# Patient Record
Sex: MALE | Race: BLACK OR AFRICAN AMERICAN | NOT HISPANIC OR LATINO | Employment: PART TIME | URBAN - METROPOLITAN AREA
[De-identification: names, ages, dates, MRNs, and addresses within clinical notes are randomized per-mention and may not be internally consistent; named-entity substitution may affect disease eponyms.]

---

## 2021-03-22 ENCOUNTER — HOSPITAL ENCOUNTER (OUTPATIENT)
Dept: BEHAVIORAL HEALTH | Facility: CLINIC | Age: 56
Discharge: HOME | End: 2021-03-22
Attending: FAMILY MEDICINE | Admitting: FAMILY MEDICINE
Payer: COMMERCIAL

## 2021-03-22 VITALS — HEIGHT: 72 IN | BODY MASS INDEX: 23.7 KG/M2 | WEIGHT: 175 LBS

## 2021-03-22 PROCEDURE — H0001 ALCOHOL AND/OR DRUG ASSESS: HCPCS | Mod: GT

## 2021-03-22 RX ORDER — FUROSEMIDE 80 MG
TABLET ORAL
COMMUNITY
Start: 2021-03-01 | End: 2023-03-16

## 2021-03-22 RX ORDER — ALLOPURINOL 100 MG/1
50 TABLET ORAL
COMMUNITY
Start: 2021-01-26 | End: 2023-03-16

## 2021-03-22 RX ORDER — CHOLECALCIFEROL (VITAMIN D3) 125 MCG
3000 CAPSULE ORAL
COMMUNITY
Start: 2021-01-26 | End: 2023-03-16

## 2021-03-22 RX ORDER — LANCETS
EACH MISCELLANEOUS
COMMUNITY
Start: 2021-01-26

## 2021-03-22 RX ORDER — BLOOD SUGAR DIAGNOSTIC
STRIP MISCELLANEOUS
COMMUNITY
Start: 2021-01-26

## 2021-03-22 RX ORDER — BUPROPION HYDROCHLORIDE 150 MG/1
TABLET ORAL
COMMUNITY
Start: 2020-05-02 | End: 2023-03-16

## 2021-03-22 RX ORDER — FUROSEMIDE 80 MG
160 TABLET ORAL
COMMUNITY
Start: 2021-03-01 | End: 2023-03-16

## 2021-03-22 RX ORDER — BLOOD-GLUCOSE METER
EACH MISCELLANEOUS
COMMUNITY
Start: 2021-01-26

## 2021-03-22 RX ORDER — ERGOCALCIFEROL 1.25 MG/1
50000 CAPSULE ORAL
COMMUNITY
Start: 2021-01-26 | End: 2023-03-16

## 2021-03-22 RX ORDER — BLOOD SUGAR DIAGNOSTIC
STRIP MISCELLANEOUS
COMMUNITY
Start: 2021-01-26 | End: 2023-08-08

## 2021-03-22 RX ORDER — LANCETS
EACH MISCELLANEOUS
COMMUNITY
Start: 2021-01-26 | End: 2023-08-08

## 2021-03-22 RX ORDER — GABAPENTIN 100 MG/1
100 CAPSULE ORAL
COMMUNITY
Start: 2021-01-25 | End: 2021-04-01

## 2021-03-22 RX ORDER — ACETAMINOPHEN 325 MG/1
650 TABLET ORAL
COMMUNITY
Start: 2021-01-14 | End: 2023-03-16

## 2021-03-22 RX ORDER — BLOOD-GLUCOSE METER
EACH MISCELLANEOUS
COMMUNITY
Start: 2021-01-26 | End: 2023-08-08

## 2021-03-22 RX ORDER — ALLOPURINOL 100 MG/1
TABLET ORAL
COMMUNITY
Start: 2021-01-25 | End: 2023-03-16

## 2021-03-22 RX ORDER — IBUPROFEN 200 MG
500 CAPSULE ORAL
COMMUNITY
Start: 2021-01-26 | End: 2023-03-16

## 2021-03-22 RX ORDER — GABAPENTIN 100 MG/1
100 CAPSULE ORAL 3 TIMES DAILY
Status: ON HOLD | COMMUNITY
Start: 2021-02-23 | End: 2023-03-18

## 2021-03-22 ASSESSMENT — ANXIETY QUESTIONNAIRES
GAD7 TOTAL SCORE: 10
3. WORRYING TOO MUCH ABOUT DIFFERENT THINGS: MORE THAN HALF THE DAYS
4. TROUBLE RELAXING: MORE THAN HALF THE DAYS
5. BEING SO RESTLESS THAT IT IS HARD TO SIT STILL: SEVERAL DAYS
IF YOU CHECKED OFF ANY PROBLEMS ON THIS QUESTIONNAIRE, HOW DIFFICULT HAVE THESE PROBLEMS MADE IT FOR YOU TO DO YOUR WORK, TAKE CARE OF THINGS AT HOME, OR GET ALONG WITH OTHER PEOPLE: SOMEWHAT DIFFICULT
6. BECOMING EASILY ANNOYED OR IRRITABLE: SEVERAL DAYS
1. FEELING NERVOUS, ANXIOUS, OR ON EDGE: MORE THAN HALF THE DAYS
7. FEELING AFRAID AS IF SOMETHING AWFUL MIGHT HAPPEN: SEVERAL DAYS
2. NOT BEING ABLE TO STOP OR CONTROL WORRYING: SEVERAL DAYS

## 2021-03-22 ASSESSMENT — COLUMBIA-SUICIDE SEVERITY RATING SCALE - C-SSRS
2. HAVE YOU ACTUALLY HAD ANY THOUGHTS OF KILLING YOURSELF?: NO
TOTAL  NUMBER OF ABORTED OR SELF INTERRUPTED ATTEMPTS PAST 3 MONTHS: NO
5. HAVE YOU STARTED TO WORK OUT OR WORKED OUT THE DETAILS OF HOW TO KILL YOURSELF? DO YOU INTEND TO CARRY OUT THIS PLAN?: NO
ATTEMPT LIFETIME: NO
1. IN THE PAST MONTH, HAVE YOU WISHED YOU WERE DEAD OR WISHED YOU COULD GO TO SLEEP AND NOT WAKE UP?: NO
ATTEMPT PAST THREE MONTHS: NO
TOTAL  NUMBER OF ABORTED OR SELF INTERRUPTED ATTEMPTS PAST LIFETIME: NO
1. IN THE PAST MONTH, HAVE YOU WISHED YOU WERE DEAD OR WISHED YOU COULD GO TO SLEEP AND NOT WAKE UP?: NO
4. HAVE YOU HAD THESE THOUGHTS AND HAD SOME INTENTION OF ACTING ON THEM?: NO
5. HAVE YOU STARTED TO WORK OUT OR WORKED OUT THE DETAILS OF HOW TO KILL YOURSELF? DO YOU INTEND TO CARRY OUT THIS PLAN?: NO
6. HAVE YOU EVER DONE ANYTHING, STARTED TO DO ANYTHING, OR PREPARED TO DO ANYTHING TO END YOUR LIFE?: NO
6. HAVE YOU EVER DONE ANYTHING, STARTED TO DO ANYTHING, OR PREPARED TO DO ANYTHING TO END YOUR LIFE?: NO
TOTAL  NUMBER OF INTERRUPTED ATTEMPTS LIFETIME: NO
3. HAVE YOU BEEN THINKING ABOUT HOW YOU MIGHT KILL YOURSELF?: NO
TOTAL  NUMBER OF INTERRUPTED ATTEMPTS PAST 3 MONTHS: NO
2. HAVE YOU ACTUALLY HAD ANY THOUGHTS OF KILLING YOURSELF LIFETIME?: NO
4. HAVE YOU HAD THESE THOUGHTS AND HAD SOME INTENTION OF ACTING ON THEM?: NO

## 2021-03-22 ASSESSMENT — PATIENT HEALTH QUESTIONNAIRE - PHQ9: SUM OF ALL RESPONSES TO PHQ QUESTIONS 1-9: 9

## 2021-03-22 ASSESSMENT — PAIN SCALES - GENERAL: PAINLEVEL: NO PAIN (0)

## 2021-03-22 ASSESSMENT — MIFFLIN-ST. JEOR: SCORE: 1671.79

## 2021-03-22 NOTE — PROGRESS NOTES
"[facility]  Provider Name:  Rashel Tamayo     Credentials:  [facility]    PATIENT'S NAME: Monty Cox  PREFERRED NAME: \"Monty\"  PRONOUNS:  He/Him    MRN: [id number]  : 1965  ADDRESS: [address] [address]   ACCT. NUMBER:  [id number]  DATE OF SERVICE: 3/22/21  START TIME: 1:00 PM  END TIME: 2:30 PM  PREFERRED PHONE: [phone]  May we leave a program related message: Yes  SERVICE MODALITY:  Video Visit:      Provider verified identity through the following two step process.  Patient provided:  Patient  and Patient's last 4 digits of SSN    Telemedicine Visit: The patient's condition can be safely assessed and treated via synchronous audio and visual telemedicine encounter.      Reason for Telemedicine Visit: Patient has requested telehealth visit    Originating Site (Patient Location): Patient's home    Distant Site (Provider Location): Provider Remote Setting    Consent:  The patient/guardian has verbally consented to: the potential risks and benefits of telemedicine (video visit) versus in person care; bill my insurance or make self-payment for services provided; and responsibility for payment of non-covered services.     Patient would like the video invitation sent by:  Send to e-mail at: [email or web address]    Mode of Communication:  Video Conference via well    As the provider I attest to compliance with applicable laws and regulations related to telemedicine.    UNIVERSAL ADULT Substance Use Disorder DIAGNOSTIC ASSESSMENT      Identifying Information:  Patient is a 55 year old, .  The pronoun use throughout this assessment reflects the patient's chosen pronoun.  Patient was referred for an assessment by self.  Patient attended the session alone.     Chief Complaint:   The reason for seeking services at this time is: \"I have been sober for a while and had relapsed and needed professional help to maintain " "sobriety\"   The problem(s) began about 15-20 years. Patient has attempted to resolve these concerns in the past through 1 IP and 1 OP ([facility] for IP and [facility] for OP).  Patient does not appear to be in severe withdrawal, an imminent safety risk to self or others, or requiring immediate medical attention and may proceed with the assessment interview.    Social/Family History:  Patient reported they grew up in [place].  They were raised by my mother primarily and father co-parenting.  Patient reported that their childhood was good.  Patient describes current relationships with family of origin as \"very good\".      The patient describes their cultural background as -Black.  Cultural influences and impact on patient's life structure, values, norms, and healthcare: none identified.  Contextual influences on patient's health include: Family Factors oriented person.  Patient identified their preferred language to be English. Patient reported they does not need the assistance of an  or other support involved in therapy.  Patient reports they are involved in community of jg activities.  They reports spirituality impacts recovery in the following ways: \"medium to high impact in a positive way\".     Patient reported had no significant delays in developmental tasks.   Patient's highest education level was some college. Patient identified the following learning problems: none reported.  Patient reports they are  able to understand written materials.    Patient reported the following relationship history \"Yes, we were together for 8 years before I  my wife\".  Patient's current relationship status is  for 10 years.   Patient identified their sexual orientation as heterosexual.  Patient reported having zero child(isidro).     Patient's current living/housing situation involves staying in own home/apartment.  They live with my wife and two firends and they report that housing is stable. " "Patient identified mother, father, friends and spouse as part of their support system.  Patient identified the quality of these relationships as good.      Patient reports engaging in the following recreational/leisure activities: \"painting arts; gardening\".  Patient is currently unemployed.  Patient reports their income is obtained through spouse and unemployment.  Patient does not identify finances as a current stressor.      Patient denies substance related arrests or legal issues.  Patient denies being on probation / parole / under the jurisdiction of the court.      Patient's Strengths and Limitations:  Patient identified the following strengths or resources that will help them succeed in treatment: Judaism / Sabianist, commitment to health and well being, jg / spirituality, friends / good social support, family support, motivation, sober support group / recovery support  and sponsor. Things that may interfere with the patient's success in treatment include: few friends and lack of social support.     Personal and Family Medical History:  Patient did not report a family history of mental health concerns.  Patient reports family history is not on file..      Patient reported the following previous mental health diagnoses: \"depression and anxiety\".  Patient reports their primary mental health symptoms include:\"tired, being reclusive\" and these do not impact his ability to function.   Patient has received mental health services in the past: \"Not very long about 3 months ago\".  Psychiatric Hospitalizations: None.  Patient denies a history of civil commitment.  Current mental health services/providers include:  None reported.    GAIN-SS Tool:    When was the last time that you had significant problems...  a. with feeling very trapped, lonely, sad, blue, depressed or hopeless about the future? Past Month  b. with sleep trouble, such as bad dreams, sleeping restlessly, or falling asleep during the day? Past " Month  c. with feeling very anxious, nervous, tense, scared, panicked or like something bad was going to happen?  Past Month  d. with becoming very distressed and upset when something reminded you of the past?  2 - 12 months ago  e. with thinking about ending your life or committing suicide?  Never  When was the last time that you did the following things two or more times?  a. Lied or conned to get things you wanted or to avoid having to do something?   Past Month  b. Had a hard time paying attention at school, work or home? Past Month  c. Had a hard time listening to instructions at school, work or home?  Never  d. Were a bully or threatened other people?  Never  e. Started physical fights with other people?  Never    Patient has had a physical exam to rule out medical causes for current symptoms.  Date of last physical exam was within the past year. Client was encouraged to follow up with PCP if symptoms were to develop. The patient has a non-[place] Primary Care Provider. Their PCP is Dr. Gustavo Chong at [facility]..  Patient reports the following current medical concerns: kidneys failure-is on dialysis and no current dental concerns.  Patient denies any issues with pain.. Patient denies pregnancy..  There are not significant appetite / nutritional concerns / weight changes.  Patient does not report a history of an eating disorder.  Patient does not report a history of head injury / trauma / cognitive impairment.  NA    Patient reports current meds as:   Outpatient Medications Marked as Taking for the 3/22/21 encounter (Hospital Encounter) with Rashel James LADC   Medication Sig     ACCU-CHEK GUIDE test strip      acetaminophen (TYLENOL) 325 MG tablet Take 650 mg by mouth     allopurinol (ZYLOPRIM) 100 MG tablet Take 50 mg by mouth     allopurinol (ZYLOPRIM) 100 MG tablet      blood glucose (ACCU-CHEK GUIDE) test strip Test 2 times per day. Pharmacy allowed to substitute per patient's insurance.     blood  glucose monitoring (ACCU-CHEK ERIKA PLUS) meter device kit Use as directed     blood glucose monitoring (SOFTCLIX) lancets Test 2 times per day. Call your doctor if blood glucose is > 300.     blood glucose monitoring (SOFTCLIX) lancets      Blood Glucose Monitoring Suppl (ACCU-CHEK GUIDE) w/Device KIT      buPROPion (WELLBUTRIN XL) 150 MG 24 hr tablet      calcium carbonate 500 mg, elemental, (OSCAL 500) 1250 (500 Ca) MG TABS tablet Take 500 mg by mouth     darbepoetin veronika (ARANESP) 40 MCG/ML injection Inject 40 mcg Subcutaneous     ergocalciferol (ERGOCALCIFEROL) 1.25 MG (68216 UT) capsule Take 50,000 Units by mouth     furosemide (LASIX) 80 MG tablet Take 160 mg by mouth     furosemide (LASIX) 80 MG tablet      gabapentin (NEURONTIN) 100 MG capsule Take 100 mg by mouth     gabapentin (NEURONTIN) 100 MG capsule      lactase (LACTAID) 3000 UNIT tablet Take 3,000 Units by mouth     multivitamin RENAL (NEPHROCAPS/TRIPHROCAPS) 1 MG capsule Take 1 mg by mouth       Medication Adherence:  Patient reports taking prescribed medications as prescribed.    Patient Allergies:  Not on File    Medical History:  No past medical history on file.    Rating Scales:    PHQ9:  No flowsheet data found.;      GAD7:  No flowsheet data found.    Substance Use:  Patient reported the following biological family members or relatives with chemical health issues: Father reportedly used alcohol , Nephew reportedly used alcohol .  Patient has received substance use disorder and/or gambling treatment in the past.  Patient reports the following dates and locations of treatment services:  1 IP and 1 OP ([facility] for IP and [facility] for OP).  Patient has ever been to detox 3 times.  Patient is not currently receiving any chemical dependency treatment. Patient reports they currently attend the following support groups: AA every Tuesday.             X = Primary Drug Used   Age of First Use Most Recent Pattern of Use and Duration   Need enough  "information to show pattern (both frequency and amounts) and to show tolerance for each chemical that has a diagnosis   Date of last use and time, if needed   Withdrawal Potential? Requiring special care Method of use  (oral, smoked, snort, IV, etc)      Alcohol     20 Drinking 4-5 drinks with friends possibly on weekends and maybe once or twice during the week.  I had 6-7 years of sobriety.  My drinking has gone out of hand for the last 15-20 years( drinking 4-8 drinks about 4 nights a week.  Past 12 months: Prior to couple months ago. It was 1-2 pints daily, and then reduced to few drinks for the past couple weeks.   A week and half no oral      Marijuana/  Hashish   No use          Cocaine/Crack     No use          Meth/  Amphetamines   No use          Heroin     No use          Other Opiates/  Synthetics   No use          Inhalants     No use          Benzodiazepines     No use          Hallucinogens     No use          Barbiturates/  Sedatives/  Hypnotics No use          Over-the-Counter Drugs   No use          Other     No use          Nicotine     No use           Patient reported the following problems as a result of their substance use: relationship problems.  Patient is concerned about substance use. Patient reports alcohol is concerned about their substance use.  Patient reports their recovery goals are \"stopping drinking altogether and not relying on it'.     Patient reports experiencing the following withdrawal symptoms within the past 12 months: sweating, shaky/jittery/tremors, unable to sleep, agitation, headache, fatigue, sad/depressed feeling, muscle aches, vivid/unpleasant dreams, irritability, nausea/vomiting, dizziness, seizures, diarrhea, diminished appetite, hallucinations, unable to eat, fever and anxiety/worry and the following within the past 30 days: none.   Patients reports urges to use Alcohol.  Patient reports he has used more Alcohol than intended and over a longer period of time than " "intended. Patient reports he has had unsuccessful attempts to cut down or control use of Alcohol.  Patient reports longest period of abstinence was 6-7 years and return to use was due to \"stress and anxiety; family health related issues\". Patient reports he has needed to use more Alcohol to achieve the same effect.  Patient does  report diminished effect with use of same amount of Alcohol.     Patient does  report a great deal of time is spent in activities necessary to obtain, use, or recover from Alcohol effects.  Patient does not report important social, occupational, or recreational activities are given up or reduced because of Alcohol use.  Alcohol use is continued despite knowledge of having a persistent or recurrent physical or psychological problem that is likely to have caused or exacerbated by use.  Patient reports the following problem behaviors while under the influence of substances \"more isolated, talk more than usual\".     Patient reports substance use has not ever impacted their ability to function in a school setting. Patient reports substance use has not ever impacted their ability to function in a work setting.  Patients demographics and history impact their recovery in the following ways:  None reported.  Patient reports engaging in the following recreation/leisure activities while using:  None at this time.  Patient reports the following people are supportive of recovery: Family and friends.    Patient does not have a history of gambling concerns and/or treatment.  Patient does not have other addictive behaviors he is concerned about.        Dimension Scale Ratings:    Dimension 1 -  Acute Intoxication/Withdrawal: 1 - Minor Problem  Dimension 2 - Biomedical: 2 - Moderate Problem  Dimension 3 - Emotional/Behavioral/Cognitive Conditions: 2 - Moderate Problem  Dimension 4 - Readiness to Change:  2 - Moderate Problem  Dimension 5 - Relapse/Continued Use/ Continued Problem Potential: 3 - Severe " Problem  Dimension 6 - Recovery Environment:  3 - Severe Problem      Significant Losses / Trauma / Abuse / Neglect Issues:   Patient did not serve in the .  There are indications or report of significant loss, trauma, abuse or neglect issues related to: are no indications and client denies any losses, trauma, abuse, or neglect concerns.  Concerns for possible neglect are not present. NA    Safety Assessment:   Current Safety Concerns:  [place] Suicide Severity Rating Scale (Lifetime/Recent)  [place] Suicide Severity Rating (Lifetime/Recent) 3/22/2021   1. Wish to be Dead (Lifetime) No   1. Wish to be Dead (Recent) No   2. Non-Specific Active Suicidal Thoughts (Lifetime) No   2. Non-Specific Active Suicidal Thoughts (Recent) No   3. Active Suicidal Ideation with any Methods (Not Plan) Without Intent to Act (Lifetime) No   3. Active Suicidal Ideation with any Methods (Not Plan) Without Intent to Act (Recent) No   4. Active Suicidal Ideation with Some Intent to Act, Without Specific Plan (Lifetime) No   4. Active Suicidal Ideation with Some Intent to Act, Without Specific Plan (Recent) No   5. Active Suicidal Ideation with Specific Plan and Intent (Lifetime) No   5. Active Suicidal Ideation with Specific Plan and Intent (Recent) No   Most Severe Ideation Rating (Lifetime) NA   Most Severe Ideation Description (Lifetime) NA   Frequency (Lifetime) NA   Duration (Lifetime) NA   Controllability (Lifetime) NA   Protective Factors  (Lifetime) NA   Reasons for Ideation (Lifetime) NA   Most Severe Ideation Rating (Past Month) NA   Most Severe Ideation Description (Past Month) NA   Frequency (Past Month) NA   Duration (Past Month) NA   Controllability (Past Month) NA   Protective Factors (Past Month) NA   Reasons for Ideation (Past Month) NA   Actual Attempt (Lifetime) No   Actual Attempt (Past 3 Months) No   Has subject engaged in non-suicidal self-injurious behavior? (Lifetime) No   Has subject engaged in  non-suicidal self-injurious behavior? (Past 3 Months) No   Interrupted Attempts (Lifetime) No   Interrupted Attempts (Past 3 Months) No   Aborted or Self-Interrupted Attempt (Lifetime) No   Aborted or Self-Interrupted Attempt (Past 3 Months) No   Preparatory Acts or Behavior (Lifetime) No   Preparatory Acts or Behavior (Past 3 Months) No   Most Recent Attempt Actual Lethality Code NA   Most Lethal Attempt Actual Lethality Code NA   Initial/First Attempt Actual Lethality Code NA     Patient denies current homicidal ideation and behaviors.  Patient denies current self-injurious ideation and behaviors.    Patient denied risk behaviors associated with substance use.  Patient denies any high risk behaviors associated with mental health symptoms.  Patient reports the following current concerns for their personal safety: None.  Patient reports there are not  firearms in the house. NA.     History of Safety Concerns:  Patient denied a history of homicidal ideation.     Patient denied a history of personal safety concerns.    Patient denied a history of assaultive behaviors.    Patient denied a history of sexual assault behaviors.     Patient denied a history of risk behaviors associated with substance use.  Patient denies any history of high risk behaviors associated with mental health symptoms.  Patient reports the following protective factors: spirituality, restricted access to lethal means weapon, safe and stable environment, help seeking behaviors when distressed for his alcohol use problem, adherence with prescribed medication, agreement to use safety plan, living with other people, uses community crisis resources, healthy fear of risky behaviors or pain and access to a variety of clinical interventions    Risk Plan:  See Recommendations for Safety and Risk Management Plan    Review of Symptoms per patient report:  Substance Use:  vomiting, hangovers, daily use, family relationship problems due to substance use and  cravings/urges to use     Diagnostic Criteria:  OP BEH ALEXA CRITERIA: Substance is often taken in larger amounts or over a longer period than was intended.  Met for:  Alcohol, There is persistent desire or unsuccessful efforts to cut down or control use of the substance.  Met for:  Alcohol,  A great deal of time is spent in activities necessary to obtain the substance, use the substance, or recover from its effects.  Met for:  Alcohol, Craving, or a strong desire or urge to use the substance.  Met for:  Alcohol, Continued use of the substance despite having persistent or recurrent social or interpersonal problems caused or exacerbated by the effects of its use.  Met for:  Alcohol, Important social, occupational, or recreational activities are given up or reduced because of the substance.  Met for:  Alcohol, Use of the substance is continued despite knowledge of having a persistent or recurrent physical or psychological problem that is likely to have been cause or exacerbated by the substance.  Met for:  Alcohol, Tolerance:  either a need for markedly increased amounts of the substance to achieve the desired effect or a markedly diminished effect with continued use of the dame amount of the substance.  Met for:  Alcohol, Withdrawal:  either patient endorses characteristic withdrawal syndrome for the substance or the substance (or closely related substance) is taken to relieve or avoid withdrawal symptoms.  Met for:  Alcohol          Collateral Contact Summary:   Collateral contacts contributing to this assessment:  NA    If court related records were reviewed, summarize here: NA    Information from collateral contacts supported/largely agreed with information from the client and associated risk ratings.    Information in this assessment was obtained from the medical record and provided by patient who is a fair historian.    Patient will have open access to their mental health medical record.    Diagnostic Criteria:  1.) Alcohol/drug is often taken in larger amounts or over a longer period than was intended.  Met for Alcohol.  2.) There is a persistent desire or unsuccessful efforts to cut down or control alcohol/drug use.  Met for Alcohol.  3.) A great deal of time is spent in activities necessary to obtain alcohol, use alcohol, or recover from its effects.  Met for Alcohol.  4.) Craving, or a strong desire or urge to use alcohol/drug.  Met for Alcohol.  6.) Continued alcohol use despite having persistent or recurrent social or interpersonal problems caused or exacerbated by the effects of alcohol/drug.  Met for Alcohol.  7.) Important social, occupational, or recreational activities are given up or reduced because of alcohol/drug use.  Met for Alcohol.  9.) Alcohol/drug use is continued despite knowledge of having a persistent or recurrent physical or psychological problem that is likely to have been caused or exacerbated by alcohol.  Met for Alcohol.  10.) Tolerance, as defined by either of the following: A need for markedly increased amounts of alcohol/drug to achieve intoxication or desired effect..  Met for Alcohol.  11.) Withdrawal, as manifested by either of the following: The characteristic withdrawal syndrome for alcohol/drug (refer to Criteria A and B of the criteria set for alcohol/drug withdrawal).. Met for Alcohol.       As evidenced by self report and criteria, client meets the following DSM5 Diagnoses:   (Sustained by DSM5 Criteria Listed Above)  Alcohol Use Disorder   303.90 (F10.20) Severe Sustained.    Recommendations:     1. Plan for Safety and Risk Management:  Recommended that patient call 911 or go to the local ED should there be a change in any of these risk factors..      Report to child / adult protection services was NA.     2. ALEXA Referrals: Recommendations:  Complete an Int[facility]  .  Patient reports they are willing to follow these  recommendations.  Patient would like the following family or other support people involved in their treatment:  None at this time. Patient does not have a history of opiate use.    3. [facility]: Record has been saved! Assessment ID: [id number]     4. Recommendations for treatment focus:   Alcohol / Substance Use - Alcohol.                      Provider Name/ Credentials:  Rashel James MA, [facility]  Substance Use Assessment  Phone: [phone]  Fax: [phone]    March 22, 2021

## 2021-03-23 ASSESSMENT — ANXIETY QUESTIONNAIRES: GAD7 TOTAL SCORE: 10

## 2021-03-31 ENCOUNTER — HOSPITAL ENCOUNTER (OUTPATIENT)
Dept: BEHAVIORAL HEALTH | Facility: CLINIC | Age: 56
End: 2021-03-31
Attending: FAMILY MEDICINE
Payer: COMMERCIAL

## 2021-03-31 PROCEDURE — H2035 A/D TX PROGRAM, PER HOUR: HCPCS | Mod: GT

## 2021-03-31 NOTE — PROGRESS NOTES
Client:  Monty Cox  MRN: [id number]      Comprehensive Assessment UPDATE/IS/RASHAAD/Jennifer Re-Assess   Comprehensive Assessment Update: 3/31/2021    Comprehensive assessment dated 3/22/21 was reviewed and updates are as follows: None  Reason for admission today:  [facility]    Dates of last use and substance(s) used:   Patient does not have a history of opiate use.    Safety concerns:         Other:  None    Health Screening:  Given patient's past history, a medication, and physical condition, is there a fall risk?  No  Does the patient have any pain? No  Is the patient on a special diet? If yes, please explain: yes dialysis  Does the patient have any concerns regarding your nutritional status? If yes, please explain: no  Has the patient had any appetite changes in the last 3 months?  Yes, more/greator  Has the patient had any weight loss or weight gain in the last 3 months? Yes, how much? Loss 15 lbs  Has the patient have a history of an eating disorder or been over-eating, avoiding meals, or inducing vomiting?  No  Does the patient have any dental concerns? (Problems with teeth, pain, cavities, braces)?  NO  Are immunizations up to date?  Yes  Any recent exposure to TB, Hepatitis, Measles, or Strep?  No  Client's BMI is 23.9  Client informed of BMI?  yes  Normal, No Intervention    Dimension Scale Ratings:    Dimension 1: 1 Client can tolerate and cope with withdrawal discomfort. The client displays mild to moderate intoxication or signs and symptoms interfering with daily functioning but does not immediately endanger self or others. Client poses minimal risk of severe withdrawal.    Dimension 2: 2 Client has difficulty tolerating and coping with physical problems or has other biomedical problems that interfere with recovery and treatment. Client neglects or does not seek care for serious biomedical problems.    Dimension 3: 2 Client has difficulty with impulse control and lacks coping skills. Client has  thoughts of suicide or harm to others without means; however, the thoughts may interfere with participation in some treatment activities. Client has difficulty functioning in significant life areas. Client has moderate symptoms of emotional, behavioral, or cognitive problems. Client is able to participate in most treatment activities.    Dimension 4: 2 Client displays verbal compliance, but lacks consistent behaviors; has low motivation for change; and is passively involved in treatment.    Dimension 5: 3 Client has poor recognition and understanding of relapse and recidivism issues and displays moderately high vulnerability for further substance use or mental health problems. Client has few coping skills and rarely applies coping skills.    Dimension 6: 3 Client is not engaged in structured, meaningful activity and the client's peers, family, significant other, and living environment are unsupportive, or there is significant criminal justice system involvement.    Initial Service Plan (ISP)    Immediate health, safety, and preliminary service needs identified and plan includes the following based on available information from clients, referral sources, and collateral information.    Safety (SI, SIB, suicide attempts, aggressive behaviors):  No  Recommended that patient call 911 or go to the local ED should there be a change in any of these risk factors.     Health:  Client does NOT have health issues that would impede participation in treatment    Transportation: Client will be transported to treatment by self/video  .     Other:  NA    Patient does not have any identified barriers to participating in referred services.    Vulnerable Adult Assessment    Does the patient possess a physical or mental infirmity or other physical, mental, or emotional dysfunction?  No. Patient is not a vulnerable adult.    This patient is not a functional Vulnerable Adult according to [place] Statute 626.5572 subdivision 21.       Treatment suggestions for client for the time period until the    initial treatment planning session:  Participate and acclimate to group and peers.    [place] Re-Assessment:     Have you ever wished you were dead or that you could go to sleep and not wake up? Lifetime?  No   Past Month?  No     Have you actually had any thoughts of killing yourself?  Lifetime?  No   Past Month?  No     Have you been thinking about how you might do this? Lifetime?  N/A   Past Month?  N/A     Have you had these thoughts and had some intention of acting on them?  Lifetime?  N/A   Past Month?  N/A     Have you started to work out the details of how to kill yourself?  Lifetime?  N/A   Past Month?  N/A     Do you intend to carry out this plan?   N/A     When you have the thoughts how long do they last?   N/A    Are there things - anyone or anything (ie Family, Confucianist, pain of death) that stopped you from wanting to die or acting on thoughts of suicide?   Does not apply        2008  [employer]  Used with permission by Anay Ng, PhD.      Hermelindo Mann [facility]       3/31/2021     12:00 PM

## 2021-04-01 ENCOUNTER — HOSPITAL ENCOUNTER (OUTPATIENT)
Dept: BEHAVIORAL HEALTH | Facility: CLINIC | Age: 56
End: 2021-04-01
Attending: FAMILY MEDICINE
Payer: COMMERCIAL

## 2021-04-01 PROCEDURE — H2035 A/D TX PROGRAM, PER HOUR: HCPCS | Mod: HQ,95

## 2021-04-01 NOTE — GROUP NOTE
Group Therapy Documentation    PATIENT'S NAME: Monty Cox  MRN:   [id number]  :   1965  ACCT. NUMBER: [id number]  DATE OF SERVICE: 21  START TIME:  5:30 PM  END TIME:  7:30 PM  FACILITATOR(S): Hermelindo Mann LADC  TOPIC: BEH Group Therapy  Number of patients attending the group:  10  Group Length:  2 Hours    Group Therapy Type: Addiction    Summary of Group / Topics Discussed:    Of Addiction [place] Neurobiology     Telemedicine Visit: The patient's condition can be safely assessed and treated via synchronous audio and visual telemedicine encounter.      Reason for Telemedicine Visit: Services only offered telehealth    Originating Site (Patient Location): Patient's home    Distant Site (Provider Location): Provider Remote Setting    Consent:  The patient/guardian has verbally consented to: the potential risks and benefits of telemedicine (video visit) versus in person care; bill my insurance or make self-payment for services provided; and responsibility for payment of non-covered services.     Mode of Communication:  Video Conference via [employer]    As the provider I attest to compliance with applicable laws and regulations related to telemedicine.       Group Attendance:  Attended group session    Patient's response to the group topic/interactions:  cooperative with task    Patient appeared to be Engaged.        Client specific details:  Clt said his self talk was keeping him busy at home this week. he shared his patience and tolerance this week was at 9.5 /10. He has plans to exercise, work on his diet, practice meditation and mindfulness skills this weekend.Client finished Pleasure Un[employer] video and Identified with addiction being a disease of  the ability to choose .Client identified the roles of  2 neurotransmitters Glutamate and Dopamine in addiction. Client finished group with a discussion of Forgiveness and Grief and the roles they can play in addiction. Client identified as  recovery actions they will take over the weekend to support their recovery.

## 2021-04-01 NOTE — PROGRESS NOTES
Service Initiation Session  Service Initiation Individua session.  Video visit     Duration 60 minutes     Data: Client and I discussed SI and Orientation materials.  Client also expressed that he has been trying to control his drinking without success and recently had an extended hospital stay for detox and related health problems to his drinking..     Intervention: motivational interviewing, person centered approach      Assessment: Client seems to feel hopeful for recovery      Plan: Client will do tx planning session week of 4/5     Client will start group today.  He is going to write down 3 goals he has for self growth and learning about addiction.     Telemedicine Visit: The patient's condition can be safely assessed and treated via synchronous audio and visual telemedicine encounter.       Reason for Telemedicine Visit: Services only offered telehealth     Originating Site (Patient Location): Patient's home     Distant Site (Provider Location): Provider Remote Setting     Consent:  The patient/guardian has verbally consented to: the potential risks and benefits of telemedicine (video visit) versus in person care; bill my insurance or make self-payment for services provided; and responsibility for payment of non-covered services.      Mode of Communication:  Video Conference via [employer]     As the provider I attest to compliance with applicable laws and regulations related to telemedicine.

## 2021-04-01 NOTE — ADDENDUM NOTE
Encounter addended by: Hermelindo Mann LADC on: 4/1/2021 1:07 PM   Actions taken: Clinical Note Signed

## 2021-04-04 NOTE — ADDENDUM NOTE
Encounter addended by: Kayce Dahl [facility] on: 4/4/2021 2:09 AM   Actions taken: Episode edited

## 2021-04-05 ENCOUNTER — HOSPITAL ENCOUNTER (OUTPATIENT)
Dept: BEHAVIORAL HEALTH | Facility: CLINIC | Age: 56
End: 2021-04-05
Attending: FAMILY MEDICINE
Payer: COMMERCIAL

## 2021-04-05 PROCEDURE — H2035 A/D TX PROGRAM, PER HOUR: HCPCS | Mod: HQ,GT

## 2021-04-05 NOTE — GROUP NOTE
Group Therapy Documentation    PATIENT'S NAME: Monty Cox  MRN:   [id number]  :   1965  ACCT. NUMBER: [id number]  DATE OF SERVICE: 21  START TIME:  5:30 PM  END TIME:  7:30 PM  FACILITATOR(S): Hermelindo Mann LADC  TOPIC: BEH Group Therapy  Number of patients attending the group:  10  Group Length:  2 Hours    Group Therapy Type: Addiction    Summary of Group / Topics Discussed:    Core Values Exploration    Telemedicine Visit: The patient's condition can be safely assessed and treated via synchronous audio and visual telemedicine encounter.      Reason for Telemedicine Visit: Services only offered telehealth    Originating Site (Patient Location): Patient's home    Distant Site (Provider Location): Provider Remote Setting    Consent:  The patient/guardian has verbally consented to: the potential risks and benefits of telemedicine (video visit) versus in person care; bill my insurance or make self-payment for services provided; and responsibility for payment of non-covered services.     Mode of Communication:  Video Conference via [employer]    As the provider I attest to compliance with applicable laws and regulations related to telemedicine.        Group Attendance:  Attended group session    Patient's response to the group topic/interactions:  cooperative with task    Patient appeared to be Attentive.        Client specific details: Clt reported anxiety levels of 8/10 and depresion ov 5/10. He shared he organized a b-day party for his wife and shared that he asked a friend for help and they talked about how hard it can be to ask for help. Client participated in group discussion and exploration of personal values and how this relates to developing a knowledge of their individual strengths. Client engaged in group discussions of Strength exploration and identification. Client used knowledge of core values to assist them in a group discussion regarding establishing individual healthy  boundaries..   10

## 2021-04-06 ENCOUNTER — HOSPITAL ENCOUNTER (OUTPATIENT)
Dept: BEHAVIORAL HEALTH | Facility: CLINIC | Age: 56
End: 2021-04-06
Attending: FAMILY MEDICINE
Payer: COMMERCIAL

## 2021-04-06 PROCEDURE — H2035 A/D TX PROGRAM, PER HOUR: HCPCS | Mod: HQ,95 | Performed by: SOCIAL WORKER

## 2021-04-07 ENCOUNTER — HOSPITAL ENCOUNTER (OUTPATIENT)
Dept: BEHAVIORAL HEALTH | Facility: CLINIC | Age: 56
End: 2021-04-07
Attending: FAMILY MEDICINE
Payer: COMMERCIAL

## 2021-04-07 ENCOUNTER — BEH TREATMENT PLAN (OUTPATIENT)
Dept: BEHAVIORAL HEALTH | Facility: CLINIC | Age: 56
End: 2021-04-07

## 2021-04-07 PROCEDURE — H2035 A/D TX PROGRAM, PER HOUR: HCPCS | Mod: HQ,GT

## 2021-04-07 NOTE — TREATMENT PLAN
[facility]  Adult Chemical Dependency Program  Treatment Plan Requirements    These services are provided by the facility for each patient/client according to the individual's treatment plan:    Individual and group counseling    Education    Transition services    Services to address any co-occurring mental illness    Service coordination    Initial Treatment Plan Goals:  1. Complete all the requirements of Program Orientation.  2. Maintain medication compliance throughout the program.  3. Complete requirements for workshop/skills groups based on identified issues on your problem list.  4. Complete the support group attendance feedback sheet weekly.  5. Gain family involvement in treatment process to address family issues from the problem list.  6. Attend and participate in all required groups per individual treatment plan.  7. Focus attention to individualized issues from the treatment plan.  8. Complete all requirements for UA's, alcohol screening tests and other testing.  9. Schedule a physical examination if recommended.    In addition to the above, complete all individual goals as specifically outlines on your treatment plan.    Criteria for discharge:  Patients/clients are discharged from the program following completion of the entire program including Phase I and II or acceptance of other post-treatment referrals such as [facility], or aftercare at other facilities.  Patients/clients may also be discharged for inappropriate behavior or chemical use.      Favorable Discharge - Patients/clients have completed agreed upon treatment goals, understand their diagnosis and appear motivated about the follow-up care.    Guarded Discharge - Patients/clients have demonstrated some understanding of their diagnosis and recovery process, and have completed some of their treatment goals.  This prognosis also includes patients/clients who have completed some treatment goals but have not made  commitment to community support or follow through with referrals.    Unfavorable Discharge - Patients/clients have not completed agreed upon treatment goals due to their own choice, have limited understanding of their diagnosis, and have shown minimal or inconsistent behavior conducive to recovery.  Those patients/clients discharged due to behavioral problems will also be unfavorable discharges.                                  Adult CD Treatment Plan     Monty Cox [id number]   4/7/2021 55 year old male        ------------------------------------------------------------------------------------------------------------------  Acute Intoxication/Withdrawal Potential     DIMENSION 1  RISK FACTOR: 0      SUBSTANCE USE DISORDERS:  Alcohol Use Disorder Severe (F10.20)             Assignment Date Source Prob/Goal/  Intervention Target  Date Initials Outcome Completion  Date   4/7/2021 Self -  Current History -  Current Assessment -  Current  Area of Treatment Focus: Substance use, cravings and urges.  Last use date was reported as 8/8/21.       Goal:   Develop effective strategies to maintain sobriety.      Treatment Strategies:  (Note: Must indicate the amount and frequency for each strategy)    Report to counselor and group any alcohol or drug use.                                             Ongoing through 8/19/21                                     JK                                       Effective                                     10/5/21         Biomedical Conditions and Complaints       DIMENSION 2  RISK FACTOR: 1               Assignment Date Source Prob/Goal/  Intervention Target  Date Initials Outcome Completion  Date   4/7/2021 Self -  Current History -  Current Assessment -  Current  Area of Treatment Focus: Client/Patient has a medical diagnosis of Kidney Disease.    Goal:     Disclose CD status to medical providers and follow up with medical interventions while in IOP.   Maintain good physical  health.    Treatment Strategies:  (Note: Must indicate the amount and frequency for each strategy)    Report change in severity of symptoms to staff.     Continue to take prescribed medications and follow-up with medical interventions while in program.    Exercise at least 30 minutes/3 times per week.                                               Ongoing through 8/19/21    Ongoing through 8/19/21          Ongoing through 8/19/21                                               NIMISHA BANSAL                                               Effective        Effective              Effective                                               10/5/21        10/5/21              10/5/21       -----------------------------------------------------------------------------------------------------------------    Emotional/Behavioral/Cognitive Conditions and Complications       DIMENSION 3  RISK FACTOR: 1               Assignment Date Source Prob/Goal/  Intervention Target  Date Initials Outcome Completion  Date   4/7/2021 Self -  Current History -  Current Assessment -  Current   Area of Treatment Focus:  Client lacks understanding of addiction as a disease and how this disorder affects other aspects of mental and physical health.     Goal:   Learn how to apply self-care and psychotherapy to build a repertoire of daily habits that counteract PAWS, fatigue, depression and anxiety leading to increased resiliency and well-being.      Treatment Strategies:  Attend each of the following groups one time a week and complete all 6 by due date, unless excused by primary counselor.  All group work to be completed each session to receive an effective outcome.      > Neurobiology of addiction: Informs client of brain changes and reduces feelings of shame, instructs on steps to help heal     > Learn and use Mindfulness to facilitate effective action in problem solving and promote health and well-being      > Mental Health Part 1: Learn  3 core processes of Acceptance,   Cognitive De-fusion, and Being Present     > Mental Health Part 2: Learn 3 core processes of Self-as-Context, Values, and Committed Action      > Learn and apply Self-Care in a variety of areas to improve mental and physical health, reduce PAWS, and increase feelings of self-empowerment, resiliency and well-being     > Learn Stress Management techniques to reduce PAWS and stress-related symptoms, increase resiliency, and learn healthy routines to replace dysfunctional habits       5/31/21 SK and NIMISHA MARVIN                    SK                                                                                             Effective              Effective              Effective              Effective               Effective                    Effective                                                                        05/11/21 04/06/21 04/13/21 04/20/21 04/27/21 05/04/21     -------------------------------------------------------------------------------------------------------------------     Readiness to Change       DIMENSION 4  RISK FACTOR: 1               Assignment Date Source Prob/Goal/  Intervention Target  Date Initials Outcome Completion  Date   4/7/2021 Self -  Current History -  Current Assessment -  Current  Area of Treatment Focus: {Client/Patient has consequences to self and others due to.   and Client/Patient lacks internal motivation to stop using substances.      Goal:      Understand the impact your substance use has had on you, your family, and significant relationships.    Increase internal motivation to change.    Treatment Strategies:  (Note: Must indicate the amount and frequency for each strategy)     Present 10 [place] assignment.  Once.    Each week write  down 3 reasons you want to remain sober.     Complete Symptoms assignment. Once.                                                       4/22/21        Ongoingthrough 8/19/21 5/7/21                                                           NIMISHA BANSAL                                                       Effective        Effective        Effective                                                       10/5/21        10/5/21        10/5/21     -------------------------------------------------------------------------------------------------------------------     Relapse/Continues Use/Continues Problem Potential       DIMENSION 5  RISK FACTOR: 1                   Assignment Date Source Prob/Goal/  Intervention Target  Date Initials Outcome Completion  Date   4/7/2021 Self -  Current History -  Current Assessment -  Current  Area of Treatment Focus: Lacks a daily routine that includes healthy and sober living skills.       Goal:     Identify personal triggers and relapse warning signs.      Develop sober coping and living skills in order to address the development of a strategy for long term recovery.    Treatment Strategies:  (Note: Must indicate the amount and frequency for each strategy)     Develop a relapse prevention plan including lifestyle changes, recovery activities, daily structure, self-care, support systems, spirituality, work, finances, recreation and crisis management.  and Complete the triggers and cravings assignment and include alternative behaviors.Once.    Complete the relapse autopsy assignment assignment and include coping skills.Once.                                                     Ongoing through 8/19/21 5/21/21 8/18/21                                                     NIMISHA BANSAL                                                       Effective                      Effective            Effective      "                                                    10/5/21                      5/21/21            8/17/21         Recovery Environment       DIMENSION 6  RISK FACTOR: 1                   AssignmentDate Source Prob/Goal/  Intervention Target  Date Initials Outcome Completion  Date   4/7/2021 Self -  Current History -  Current Assessment -  Current  Area of Treatment Focus: Lacks sober support network.       Goal:   Increase sober support network. Identify and attend sober support group meetings.  Treatment Strategies:  (Note: Must indicate the amount and frequency for each strategy)     Complete the personal \"Recovery Care Assignment\"Once.                                   8/12/21                                   JK                                   Effective                                   10/5/21       Individual abuse prevention plan (required for lodging plus) : specific actions, referral:   No additional protection measures required other than the Program Abuse Prevention Plan -The program's individual abuse prevention plan (IAPP) is sufficient for this client.      All interventions that are designated as current will need to be completed in order to transition out of treatment with a favorable prognosis. The treatment plan is a flexible document and a work in progress. Interventions and goals may be added at any time to customize this plan to each individual's needs. Client may work with clinician to change interventions as long as they pertain to the goals stipulated in the plan and/or are clinically driven.    Hermelindo Mann, [facility]    Acknowledgement of Current Treatment Plan - Initial Treatment Plan     INITIAL TREATMENT PLAN:     1. I have participated in creating my treatment plan with my therapist / counselor on 4/7/2021     I agree with the plan as it is written in the electronic health record.    Name Signature/Date   Monty Cox    Name of Therapist / Counselor Signature/Date   Hermelindo Mann, " ELIGIO      2. I have completed and reviewed my Safety Plan with my counselor and signed this on 4/7/21. I have been given the email copy of this plan.    Patient signature/date:      ___________________________________________________________________4/7/2021    3. Last Use Date: Client reported: 8/8/21    Patient signature/date:     ____________________________________________________________________4/7/2021

## 2021-04-07 NOTE — PROGRESS NOTES
[facility] Weekly Treatment Plan Review      ATTENDANCE for the following date span:  Monday, 21 thru , 21.    Day     Group Therapy 2.0 hours 3 hours 2.0 hours 2.0 hours   Individual Therapy       Family Therapy       Other (Specify)           Patient did not have any absences during this time period (list absence dates and reason for absence).        Weekly Treatment Plan Review     Treatment Plan initiated on: 21.    Dimension1: Acute Intoxication/Withdrawal Potential -   Previous Dimension Ratin  Current Dimension Ratin  Date of Last Use 21  Any reports of withdrawal symptoms - No        Dimension 2: Biomedical Conditions & Complications -   Previous Dimension Ratin  Current Dimension Ratin  Medical Concerns:    Current Medications & Medication Changes:  Current Outpatient Medications   Medication     ACCU-CHEK GUIDE test strip     acetaminophen (TYLENOL) 325 MG tablet     allopurinol (ZYLOPRIM) 100 MG tablet     allopurinol (ZYLOPRIM) 100 MG tablet     blood glucose (ACCU-CHEK GUIDE) test strip     blood glucose monitoring (ACCU-CHEK ERIKA PLUS) meter device kit     blood glucose monitoring (SOFTCLIX) lancets     blood glucose monitoring (SOFTCLIX) lancets     Blood Glucose Monitoring Suppl (ACCU-CHEK GUIDE) w/Device KIT     buPROPion (WELLBUTRIN XL) 150 MG 24 hr tablet     calcium carbonate 500 mg, elemental, (OSCAL 500) 1250 (500 Ca) MG TABS tablet     darbepoetin veronika (ARANESP) 40 MCG/ML injection     ergocalciferol (ERGOCALCIFEROL) 1.25 MG (30350 UT) capsule     furosemide (LASIX) 80 MG tablet     furosemide (LASIX) 80 MG tablet     gabapentin (NEURONTIN) 100 MG capsule     gabapentin (NEURONTIN) 100 MG capsule     lactase (LACTAID) 3000 UNIT tablet     multivitamin RENAL (NEPHROCAPS/TRIPHROCAPS) 1 MG capsule     No current facility-administered medications for this encounter.      Medication Prescriber:  [facility] [place]  Taking meds  "as prescribed? Yes  Medication side effects or concerns:  NA  Outside medical appointments this week (list provider and reason for visit):  None        Dimension 3: Emotional/Behavioral Conditions & Complications -   Previous Dimension Ratin  Current Dimension Ratin  PHQ9     PHQ-9 SCORE 3/22/2021   PHQ-9 Total Score 9     GAD7     PILI-7 SCORE 3/22/2021   Total Score 10     Mental health diagnosis Patient reported the following previous mental health diagnoses: \"depression and anxiety\".    Date of last SIB:  NA  Date of  last SI:  NA  Date of last HI: NA  Behavioral Targets:  increase impulse control and lacks coping skills.   Current MH Assignments:  Meditation and Journaling    Narrative:     Difficulty with impulse control and lacks coping skills. Thoughts of suicide or harm to others without plan or means; however, the thoughts may interfere with participation in some Tx activities. Difficulty functioning in significant life areas. Moderate symptoms of emotional, behavioral, or cognitive problems. Able to participate in most Tx activities.           Dimension 4: Treatment Acceptance / Resistance -   Previous Dimension Ratin  Current Dimension Ratin  ALEXA Diagnosis:  Alcohol Use Disorder   303.90 (F10.20) Severe   Stage - 2  Commitment to tx process/Stage of change- Prep  ALEXA assignments - 10 Worst Consequences    Narrative -    Displays verbal compliance, but lacks consistent behaviors; has low motivation for change; is passively involved in Tx.       Dimension 5: Relapse / Continued Problem Potential -   Previous Dimension Rating: 3  Current Dimension Rating:  3  Relapses this week - None  Urges to use - None  UA results - None taken this week  Narrative- Poor recognition and understanding of relapse and recidivism issues and displays moderately high vulnerability for further substance use or mental health problems. Has few coping skills, rarely applied.      Dmension 6: Recovery Environment " -   Previous Dimension Rating:  3  Current Dimension Ratin  Family Involvement -   Summarize attendance at family groups and family sessions - None  Family supportive of treatment?  Client reports family is supportive    Community support group attendance - None  Recreational activities - Gardening    Narrative -  Engaged in structured, meaningful activity     Justification for Continued Treatment at this Level of Care:  Poor recognition and understanding of relapse and recidivism issues and displays moderately high vulnerability for further substance use or mental health problems. Has few coping skills, rarely applied.  Discharge Planning:  Target Discharge Date/Timeframe:  21   Med Mgmt Provider/Appt:     Ind therapy Provider/Appt:     Family therapy Provider/Appt:          Has vulnerable adult status change? No    Service Coordination:  NA    Supervision:  NA    Are Treatment Plan goals/objectives effective? Yes  *If no, list changes to treatment plan:    Are the current goals meeting client's needs? Yes  *If no, list the changes to treatment plan.    Client Input / Response: NA      *Client agrees with any changes to the treatment plan: N/A  *Client received copy of changes: Yes  *Client is aware of right to access a treatment plan review: Yes

## 2021-04-07 NOTE — GROUP NOTE
Group Therapy Documentation    PATIENT'S NAME: Monty Cox  MRN:   [id number]  :   1965  ACCT. NUMBER: [id number]  DATE OF SERVICE: 21  START TIME:  5:30 PM  END TIME:  7:30 PM  FACILITATOR(S): Hermelindo Mann LADC  TOPIC: BEH Group Therapy  Number of patients attending the group:  9  Group Length:  2 Hours    Group Therapy Type: Psychoeducation    Summary of Group / Topics Discussed:    Personal Strengths Exploration    Telemedicine Visit: The patient's condition can be safely assessed and treated via synchronous audio and visual telemedicine encounter.      Reason for Telemedicine Visit: Services only offered telehealth    Originating Site (Patient Location): Patient's home    Distant Site (Provider Location): Provider Remote Setting    Consent:  The patient/guardian has verbally consented to: the potential risks and benefits of telemedicine (video visit) versus in person care; bill my insurance or make self-payment for services provided; and responsibility for payment of non-covered services.     Mode of Communication:  Video Conference via [employer]    As the provider I attest to compliance with applicable laws and regulations related to telemedicine.       Group Attendance:  Attended group session    Patient's response to the group topic/interactions:  cooperative with task    Patient appeared to be Attentive.        Client specific details:  Clt checked in sharing he has a job interview tomorrow for a job he really wants. Peers supported him and offered encouragement. Clt reported he has a sponsor and is re-connecting with sober friends in his AA meeting. Clt used knowledge of core values and strengths to identify goals that are congruent with those values and build self esteem.  This related to Client Dim 3 goal: Learn how to apply self-care and psychotherapy to build a repertoire of daily habits that counteract PAWS, fatigue, depression and anxiety leading to increased resiliency and  well-being..

## 2021-04-07 NOTE — GROUP NOTE
Group Therapy Documentation    PATIENT'S NAME: Monty Cox  MRN:   [id number]  :   1965  ACCT. NUMBER: [id number]  DATE OF SERVICE: 21  START TIME:  5:30 PM  END TIME:  8:30 PM  FACILITATOR(S): Kayce Dahl LICSW  TOPIC: BEH Group Therapy  Number of patients attending the group:  10  Group Length:  3 Hours    Group Therapy Type: Addiction    Summary of Group / Topics Discussed:    Psychoeducation/Skills Mindfulness in Action (IOP)  Learn what mindfulness is and how to practice it. Learn how to increase awareness of the present moment, thereby reducing ruminating thoughts and learn how to embrace negative emotions instead of suppressing or avoiding them. Consistent practice has been shown to decrease reactivity and help facilitate effective action to make positive changes in behavior.     Objective(s):    Identify 2 skills to increase awareness of the present moment to reduce negative impact of ruminating thoughts    Describe how to embrace negative emotions instead of suppressing or avoiding them.    Give 1 example of how mindfulness works in the brain to calm the Survival part of the Midbrain (which is overactive in addiction)    Give1 example of how mindfulness practice may decrease reactivity and facilitate effective action    Structure (modalities, homework, worksheets, etc.):    Complete  before and after  worksheet for mindfulness practice ([place] Exercise)    Participate in Awareness of Sounds meditation    Participate in Mindful Eating meditation    Complete Mindful Listening exercise with other group member(s) (Dyads or Triads)    Participate in Sitting Meditation     Expected therapeutic outcome(s):     Increased ability to remain in present moment    Increased ability to tolerate negative emotions without returning to addiction    Therapeutic outcome(s) measured by:   Teachback and group review and evaluation form.  Telemedicine Visit: The patient's condition can be safely assessed  "and treated via synchronous audio and visual telemedicine encounter.      Reason for Telemedicine Visit: Services only offered telehealth     Originating Site (Patient Location): Patient's home     Distant Site (Provider Location): Provider Remote Setting     Consent:  The patient/guardian has verbally consented to: the potential risks and benefits of telemedicine (video visit) versus in person care; bill my insurance or make self-payment for services provided; and responsibility for payment of non-covered services.      Mode of Communication:  Video Conference via [employer] group     As the provider I attest to compliance with applicable laws and regulations related to telemedicine.        Group Attendance:  Attended group session    Patient's response to the group topic/interactions:  discussed personal experience with topic, expressed readiness to alter behaviors and expressed understanding of topic    Patient appeared to be Attentive and Engaged.        Client specific details:  This was Monty's first session with this therapist and he shared what brought him into treatment and that his DOC is alcohol and his last use date is 2/01/21. He has had 6-7 years of sobriety and his relapse was severe enough to put him in the ICU followed by a hospital stay. He has a lot of remorse from this and he is stressed by the fact that both his parents have a form of dementia. He rated his stress at 7 with 10 highest. Monty felt the mindful listening exercise in dyads (breakout) rooms was beneficial and added, \"I liked it, it was nice, I thought it would be hard to respond but I was able to open up.\" He said they found they had things in common. The [place] exercise reflected negative perspective on page 1 and shifted \"to asking for help, communicating and using the community\" on page 2. This topic completes one of the six MH Skills Groups required under D3 of his Treatment Plan.      "

## 2021-04-12 ENCOUNTER — HOSPITAL ENCOUNTER (OUTPATIENT)
Dept: BEHAVIORAL HEALTH | Facility: CLINIC | Age: 56
End: 2021-04-12
Attending: FAMILY MEDICINE
Payer: COMMERCIAL

## 2021-04-12 PROCEDURE — H2035 A/D TX PROGRAM, PER HOUR: HCPCS | Mod: HQ,GT
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
## 2021-04-13 ENCOUNTER — HOSPITAL ENCOUNTER (OUTPATIENT)
Dept: BEHAVIORAL HEALTH | Facility: CLINIC | Age: 56
End: 2021-04-13
Attending: FAMILY MEDICINE
Payer: COMMERCIAL

## 2021-04-13 PROCEDURE — H2035 A/D TX PROGRAM, PER HOUR: HCPCS | Mod: HQ,GT | Performed by: SOCIAL WORKER

## 2021-04-14 ENCOUNTER — HOSPITAL ENCOUNTER (OUTPATIENT)
Dept: BEHAVIORAL HEALTH | Facility: CLINIC | Age: 56
End: 2021-04-14
Attending: FAMILY MEDICINE
Payer: COMMERCIAL

## 2021-04-14 PROCEDURE — H2035 A/D TX PROGRAM, PER HOUR: HCPCS | Mod: HQ,GT
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
## 2021-04-15 ENCOUNTER — HOSPITAL ENCOUNTER (OUTPATIENT)
Dept: BEHAVIORAL HEALTH | Facility: CLINIC | Age: 56
End: 2021-04-15
Attending: FAMILY MEDICINE
Payer: COMMERCIAL

## 2021-04-15 PROCEDURE — H2035 A/D TX PROGRAM, PER HOUR: HCPCS | Mod: HQ,95
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
## 2021-04-19 ENCOUNTER — HOSPITAL ENCOUNTER (OUTPATIENT)
Dept: BEHAVIORAL HEALTH | Facility: CLINIC | Age: 56
End: 2021-04-19
Attending: FAMILY MEDICINE
Payer: COMMERCIAL

## 2021-04-19 PROCEDURE — H2035 A/D TX PROGRAM, PER HOUR: HCPCS | Mod: HQ,GT

## 2021-04-19 NOTE — GROUP NOTE
Group Therapy Documentation    PATIENT'S NAME: Monty Cox  MRN:   [id number]  :   1965  ACCT. NUMBER: [id number]  DATE OF SERVICE: 21  START TIME:  5:30 PM  END TIME:  7:30 PM  FACILITATOR(S): Hermelindo Mann LADC  TOPIC: BEH Group Therapy  Number of patients attending the group:  7  Group Length:  2 Hours    Group Therapy Type: Life skill(s)    Summary of Group / Topics Discussed:    Coping Skills/Lifestyle Managemet and Self-Care Activities    Telemedicine Visit: The patient's condition can be safely assessed and treated via synchronous audio and visual telemedicine encounter.      Reason for Telemedicine Visit: Services only offered telehealth    Originating Site (Patient Location): Patient's home    Distant Site (Provider Location): Provider Remote Setting    Consent:  The patient/guardian has verbally consented to: the potential risks and benefits of telemedicine (video visit) versus in person care; bill my insurance or make self-payment for services provided; and responsibility for payment of non-covered services.     Mode of Communication:  Video Conference via food.de    As the provider I attest to compliance with applicable laws and regulations related to telemedicine.        Group Attendance:  Attended group session    Patient's response to the group topic/interactions:  cooperative with task    Patient appeared to be Engaged.        Client specific details:  Client processed in group tonight how his self talk has been kind of sad and that his anxiety is spiking. He shared that it really uncomfortable to go to the grocery store and have police and  personnel roaming the parking lot looking at license plates.He shared whats helping is talking to people like his sponsor. He shared that he started a new job and was able to help on a project that cut the time line in half and that his boss was really impressed.

## 2021-04-20 ENCOUNTER — HOSPITAL ENCOUNTER (OUTPATIENT)
Dept: BEHAVIORAL HEALTH | Facility: CLINIC | Age: 56
End: 2021-04-20
Attending: FAMILY MEDICINE
Payer: COMMERCIAL

## 2021-04-20 PROCEDURE — H2035 A/D TX PROGRAM, PER HOUR: HCPCS | Mod: HQ,95 | Performed by: SOCIAL WORKER

## 2021-04-21 ENCOUNTER — HOSPITAL ENCOUNTER (OUTPATIENT)
Dept: BEHAVIORAL HEALTH | Facility: CLINIC | Age: 56
End: 2021-04-21
Attending: FAMILY MEDICINE
Payer: COMMERCIAL

## 2021-04-21 PROCEDURE — H2035 A/D TX PROGRAM, PER HOUR: HCPCS | Mod: HQ,95
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
## 2021-04-21 NOTE — GROUP NOTE
Group Therapy Documentation    PATIENT'S NAME: Monty Cox  MRN:   [id number]  :   1965  ACCT. NUMBER: [id number]  DATE OF SERVICE: 21  START TIME:  5:30 PM  END TIME:  7:30 PM  FACILITATOR(S): Hermelindo Mann LADC  TOPIC: BEH Group Therapy  Number of patients attending the group:    Group Length:  2 Hours    Group Therapy Type: Addiction    Summary of Group / Topics Discussed:    Coping Skills/Lifestyle Managemet    Telemedicine Visit: The patient's condition can be safely assessed and treated via synchronous audio and visual telemedicine encounter.      Reason for Telemedicine Visit: Services only offered telehealth    Originating Site (Patient Location): Patient's home    Distant Site (Provider Location): Provider Remote Setting    Consent:  The patient/guardian has verbally consented to: the potential risks and benefits of telemedicine (video visit) versus in person care; bill my insurance or make self-payment for services provided; and responsibility for payment of non-covered services.     Mode of Communication:  Video Conference via [employer]    As the provider I attest to compliance with applicable laws and regulations related to telemedicine.        Group Attendance:  Attended group session    Patient's response to the group topic/interactions:  cooperative with task    Patient appeared to be Engaged.        Client specific details:  Client continued processing in group various coping skill and which ones they find most efficacious. Clients were introduced to Mindfulness practices and processed the potential benefits of practicing. Clt shared that he is enjoying mindfulness meditation and liked hearing about the research that supports how 1 week can boost resilience to negative emotions.  .
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
## 2021-04-22 ENCOUNTER — HOSPITAL ENCOUNTER (OUTPATIENT)
Dept: BEHAVIORAL HEALTH | Facility: CLINIC | Age: 56
End: 2021-04-22
Attending: FAMILY MEDICINE
Payer: COMMERCIAL

## 2021-04-22 PROCEDURE — H2035 A/D TX PROGRAM, PER HOUR: HCPCS | Mod: HQ,95

## 2021-04-22 NOTE — GROUP NOTE
Group Therapy Documentation    PATIENT'S NAME: Monty Cox  MRN:   [id number]  :   1965  ACCT. NUMBER: [id number]  DATE OF SERVICE: 21  START TIME:  5:30 PM  END TIME:  7:30 PM  FACILITATOR(S): Hermelindo Mann LADC  TOPIC: BEH Group Therapy  Number of patients attending the group:  6  Group Length:  2 Hours    Group Therapy Type: Life skill(s)    Summary of Group / Topics Discussed:    Meditation/Breathing Exercises, Mindfulness, and Trust       Telemedicine Visit: The patient's condition can be safely assessed and treated via synchronous audio and visual telemedicine encounter.      Reason for Telemedicine Visit: Services only offered telehealth    Originating Site (Patient Location): Patient's home    Distant Site (Provider Location): Provider Remote Setting    Consent:  The patient/guardian has verbally consented to: the potential risks and benefits of telemedicine (video visit) versus in person care; bill my insurance or make self-payment for services provided; and responsibility for payment of non-covered services.     Mode of Communication:  Video Conference via PetBox    As the provider I attest to compliance with applicable laws and regulations related to telemedicine.        Group Attendance:  Attended group session    Patient's response to the group topic/interactions:  cooperative with task    Patient appeared to be Attentive.        Client specific details:  Client specific details:  Client participated in the check-in process and reported no change in sobriety date. Client participated in group introductions followed by a brief guided meditation for mindfulness skill development and a group reading from a daily inspirational book. Group topic for discussion was Mindfulness and to pay attention to the present moment, without judgement. Reviewed the components of mindfulness being Awareness and Acceptance. Clt shared that he's working on acceptance and id seems to help him stay in  the present more as he processes things that are happening during hte day. He feels its giving improved emotional responsivity vs reactivity.Group reviewed an article by Elias Freire that discussed the benefits of mindfulness practices.  Client indicated he would try practice increasing awareness to these situations using grounding or mindfulness techniques.Client said he is going to commit to practice grounding techniques and will report back to group. He aslo will be speaking with his sponsor as action to support recovery.

## 2021-04-26 ENCOUNTER — HOSPITAL ENCOUNTER (OUTPATIENT)
Dept: BEHAVIORAL HEALTH | Facility: CLINIC | Age: 56
End: 2021-04-26
Attending: FAMILY MEDICINE
Payer: COMMERCIAL

## 2021-04-26 PROCEDURE — H2035 A/D TX PROGRAM, PER HOUR: HCPCS | Mod: HQ,95

## 2021-04-26 NOTE — GROUP NOTE
Group Therapy Documentation    PATIENT'S NAME: Monty Cox  MRN:   [id number]  :   1965  [id number]  DATE OF SERVICE: 21  START TIME:  5:30 PM  END TIME:  7:30 PM  FACILITATOR(S): Hermelidno Mann LADC  TOPIC: BEH Group Therapy  Number of patients attending the group:  7  Group Length:  2 Hours    Group Therapy Type: Addiction    Summary of Group / Topics Discussed:    Psychoeducation/Skills Relapse Prevention (ALEXA)  This topic will give a general overview of basic relapse prevention, including definitions of warning signs, triggers and cravings and the importance of addressing healthy coping skills for ongoing relapse prevention. Client explored the topic of Relapse prevention by learning how predictable and identifiable warning signs that accumulate and snowball and can play a role in triggering the addictive thinking that precedes a return to use episode.   This relates to client Dim 5 treatment plan goal.    Objective(s):    Patients will identify 4 key warning signs and triggers    Patients will identify 4 healthy coping mechanisms         Structure (modalities, homework, worksheets, etc.):    Provide psychoeducation on relapse prevention and healthy coping methods.    Facilitate group discussion around each patient s current awareness of warning signs,  triggers and cravings.     Expected therapeutic outcome(s):    Patient will:    Be able to manage triggers and cravings without returning to substance use.      Therapeutic outcomes measured by:    Patients will name 4 of their warning signs and triggers    Patients will name 4 healthy coping mechanisms for dealing with their warning signs and triggers      Telemedicine Visit: The patient's condition can be safely assessed and treated via synchronous audio and visual telemedicine encounter.      Reason for Telemedicine Visit: Services only offered telehealth    Originating Site (Patient Location): Patient's home    Distant Site  "(Provider Location): Provider Remote Setting    Consent:  The patient/guardian has verbally consented to: the potential risks and benefits of telemedicine (video visit) versus in person care; bill my insurance or make self-payment for services provided; and responsibility for payment of non-covered services.     Mode of Communication:  Video Conference via [employer]    As the provider I attest to compliance with applicable laws and regulations related to telemedicine.        Group Attendance:  Attended group session    Patient's response to the group topic/interactions:  cooperative with task    Patient appeared to be Engaged.        Client specific details:  Client participated in weekly check-in process with group and identified 3 reasons to stay sober. Clt shared he has been practicing meditation and mindfulness. Clt  Shared his self talk has been \"tired\". He thinks he is actively experiencing anhedonia and that it was good to learn about and know its temporary.. Client explored the topic of Relapse prevention by learning how predictable and identifiable warning signs that accumulate and snowball and can play a role in triggering the addictive thinking that precedes a return to use episode. Clt shared that his high risk situations have been when he lost his job and stress became a major factor. He shared he is effectively dealing with stres by practicing mindfulness.  "

## 2021-04-27 ENCOUNTER — HOSPITAL ENCOUNTER (OUTPATIENT)
Dept: BEHAVIORAL HEALTH | Facility: CLINIC | Age: 56
End: 2021-04-27
Attending: FAMILY MEDICINE
Payer: COMMERCIAL

## 2021-04-27 PROCEDURE — H2035 A/D TX PROGRAM, PER HOUR: HCPCS | Mod: HQ,GT | Performed by: SOCIAL WORKER

## 2021-04-28 ENCOUNTER — HOSPITAL ENCOUNTER (OUTPATIENT)
Dept: BEHAVIORAL HEALTH | Facility: CLINIC | Age: 56
End: 2021-04-28
Attending: FAMILY MEDICINE
Payer: COMMERCIAL

## 2021-04-28 PROCEDURE — H2035 A/D TX PROGRAM, PER HOUR: HCPCS | Mod: HQ,GT

## 2021-04-28 NOTE — PROGRESS NOTES
[facility] Weekly Treatment Plan Review      ATTENDANCE for the following date span:  Monday, Monday, 21 thru , 21.    Day     Group Therapy 2.0 hours 3 hours 2.0 hours 2.0 hours   Individual Therapy       Family Therapy       Other (Specify)           Patient did not have any absences during this time period (list absence dates and reason for absence).        Weekly Treatment Plan Review     Treatment Plan initiated on: 21.    Dimension1: Acute Intoxication/Withdrawal Potential -   Previous Dimension Ratin  Current Dimension Ratin  Date of Last Use 21  Any reports of withdrawal symptoms - No        Dimension 2: Biomedical Conditions & Complications -   Previous Dimension Ratin  Current Dimension Ratin  Medical Concerns:    Current Medications & Medication Changes:  Current Outpatient Medications   Medication     ACCU-CHEK GUIDE test strip     acetaminophen (TYLENOL) 325 MG tablet     allopurinol (ZYLOPRIM) 100 MG tablet     allopurinol (ZYLOPRIM) 100 MG tablet     blood glucose (ACCU-CHEK GUIDE) test strip     blood glucose monitoring (ACCU-CHEK ERIKA PLUS) meter device kit     blood glucose monitoring (SOFTCLIX) lancets     blood glucose monitoring (SOFTCLIX) lancets     Blood Glucose Monitoring Suppl (ACCU-CHEK GUIDE) w/Device KIT     buPROPion (WELLBUTRIN XL) 150 MG 24 hr tablet     calcium carbonate 500 mg, elemental, (OSCAL 500) 1250 (500 Ca) MG TABS tablet     darbepoetin veronika (ARANESP) 40 MCG/ML injection     ergocalciferol (ERGOCALCIFEROL) 1.25 MG (66579 UT) capsule     furosemide (LASIX) 80 MG tablet     furosemide (LASIX) 80 MG tablet     gabapentin (NEURONTIN) 100 MG capsule     gabapentin (NEURONTIN) 100 MG capsule     lactase (LACTAID) 3000 UNIT tablet     multivitamin RENAL (NEPHROCAPS/TRIPHROCAPS) 1 MG capsule     No current facility-administered medications for this encounter.      Medication [place]   [place] [place]s as prescribed? Yes  Medication side effects or concerns:  NA  Outside medical appointments this week (list provider and reason for visit):  Dialysis        Dimension 3: Emotional/Behavioral Conditions & Complications -   Previous Dimension Ratin  Current Dimension Ratin  PHQ9     PHQ-9 SCORE 3/22/2021   PHQ-9 Total Score 9     GAD7     PILI-7 SCORE 3/22/2021   Total Score 10     Mental health diagnosis Patient reported the following previous mental health diagnoses: \"depression and anxiety\".    Date of last SIB:  NA  Date of  last SI:  NA  Date of last HI: NA  Behavioral Targets:  increase impulse control and  coping skills.   Current MH Assignments:  Mindfulness and 478 Breathing    Narrative:     Difficulty with impulse control and lacks coping skills. Thoughts of suicide or harm to others without plan or means; however, the thoughts may interfere with participation in some Tx activities. Difficulty functioning in significant life areas. Moderate symptoms of emotional, behavioral, or cognitive problems. Able to participate in most Tx activities. Clt reports growing fear/sadness of civil unrest and potential for violence in his neighborhood.         Dimension 4: Treatment Acceptance / Resistance -   Previous Dimension Ratin  Current Dimension Ratin  ALEXA Diagnosis:  Alcohol Use Disorder   303.90 (F10.20) Severe   Stage - 2  Commitment to tx process/Stage of change- Prep  ALEXA assignments - 10 Worst Consequences    Narrative -    Displays verbal compliance, but lacks consistent behaviors; has low motivation for change; is passively involved in Tx. Is reconnecting with sponsor and sober support.       Dimension 5: Relapse / Continued Problem Potential -   Previous Dimension Rating: 3  Current Dimension Ratin  Relapses this week - None  Urges to use - None  UA results - None taken this week  Narrative- Poor recognition and understanding of relapse and recidivism issues and " displays moderately high vulnerability for further substance use or mental health problems. Has few coping skills, rarely applied.Client reports reconnecting with sober support and sponsor.Clt reports desire to exercise for self care.      Dmension 6: Recovery Environment -   Previous Dimension Rating:  3  Current Dimension Ratin  Family Involvement -   Summarize attendance at family groups and family sessions - None  Family supportive of treatment?  Client reports family is supportive    Community support group attendance - None  Recreational activities - Gardening    Narrative -  Engaged in structured, meaningful activity. Client reports reconnecting with sober support and sponsor. Client reports starting full time employment at job he is excited about.    Justification for Continued Treatment at this Level of Care:  Poor recognition and understanding of relapse and recidivism issues and displays moderately high vulnerability for further substance use or mental health problems. Has few coping skills, rarely applied.  Discharge Planning:  Target Discharge Date/Timeframe:  21   Med Mgmt Provider/Appt:     Ind therapy Provider/Appt:     Family therapy Provider/Appt:          Has vulnerable adult status change? No    Service Coordination:  NA    Supervision:  NA    Are Treatment Plan goals/objectives effective? Yes  *If no, list changes to treatment plan:    Are the current goals meeting client's needs? Yes  *If no, list the changes to treatment plan.    Client Input / Response: NA      *Client agrees with any changes to the treatment plan: N/A  *Client received copy of changes: Yes  *Client is aware of right to access a treatment plan review: Yes

## 2021-04-28 NOTE — GROUP NOTE
Group Therapy Documentation    PATIENT'S NAME: Monty Cox  MRN:   [id number]  :   1965  ACCT. NUMBER: [id number]  DATE OF SERVICE: 21  START TIME:  5:30 PM  END TIME:  8:30 PM  FACILITATOR(S): Kayce Dahl LICSW  TOPIC: BEH Group Therapy  Number of patients attending the group:  10  Group Length:  3 Hours    Group Therapy Type: Addiction    Summary of Group / Topics Discussed:    Psychoeducation/Skills Self-Care and Humor in Recovery (IOP)  Learn how powerful self-care is in healing from addiction and building resiliency in mental and physical health.  Learn many aspects of self-care linked to elevating moods, increasing energy reserve and staminal, thinking clearer, focusing better on tasks and improving organizational skills.     Objective(s):    Name 3 reasons self-care is crucial for optimal health and recovery from illness.      Identify 2 mental and physical illnesses directly linked to poor self-care.    Identify 3 ways social connections build resilience and may strengthen our immune systems.    Create a personal plan of action to add to daily schedule of structure and routine.     Structure (modalities, homework, worksheets, etc.):    Self-Care quiz (worksheet)    Education on Self-Care with emphasis on Recovery (PowerPoint)    Why Self-care is a necessity, not a luxury (discussion/white board)    Worksheet to explore and prioritize needs    Action plan to start this week    Expected therapeutic outcome(s):     Be more proactive in their mental and physical health     Start their personal self-care plan this week and note results in journal    Take an inventory of choices and behaviors that hinder their health and have a negative effect on the quality of their lives.      Work to remove the obstacles that keep them from these aspects of self-care.     Therapeutic outcome(s) measured by:   Teachback, creation of personal action plan, and group review and evaluation  Telemedicine  "Visit: The patient's condition can be safely assessed and treated via synchronous audio and visual telemedicine encounter.      Reason for Telemedicine Visit: Services only offered telehealth     Originating Site (Patient Location): Patient's home     Distant Site (Provider Location): Provider Remote Setting     Consent:  The patient/guardian has verbally consented to: the potential risks and benefits of telemedicine (video visit) versus in person care; bill my insurance or make self-payment for services provided; and responsibility for payment of non-covered services.      Mode of Communication:  Video Conference via [employer]     As the provider I attest to compliance with applicable laws and regulations related to telemedicine.        Group Attendance:  Attended group session    Patient's response to the group topic/interactions:  discussed personal experience with topic, expressed readiness to alter behaviors and expressed understanding of topic    Patient appeared to be Actively participating.        Client specific details:  Monty shared that he had a busy week which he enjoyed in spite of the cold, rainy weather. He is using mindfulness and bringing himself into the moment more often. His stress is a5 6 due to feeling overwhelmed and \"I had to reign myself in\" as he volunteers for things that he doesn't have the time for. On the worksheet he chose the category of Exercise and this week will take the action step of \"walking at least 3 times this week for 20 minutes each.\" This topic completes one of the six MH Skills Groups required under D3 of his Treatment Plan.       "

## 2021-04-28 NOTE — GROUP NOTE
Group Therapy Documentation    PATIENT'S NAME: Monty Cox  MRN:   [id number]  :   1965  [id number]  DATE OF SERVICE: 21  START TIME:  5:30 PM  END TIME:  7:30 PM  FACILITATOR(S): Hermelindo Mann LADC  TOPIC: BEH Group Therapy  Number of patients attending the group:  7  Group Length:  2 Hours    Group Therapy Type: Addiction    Summary of Group / Topics Discussed:    Complete Relapse Prevention Topic with group to process Denial and Resistance/Barrriers to Treatment.    Telemedicine Visit: The patient's condition can be safely assessed and treated via synchronous audio and visual telemedicine encounter.      Reason for Telemedicine Visit: Services only offered telehealth    Originating Site (Patient Location): Patient's home    Distant Site (Provider Location): Provider Remote Setting    Consent:  The patient/guardian has verbally consented to: the potential risks and benefits of telemedicine (video visit) versus in person care; bill my insurance or make self-payment for services provided; and responsibility for payment of non-covered services.     Mode of Communication:  Video Conference via [employer]    As the provider I attest to compliance with applicable laws and regulations related to telemedicine.        Group Attendance:  Attended group session    Patient's response to the group topic/interactions:  cooperative with task    Patient appeared to be Attentive.        Client specific details:  Client participated in group introductions followed by a brief guided meditation for mindfulness skill development and a group reading from a daily inspirational book. Client explored the topic of Relapse prevention by learning how predictable and identifiable warning signs that accumulate and snowball and can play a role in triggering the addictive thinking that precedes a return to use episode.  Client spent group time exploring and identifying their triggers people, places and things)  and Broderick edmondson Warning Signs and Phases of a relapse. 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

## 2021-04-29 ENCOUNTER — HOSPITAL ENCOUNTER (OUTPATIENT)
Dept: BEHAVIORAL HEALTH | Facility: CLINIC | Age: 56
End: 2021-04-29
Attending: FAMILY MEDICINE
Payer: COMMERCIAL

## 2021-04-29 PROCEDURE — H2035 A/D TX PROGRAM, PER HOUR: HCPCS | Mod: HQ,GT

## 2021-04-29 NOTE — GROUP NOTE
Group Therapy Documentation    PATIENT'S NAME: Monty Cox  MRN:   [id number]  :   1965  [id number]  DATE OF SERVICE: 21  START TIME:  5:30 PM  END TIME:  7:30 PM  FACILITATOR(S): Hermelindo Mann LADC  TOPIC: BEH Group Therapy  Number of patients attending the group:  8  Group Length:  2 Hours    Group Therapy Type: Addiction and Psychoeducation     Summary of Group / Topics Discussed:    Psychoeducation/Skills Relapse Prevention (ALEXA)  This topic will give a general overview of basic relapse prevention, including definitions of warning signs, triggers and cravings and the importance of addressing healthy coping skills for ongoing relapse prevention. Client explored the topic of Relapse prevention by learning how predictable and identifiable warning signs that accumulate and snowball and can play a role in triggering the addictive thinking that precedes a return to use episode.      Objective(s):  Patients will identify 4 key warning signs and triggers  Patients will identify 4 healthy coping mechanisms         Structure (modalities, homework, worksheets, etc.):  Provide psychoeducation on relapse prevention and healthy coping methods.  Facilitate group discussion around each patient s current awareness of warning signs,  triggers and cravings.     Expected therapeutic outcome(s):    Patient will:  Be able to manage triggers and cravings without returning to substance use.      Therapeutic outcomes measured by:  Patients will name 4 of their warning signs and triggers  Patients will name 4 healthy coping mechanisms for dealing with their warning signs and triggers    Telemedicine Visit: The patient's condition can be safely assessed and treated via synchronous audio and visual telemedicine encounter.      Reason for Telemedicine Visit: Services only offered telehealth    Originating Site (Patient Location): Patient's home    Distant Site (Provider Location): Provider [facility]  Setting    Consent:  The patient/guardian has verbally consented to: the potential risks and benefits of telemedicine (video visit) versus in person care; bill my insurance or make self-payment for services provided; and responsibility for payment of non-covered services.     Mode of Communication:  Video Conference via [employer]    As the provider I attest to compliance with applicable laws and regulations related to telemedicine.        Group Attendance:  {Group Attendance:855594}    Patient's response to the group topic/interactions:  {OPBEHCLIENTRESPONSE:011580}    Patient appeared to be {Engagement:840137}.        Client specific details:  ***.

## 2021-04-29 NOTE — GROUP NOTE
"Group Therapy Documentation    PATIENT'S NAME: Monty Cox  MRN:   [id number]  :   1965  ACCT. NUMBER: [id number]  DATE OF SERVICE: 21  START TIME:  5:30 PM  END TIME:  7:30 PM  FACILITATOR(S): Hermelindo Mann LADC  TOPIC: BEH Group Therapy  Number of patients attending the group:  7  Group Length:  2 Hours    Group Therapy Type: Addiction    Summary of Group / Topics Discussed:    Barriers to Treatment and Denial    Telemedicine Visit: The patient's condition can be safely assessed and treated via synchronous audio and visual telemedicine encounter.      Reason for Telemedicine Visit: Services only offered telehealth    Originating Site (Patient Location): Patient's home    Distant Site (Provider Location): Provider Remote Setting    Consent:  The patient/guardian has verbally consented to: the potential risks and benefits of telemedicine (video visit) versus in person care; bill my insurance or make self-payment for services provided; and responsibility for payment of non-covered services.     Mode of Communication:  Video Conference via [employer]    As the provider I attest to compliance with applicable laws and regulations related to telemedicine.       Group Attendance:  Attended group session    Patient's response to the group topic/interactions:  cooperative with task    Patient appeared to be Engaged.        Client specific details:  Client participated in weekly check-in process with group and identified 3 reasons to stay sober. Group participated in brief mediation followed by reading and discussion from Daily Reflections. Client participated in group discussion about the topics \"Barriers to Recovery and Denial., Excuses to Drink and  HOW \". Client participated in discussion about coping skills and the need to have sober support and its potential to impact recovery in the future. Clt shared that an ongoing \"excuse to Drink\" is the continued deterioration of his mothers dementia. He " shared that the feelings are an intense combination of grief and sadness that have historically triggered use. He is going to reconnect with a support group and try prayer. Clt said he is going to really try and get exercise this weekend.

## 2021-05-03 ENCOUNTER — HOSPITAL ENCOUNTER (OUTPATIENT)
Dept: BEHAVIORAL HEALTH | Facility: CLINIC | Age: 56
End: 2021-05-03
Attending: FAMILY MEDICINE
Payer: COMMERCIAL

## 2021-05-03 PROCEDURE — H2035 A/D TX PROGRAM, PER HOUR: HCPCS | Mod: HQ,GT

## 2021-05-03 NOTE — GROUP NOTE
Group Therapy Documentation    PATIENT'S NAME: Monty Cox  MRN:   [id number]  :   1965  ACCT. NUMBER: [id number]  DATE OF SERVICE: 21  START TIME:  5:30 PM  END TIME:  7:30 PM  FACILITATOR(S): Hermelindo Mann LADC  TOPIC: BEH Group Therapy  Number of patients attending the group:  7  Group Length:  2 Hours    Group Therapy Type: Addiction    Summary of Group / Topics Discussed:    Denial, Acceptance and Keys to Happiness/Well being      Telemedicine Visit: The patient's condition can be safely assessed and treated via synchronous audio and visual telemedicine encounter.      Reason for Telemedicine Visit: Services only offered telehealth    Originating Site (Patient Location): Patient's home    Distant Site (Provider Location): Provider Remote Setting    Consent:  The patient/guardian has verbally consented to: the potential risks and benefits of telemedicine (video visit) versus in person care; bill my insurance or make self-payment for services provided; and responsibility for payment of non-covered services.     Mode of Communication:  Video Conference via [employer]    As the provider I attest to compliance with applicable laws and regulations related to telemedicine.        Group Attendance:  Attended group session    Patient's response to the group topic/interactions:  cooperative with task    Patient appeared to be Attentive.        Client specific details:  Client participated in the check-in process and reported no change in sobriety date. Client identified acceptance as something he she will try in an effort to reduce anxiety and become more happy and healthier.  Client stated that  acceptance to me means being hopeful. He descibed a time when he was resigned to being an alcoholic as what he was and then he experienced a sliver of hope for a better future which begins with a better today than yesterday.  This relates to client Dim 4. Clt watched a portion of the video Elias to Self and  discussed why structure is necessary to support recovery.

## 2021-05-04 ENCOUNTER — HOSPITAL ENCOUNTER (OUTPATIENT)
Dept: BEHAVIORAL HEALTH | Facility: CLINIC | Age: 56
End: 2021-05-04
Attending: FAMILY MEDICINE
Payer: COMMERCIAL

## 2021-05-04 PROCEDURE — H2035 A/D TX PROGRAM, PER HOUR: HCPCS | Mod: HQ,GT | Performed by: SOCIAL WORKER

## 2021-05-05 ENCOUNTER — HOSPITAL ENCOUNTER (OUTPATIENT)
Dept: BEHAVIORAL HEALTH | Facility: CLINIC | Age: 56
End: 2021-05-05
Attending: FAMILY MEDICINE
Payer: COMMERCIAL

## 2021-05-05 PROCEDURE — H2035 A/D TX PROGRAM, PER HOUR: HCPCS | Mod: HQ,95

## 2021-05-05 NOTE — GROUP NOTE
Group Therapy Documentation    PATIENT'S NAME: Monty Cox  MRN:   [id number]  [facility]   1965  [id number]  DATE OF SERVICE: 21  START TIME:  5:30 PM  END TIME:  7:30 PM  FACILITATOR(S): Hermelindo Mann LADC  TOPIC: BEH Group Therapy  Number of patients attending the group:  8  Group Length:  2 Hours    Group Therapy Type: Life skill(s)    Summary of Group / Topics Discussed:    Psychoeducation/Skills Recovery Skills (ALEXA)  This topic will give a general overview of the importance of Recovery Skills and specifics with regard to applying them on a regular basis in early sobriety.    Objective(s):    Client will identify at least 4 Recovery Program Skills to be put in practice during [facility] treatment program.    Client will identify what the difference is between Recovery Program Skills and Sober Activities.    Client will explain the importance of practicing Recovery Program Skills and building Recovery Resilience as a result.    Structure:    Provide psychoeducation on the benefits of practicing Recovery Program Skills on a regular basis to build Recovery Resilience.     Provide psychoeducation on how Recovery Skills can be used to minimize effects of Post Acute Withdrawal Symptoms (PAWS).     Provide psychoeducation on how Recovery Skills can be used to increase internal motivation for sobriety and recovery.     Facilitate group discussion around each patient s choices of recovery skills to be put in practice and how they will self-evaluate outcomes.    Facilitate group discussion on concept of  Recovery Resilience  and how that is beneficial in avoiding relapse in early recovery.    Provide patients with handouts to enhance learning.    Expected therapeutic outcomes:     Minimize the severity and occurrence of PAWS symptoms such as sleep disruption, anhedonia, anxiety, mood swings, low energy, etc.    Build recovery resilience against cravings, leading to resilience against relapse in early  sobriety.    Growth in patient  sober network  via practicing skills designed to build healthy social connections in recovery.    Therapeutic outcome(s) measured by:    Patient s ability to explain impact of implementing (regularly putting into practice) Recovery Skills and severity and occurrence of PAWS symptoms each week.    Patient s demonstration of learning and commitment via weekly check in, specifying skills practiced during previous week, increase in motivation for sobriety and recovery using Likert (or similar) scale.    Time spent practicing (multiple) Recovery Skills in a given week as measured by recovery diary or similar tracking tool.    Telemedicine Visit: The patient's condition can be safely assessed and treated via synchronous audio and visual telemedicine encounter.      Reason for Telemedicine Visit: Services only offered telehealth    Originating Site (Patient Location): Patient's home    Distant Site (Provider Location): Provider Remote Setting    Consent:  The patient/guardian has verbally consented to: the potential risks and benefits of telemedicine (video visit) versus in person care; bill my insurance or make self-payment for services provided; and responsibility for payment of non-covered services.     Mode of Communication:  Video Conference via [employer]    As the provider I attest to compliance with applicable laws and regulations related to telemedicine.        Group Attendance:  Attended group session    Patient's response to the group topic/interactions:  cooperative with task    Patient appeared to be Engaged.        Client specific details:  Client participated in weekly check-in process with group and reported no change in sobriety date. Clt shared feeling anxious tonight because his lawn needs to be mowed and because hed had dialysis today. He shared his Dr was not at the clinic today and he had hoped to get the OK to stop dialysis.. Client reported getting exercise of  "walking today.. Clt identified 3 reasons to stay sober, participated in brief guided meditation  to help clients settle into a  present  state for group. Group read a brief  reading from [employer]DIS[employer] and processed how it resonated. Client watched a portion of video \"Elias to Self  and processed the challenges that can be anticipated in early recovery and the recommended habits/tools that can aid in the recovery process. .  "

## 2021-05-05 NOTE — GROUP NOTE
Group Therapy Documentation    PATIENT'S NAME: Monty Cox  MRN:   [id number]  :   1965  ACCT. NUMBER: [id number]  DATE OF SERVICE: 21  START TIME:  5:30 PM  END TIME:  8:30 PM  FACILITATOR(S): Kayce Dahl LICSW  TOPIC: BEH Group Therapy  Number of patients attending the group:  8  Group Length:  3 Hours    Group Therapy Type: Addiction    Summary of Group / Topics Discussed:    Psychoeducation/Skills Stress Management and Addiction    Objective(s):    Identify 2 ways stress is connected to relapse in addiction    Identify 3 ways stress affects physical and mental health    Identify 3 ways to manage stress    Identify 1 healthy daily routine to commit to starting this week.     Structure (modalities, homework, worksheets, etc.):    Stress Management Handout   o Definition of Stress  o Importance of self-awareness to warning signs of stress  o Stress reduction in recovery  o Skills used to reduce stress and counteract its impact    Demonstration and practice of deep breathing exercise     Demonstration and practice of Mindfulness     Demonstration and practice of Progressive Relaxation exercise     Demonstration of Aromatherapy inhalers    Expected therapeutic outcome(s):     Decreased frequency and intensity of PAWS    Increased stamina and resiliency    Improved ability to function at optimal level     Therapeutic outcome(s) measured by:   Teachback and group review and evaluation    Telemedicine Visit: The patient's condition can be safely assessed and treated via synchronous audio and visual telemedicine encounter.      Reason for Telemedicine Visit: Services only offered telehealth     Originating Site (Patient Location): Patient's home     Distant Site (Provider Location): Provider Remote Setting     Consent:  The patient/guardian has verbally consented to: the potential risks and benefits of telemedicine (video visit) versus in person care; bill my insurance or make self-payment for  "services provided; and responsibility for payment of non-covered services.      Mode of Communication:  Video Conference via [employer]     As the provider I attest to compliance with applicable laws and regulations related to telemedicine.       Group Attendance:  Attended group session    Patient's response to the group topic/interactions:  discussed personal experience with topic, expressed readiness to alter behaviors and expressed understanding of topic    Patient appeared to be Actively participating.        Client specific details:  Monty shared that his week has been \"OK\" and that he is getting used to everything at work and things are going back to \"normal.\" His stress is 2-3 and he his cravings have been mild and infrequent. Monty completed the worksheet and he chose to start doing more exercising and mindfulness throughout the day for stress management. The  \"committed action\" step chosen for this week is go for a walk in the woods 2 times this week and journal more. Client was able to name benefits of deep breathing that included \"calming and centering.\" This topic completes one of the six MH Skills Groups required under D3 of his Treatment Plan.        "

## 2021-05-06 ENCOUNTER — HOSPITAL ENCOUNTER (OUTPATIENT)
Dept: BEHAVIORAL HEALTH | Facility: CLINIC | Age: 56
End: 2021-05-06
Attending: FAMILY MEDICINE
Payer: COMMERCIAL

## 2021-05-06 PROCEDURE — H2035 A/D TX PROGRAM, PER HOUR: HCPCS | Mod: HQ,95

## 2021-05-06 NOTE — GROUP NOTE
Group Therapy Documentation    PATIENT'S NAME: Monty Cox  MRN:   [id number]  :   1965  [id number]  DATE OF SERVICE: 21  START TIME:  5:30 PM  END TIME:  7:30 PM  FACILITATOR(S): Hermelindo Mann LADC  TOPIC: BEH Group Therapy  Number of patients attending the group:  7  Group Length:  2 Hours    Group Therapy Type: Life skill(s)    Summary of Group / Topics Discussed:    Coping Skills/Lifestyle Managemet. Clients reviewed elements of recovery structure required to support long term recovery.    Telemedicine Visit: The patient's condition can be safely assessed and treated via synchronous audio and visual telemedicine encounter.      Reason for Telemedicine Visit: Services only offered telehealth    Originating Site (Patient Location): Patient's home    Distant Site (Provider Location): Provider Remote Setting    Consent:  The patient/guardian has verbally consented to: the potential risks and benefits of telemedicine (video visit) versus in person care; bill my insurance or make self-payment for services provided; and responsibility for payment of non-covered services.     Mode of Communication:  Video Conference via [employer]    As the provider I attest to compliance with applicable laws and regulations related to telemedicine.       Group Attendance:  Attended group session    Patient's response to the group topic/interactions:  cooperative with task    Patient appeared to be Attentive.        Client specific details:  Client participated in the check-in process and reported no change in sobriety date.  Clt response to reading was to be aware to and not let the pace of our day become exhausting. Try and take brief mindfulness breaks.A portion of the video  Elias to Self:  Protecting Sobriety with the Science of Safety  was presented.  This session relates to client s Dimension 5 goal to develop sober coping and living skills in order to address the development of a strategy for  long term recovery.  Client expressed that the idea of using multiple levels of relapse prevention techniques was helpful.    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

## 2021-05-06 NOTE — PROGRESS NOTES
[facility] Weekly Treatment Plan Review      ATTENDANCE for the following date span:   through 21    Day     Group Therapy 2.0 hours 3 hours 2.0 hours 2.0 hours   Individual Therapy       Family Therapy       Other (Specify)           Patient did not have any absences during this time period (list absence dates and reason for absence).        Weekly Treatment Plan Review     Treatment Plan initiated on: 21.    Dimension1: Acute Intoxication/Withdrawal Potential -   Previous Dimension Ratin  Current Dimension Ratin  Date of Last Use 21  Any reports of withdrawal symptoms - No        Dimension 2: Biomedical Conditions & Complications -   Previous Dimension Ratin  Current Dimension Ratin  Medical Concerns:    Current Medications & Medication Changes:  Current Outpatient Medications   Medication     ACCU-CHEK GUIDE test strip     acetaminophen (TYLENOL) 325 MG tablet     allopurinol (ZYLOPRIM) 100 MG tablet     allopurinol (ZYLOPRIM) 100 MG tablet     blood glucose (ACCU-CHEK GUIDE) test strip     blood glucose monitoring (ACCU-CHEK ERIKA PLUS) meter device kit     blood glucose monitoring (SOFTCLIX) lancets     blood glucose monitoring (SOFTCLIX) lancets     Blood Glucose Monitoring Suppl (ACCU-CHEK GUIDE) w/Device KIT     buPROPion (WELLBUTRIN XL) 150 MG 24 hr tablet     calcium carbonate 500 mg, elemental, (OSCAL 500) 1250 (500 Ca) MG TABS tablet     darbepoetin veronika (ARANESP) 40 MCG/ML injection     ergocalciferol (ERGOCALCIFEROL) 1.25 MG (44538 UT) capsule     furosemide (LASIX) 80 MG tablet     furosemide (LASIX) 80 MG tablet     gabapentin (NEURONTIN) 100 MG capsule     gabapentin (NEURONTIN) 100 MG capsule     lactase (LACTAID) 3000 UNIT tablet     multivitamin RENAL (NEPHROCAPS/TRIPHROCAPS) 1 MG capsule     No current facility-administered medications for this encounter.      Medication Prescriber:  [facility]  Taking meds  "as prescribed? Yes  Medication side effects or concerns:  NA  Outside medical appointments this week (list provider and reason for visit):  Dialysis        Dimension 3: Emotional/Behavioral Conditions & Complications -   Previous Dimension Ratin  Current Dimension Ratin  PHQ9     PHQ-9 SCORE 3/22/2021   PHQ-9 Total Score 9     GAD7     PILI-7 SCORE 3/22/2021   Total Score 10     Mental health diagnosis Patient reported the following previous mental health diagnoses: \"depression and anxiety\".    Date of last SIB:  NA  Date of  last SI:  NA  Date of last HI: NA  Behavioral Targets:  increase impulse control and  coping skills.   Current MH Assignments:  Mindfulness and 478 Breathing    Narrative:     Difficulty with impulse control and lacks coping skills. Thoughts of suicide or harm to others without plan or means; however, the thoughts may interfere with participation in some Tx activities. Difficulty functioning in significant life areas. Moderate symptoms of emotional, behavioral, or cognitive problems. Able to participate in most Tx activities. Clt reports growing fear/sadness of civil unrest and potential for violence in his neighborhood.         Dimension 4: Treatment Acceptance / Resistance -   Previous Dimension Ratin  Current Dimension Ratin  ALEXA Diagnosis:  Alcohol Use Disorder   303.90 (F10.20) Severe   Stage - 2  Commitment to tx process/Stage of change- Prep  ALEXA assignments - 10 Worst Consequences    Narrative -    Displays verbal compliance, but lacks consistent behaviors; has low motivation for change; is passively involved in Tx. Is reconnecting with sponsor and sober support.       Dimension 5: Relapse / Continued Problem Potential -   Previous Dimension Rating: 3  Current Dimension Ratin  Relapses this week - None  Urges to use - None  UA results - None taken this week  Narrative- Poor recognition and understanding of relapse and recidivism issues and displays some " vulnerability for further substance use or mental health problems. Has few coping skills, rarely applied.Client reports reconnecting with sober support and sponsor.Clt reports desire to exercise for self care.      Dmension 6: Recovery Environment -   Previous Dimension Rating:  3  Current Dimension Ratin  Family Involvement -   Summarize attendance at family groups and family sessions - None  Family supportive of treatment?  Client reports family is supportive    Community support group attendance - None  Recreational activities - Gardening    Narrative -  Engaged in structured, meaningful activity. Client reports reconnecting with sober support and sponsor. Client reports full time employment at job he is excited about.    Justification for Continued Treatment at this Level of Care:  Poor recognition and understanding of relapse and recidivism issues and displays some vulnerability for further substance use or mental health problems. Has few coping skills, rarely applied.  Discharge Planning:  Target Discharge Date/Timeframe:  21   Med Mgmt Provider/Appt:     Ind therapy Provider/Appt:     Family therapy Provider/Appt:          Has vulnerable adult status change? No    Service Coordination:  NA    Supervision:  NA    Are Treatment Plan goals/objectives effective? Yes  *If no, list changes to treatment plan:    Are the current goals meeting client's needs? Yes  *If no, list the changes to treatment plan.    Client Input / Response: NA      *Client agrees with any changes to the treatment plan: N/A  *Client received copy of changes: Yes  *Client is aware of right to access a treatment plan review: Yes

## 2021-05-10 ENCOUNTER — HOSPITAL ENCOUNTER (OUTPATIENT)
Dept: BEHAVIORAL HEALTH | Facility: CLINIC | Age: 56
End: 2021-05-10
Attending: FAMILY MEDICINE
Payer: COMMERCIAL

## 2021-05-10 PROCEDURE — H2035 A/D TX PROGRAM, PER HOUR: HCPCS | Mod: HQ,GT

## 2021-05-10 NOTE — GROUP NOTE
Group Therapy Documentation    PATIENT'S NAME: Monty Cox  MRN:   [id number]  :   1965  ACCT. NUMBER: [id number]  DATE OF SERVICE: 5/10/21  START TIME:  5:30 PM  END TIME:  7:30 PM  FACILITATOR(S): Hermelindo Mann LADC  TOPIC: BEH Group Therapy  Number of patients attending the group:  11  Group Length:  2 Hours    Group Therapy Type: Life skill(s)    Summary of Group / Topics Discussed:    Frustration and Distress Tolerance: Clients participated in introductions where client shared followed by a brief meditation on gratitude and a reading from the book 24 Hours. A short discussion followed each where clients had the opportunity to share thoughts. Client read in group a piece on the topic ?eveloping frustration tolerance  and processed how address the oversized / extreme reactions to normal stressors and inconveniences of everyday life caused by drugs and alcohol addiction and, in small groups identified issues in their lives that cause frustration. Clients explored solutions that can be used to   build Frustration Tolerance. Clients learned how it is necessary to change their thinking and work through challenges and problems. This relates to Clts Dim 3 and 5 Tx Plan Goals.      Telemedicine Visit: The patient's condition can be safely assessed and treated via synchronous audio and visual telemedicine encounter.      Reason for Telemedicine Visit: Services only offered telehealth    Originating Site (Patient Location): Patient's home    Distant Site (Provider Location): Provider Remote Setting    Consent:  The patient/guardian has verbally consented to: the potential risks and benefits of telemedicine (video visit) versus in person care; bill my insurance or make self-payment for services provided; and responsibility for payment of non-covered services.     Mode of Communication:  Video Conference via [employer]    As the provider I attest to compliance with applicable laws and regulations related to  telemedicine.        Group Attendance:  Attended group session    Patient's response to the group topic/interactions:  cooperative with task    Patient appeared to be Attentive.        Client specific details:  Client shared he has experienced frustrations with perception of being mis treated. He shared that he wants to get better at increasing his awareness so that he can respond rather than react.

## 2021-05-11 ENCOUNTER — HOSPITAL ENCOUNTER (OUTPATIENT)
Dept: BEHAVIORAL HEALTH | Facility: CLINIC | Age: 56
End: 2021-05-11
Attending: FAMILY MEDICINE
Payer: COMMERCIAL

## 2021-05-11 PROCEDURE — H2035 A/D TX PROGRAM, PER HOUR: HCPCS | Mod: HQ,95 | Performed by: SOCIAL WORKER

## 2021-05-12 ENCOUNTER — HOSPITAL ENCOUNTER (OUTPATIENT)
Dept: BEHAVIORAL HEALTH | Facility: CLINIC | Age: 56
End: 2021-05-12
Attending: FAMILY MEDICINE
Payer: COMMERCIAL

## 2021-05-12 PROCEDURE — H2035 A/D TX PROGRAM, PER HOUR: HCPCS | Mod: HQ,95

## 2021-05-12 NOTE — GROUP NOTE
Group Therapy Documentation    PATIENT'S NAME: Monty Cox  MRN:   [id number]  :   1965  ACCT. NUMBER: [id number]  DATE OF SERVICE: 21  START TIME:  5:30 PM  END TIME:  7:30 PM  FACILITATOR(S): Hermelindo Mann LADC  TOPIC: BEH Group Therapy  Number of patients attending the group:  7  Group Length:  2 Hours    Group Therapy Type: Addiction    Summary of Group / Topics Discussed:    Coping Skills/Lifestyle Managemet, Choices in Recovery, and Distress Tolerance  Finished review of coping skills to build distress tolerance. Recovery Planning and Goal Setting   D5 and D6, Clients discussed in group what recovery might look like and their readiness for recovery and the 1st step and what recovery means to them.  Strong emphasis placed on sober support attendance, developing sober connections.    Telemedicine Visit: The patient's condition can be safely assessed and treated via synchronous audio and visual telemedicine encounter.      Reason for Telemedicine Visit: Services only offered telehealth    Originating Site (Patient Location): Patient's home    Distant Site (Provider Location): Provider Remote Setting    Consent:  The patient/guardian has verbally consented to: the potential risks and benefits of telemedicine (video visit) versus in person care; bill my insurance or make self-payment for services provided; and responsibility for payment of non-covered services.     Mode of Communication:  Video Conference via [employer]    As the provider I attest to compliance with applicable laws and regulations related to telemedicine.        Group Attendance:  Attended group session    Patient's response to the group topic/interactions:  cooperative with task    Patient appeared to be Engaged.        Client specific details:  Clt had a reading he wanted to share in group because it tied in with the topic of the obsession or the fight we have with our DOC..

## 2021-05-12 NOTE — GROUP NOTE
Group Therapy Documentation    PATIENT'S NAME: Monty Cox  MRN:   [id number]  :   1965  ACCT. NUMBER: [id number]  DATE OF SERVICE: 21  START TIME:  5:30 PM  END TIME:  8:30 PM  FACILITATOR(S): Kayce Dahl LICSW  TOPIC: BEH Group Therapy  Number of patients attending the group:  9  Group Length:  3 Hours    Group Therapy Type: Addiction    Summary of Group / Topics Discussed:    Psychoeducation/Skills How Neurobiology Affects Behavior (IOP)  This topic will give a general overview of the neurobiology of addiction with the purpose of teaching new brain-based coping strategies to reduce the impact of their cravings, urges and distorted memories.  The clients will learn how the midbrain uses memory and associations to create cravings and how to counteract these.     Objective(s):    Clients will identify 3 ways to calm their overactive midbrain and strengthen their frontal cortex to lessen the impact of cravings and urges for addictive substances.      Clients will identify the underlying mechanisms at work to help them detach from the thoughts and emotions that trigger using.     Describe the reward pathway involved in addiction    Identify 1 skill  to counteract cravings and urges to use substances    Structure (modalities, homework, worksheets, etc.):    History of the study of addiction as a disease (PowerPoint)    Psychoeducation on the areas of the midbrain involved in addiction and how the various parts of the brain communicate with each other (PowerPoint)    Psychoeducation and discussion on how the escalation of addiction manifests in personal behaviors leading to negative consequences     Hands on practice of evidenced based strategies to calm the midbrain     Hands on practice of evidenced based strategies to strengthen the frontal cortex    Psychoeducation of anatomy of cravings, urges and addictive thinking and how to counteract this pattern     Show video clip of cross-addictions &  "process addictions    Expected therapeutic outcome(s):     A reduction in shame and guilt due to understanding how addiction influences behavior.    Reduction of use episodes due to using skills to reduce cravings.    Therapeutic outcome(s) measured by:   Teachback and group review and evaluation form  Telemedicine Visit: The patient's condition can be safely assessed and treated via synchronous audio and visual telemedicine encounter.      Reason for Telemedicine Visit: Services only offered telehealth     Originating Site (Patient Location): Patient's home     Distant Site (Provider Location): Provider Remote Setting     Consent:  The patient/guardian has verbally consented to: the potential risks and benefits of telemedicine (video visit) versus in person care; bill my insurance or make self-payment for services provided; and responsibility for payment of non-covered services.      Mode of Communication:  Video Conference via [employer]     As the provider I attest to compliance with applicable laws and regulations related to telemedicine.     Group Attendance:  Attended group session    Patient's response to the group topic/interactions:  discussed personal experience with topic, expressed readiness to alter behaviors and expressed understanding of topic    Patient appeared to be Actively participating.        Client specific details:  Monty shared that he had a busy weekend that was filled with social activities. He continues to do mindfulness during the week and his stress is at 2-3 and he reported no cravings. Monty's feedback on this topic today included \"most anything can be an addiction, it's all about how the body responds to the rush of dopamine.\"  This topic completes all six MH Skills Groups required under D3 of his Treatment Plan.       "

## 2021-05-13 ENCOUNTER — HOSPITAL ENCOUNTER (OUTPATIENT)
Dept: BEHAVIORAL HEALTH | Facility: CLINIC | Age: 56
End: 2021-05-13
Attending: FAMILY MEDICINE
Payer: COMMERCIAL

## 2021-05-13 PROCEDURE — H2035 A/D TX PROGRAM, PER HOUR: HCPCS | Mod: HQ,GT

## 2021-05-13 NOTE — GROUP NOTE
Group Therapy Documentation    PATIENT'S NAME: Monty Cox  MRN:   [id number]  :   1965  [id number]  DATE OF SERVICE: 21  START TIME:  5:30 PM  END TIME:  7:30 PM  FACILITATOR(S): Hermelindo Mann LADC  TOPIC: BEH Group Therapy  Number of patients attending the group:  7  Group Length:  2 Hours    Group Therapy Type: Addiction and Life skill(s)    Summary of Group / Topics Discussed:    Coping Skills/Lifestyle Managemet and Choices in Recovery, Cont'd discussion of Step One  Clients in group discussion, self assessed themselves on their level of embracing concepts of (HOW) Honesty dealing with addiction, Openness with their feelings and Willingness to take the steps necessary for recovery. Clients processed whether they were powerless over their DOC and ways they could evaluate 'powerlessness'. Relates to clt D3 and D5 tx plan goals.    Telemedicine Visit: The patient's condition can be safely assessed and treated via synchronous audio and visual telemedicine encounter.      Reason for Telemedicine Visit: Services only offered telehealth    Originating Site (Patient Location): Patient's home    Distant Site (Provider Location): [facility] Outpatient Setting:      Consent:  The patient/guardian has verbally consented to: the potential risks and benefits of telemedicine (video visit) versus in person care; bill my insurance or make self-payment for services provided; and responsibility for payment of non-covered services.     Mode of Communication:  Video Conference via [employer]    As the provider I attest to compliance with applicable laws and regulations related to telemedicine.        Group Attendance:  Attended group session    Patient's response to the group topic/interactions:  cooperative with task    Patient appeared to be Attentive.        Client specific details:  Clt checked in and reported no change in sober date. He said this weekend he had plans to go for a bike  "ride, plant a tree and meet with his sponsor. He said his self talk is \"hopefull\"..  "

## 2021-05-16 ENCOUNTER — HEALTH MAINTENANCE LETTER (OUTPATIENT)
Age: 56
End: 2021-05-16

## 2021-05-17 ENCOUNTER — HOSPITAL ENCOUNTER (OUTPATIENT)
Dept: BEHAVIORAL HEALTH | Facility: CLINIC | Age: 56
End: 2021-05-17
Attending: FAMILY MEDICINE
Payer: COMMERCIAL

## 2021-05-17 PROCEDURE — H2035 A/D TX PROGRAM, PER HOUR: HCPCS | Mod: HQ,95

## 2021-05-17 NOTE — GROUP NOTE
Group Therapy Documentation    PATIENT'S NAME: Monty Cox  MRN:   [id number]  :   1965  ACCT. NUMBER: [id number]  DATE OF SERVICE: 21  START TIME:  5:30 PM  END TIME:  7:30 PM  FACILITATOR(S): Hermelindo Mann LADC  TOPIC: BEH Group Therapy  Number of patients attending the group:  7  Group Length:  2 Hours    Group Therapy Type: Addiction    Summary of Group / Topics Discussed:    Choices in Recovery, Clients in group discussion, self assessed themselves on their level of embracing concepts of (HOW) Honesty dealing with addiction, Openness with their feelings and Willingness to take the steps necessary for recovery. Client participated in group discussion of Personal Values Exploration/ Promises of Recovery. This relates to clients Dim 3 and 5 treatment plan goals.      Telemedicine Visit: The patient's condition can be safely assessed and treated via synchronous audio and visual telemedicine encounter.      Reason for Telemedicine Visit: Services only offered telehealth    Originating Site (Patient Location): Patient's home    Distant Site (Provider Location): Provider Remote Setting    Consent:  The patient/guardian has verbally consented to: the potential risks and benefits of telemedicine (video visit) versus in person care; bill my insurance or make self-payment for services provided; and responsibility for payment of non-covered services.     Mode of Communication:  Video Conference via [employer]    As the provider I attest to compliance with applicable laws and regulations related to telemedicine.        Group Attendance:  Attended group session    Patient's response to the group topic/interactions:  cooperative with task    Patient appeared to be Attentive.        Client specific details:  Clt checked in reporting no change in sobriety date and said he was feeling tired tonight. He shared that he didn't do any of the recovery actions he had planned because he just got busy. Client said  "his motivation for recovery is to be healthy. He said he needs to put more effort into spending the \"currency of recovery\" which is being able to have fun again.  "

## 2021-05-19 ENCOUNTER — HOSPITAL ENCOUNTER (OUTPATIENT)
Dept: BEHAVIORAL HEALTH | Facility: CLINIC | Age: 56
End: 2021-05-19
Attending: FAMILY MEDICINE
Payer: COMMERCIAL

## 2021-05-19 PROCEDURE — H2035 A/D TX PROGRAM, PER HOUR: HCPCS | Mod: HQ,GT

## 2021-05-19 NOTE — GROUP NOTE
Group Therapy Documentation    PATIENT'S NAME: Monty Cox  MRN:   [id number]  :   1965  ACCT. NUMBER: [id number]  DATE OF SERVICE: 21  START TIME:  5:30 PM  END TIME:  7:30 PM  FACILITATOR(S): Hermelindo Mann LADC  TOPIC: BEH Group Therapy  Number of patients attending the group:  6  Group Length:  2 Hours    Group Therapy Type: Addiction    Summary of Group / Topics Discussed:    Coping Skills/Lifestyle Managemet, Clients participated in introductions followed by a brief meditation on gratitude and a reading from the book 24 Hours. A short discussion followed each where clients had the opportunity to share thoughts. Client watched a portion of Happiness video and Identified    as something they could do to support their recovery and specifically to manage negative thought. This relates to DIM 3 t goal of Learn how to apply self-care and psychotherapy to build a repertoire of daily habits that increased resiliency and well-being.    Telemedicine Visit: The patient's condition can be safely assessed and treated via synchronous audio and visual telemedicine encounter.      Reason for Telemedicine Visit: Services only offered telehealth    Originating Site (Patient Location): Patient's home    Distant Site (Provider Location): Provider Remote Setting    Consent:  The patient/guardian has verbally consented to: the potential risks and benefits of telemedicine (video visit) versus in person care; bill my insurance or make self-payment for services provided; and responsibility for payment of non-covered services.     Mode of Communication:  Video Conference via Tactiga    As the provider I attest to compliance with applicable laws and regulations related to telemedicine.        Group Attendance:  Attended group session    Patient's response to the group topic/interactions:  cooperative with task    Patient appeared to be Attentive.        Client specific details:  Clt checked in reporting no change  "in sobriety date. Clt shared anxiety/depression on scale of 1-10 being  .7 ad 3 respectively Clt shared coping skill employed was asking for help .Clt shared a personal strength of patience. Client share his anxiety was related to his doctor not allowing him to reduce his frequency of dialysis form daily. His wife called [facility] [place] to get a 2nd a opinion based on another review of his \"numbers\" and found out they are all connected when the original Dr called his wife back negating his ability to get a \"second opinion\".  "

## 2021-05-19 NOTE — PROGRESS NOTES
[facility] Weekly Treatment Plan Review      ATTENDANCE for the following date span:  Monday 5/10/21 through 21.      Day     Group Therapy 2.0 hours 3 hours 2.0 hours 2.0 hours   Individual Therapy       Family Therapy       Other (Specify)           Patient did not have any absences during this time period. Client to transitioned to phase 2 on 21 due to having completed all 6 MH skills groups.      Weekly Treatment Plan Review     Treatment Plan initiated on: 21.    Dimension1: Acute Intoxication/Withdrawal Potential -   Previous Dimension Ratin  Current Dimension Ratin  Date of Last Use 21  Any reports of withdrawal symptoms - No        Dimension 2: Biomedical Conditions & Complications -   Previous Dimension Ratin  Current Dimension Ratin  Medical Concerns:    Current Medications & Medication Changes:  Current Outpatient Medications   Medication     ACCU-CHEK GUIDE test strip     acetaminophen (TYLENOL) 325 MG tablet     allopurinol (ZYLOPRIM) 100 MG tablet     allopurinol (ZYLOPRIM) 100 MG tablet     blood glucose (ACCU-CHEK GUIDE) test strip     blood glucose monitoring (ACCU-CHEK ERIKA PLUS) meter device kit     blood glucose monitoring (SOFTCLIX) lancets     blood glucose monitoring (SOFTCLIX) lancets     Blood Glucose Monitoring Suppl (ACCU-CHEK GUIDE) w/Device KIT     buPROPion (WELLBUTRIN XL) 150 MG 24 hr tablet     calcium carbonate 500 mg, elemental, (OSCAL 500) 1250 (500 Ca) MG TABS tablet     darbepoetin veronika (ARANESP) 40 MCG/ML injection     ergocalciferol (ERGOCALCIFEROL) 1.25 MG (65118 UT) capsule     furosemide (LASIX) 80 MG tablet     furosemide (LASIX) 80 MG tablet     gabapentin (NEURONTIN) 100 MG capsule     gabapentin (NEURONTIN) 100 MG capsule     lactase (LACTAID) 3000 UNIT tablet     multivitamin RENAL (NEPHROCAPS/TRIPHROCAPS) 1 MG capsule     No current facility-administered medications for this  "encounter.      Medication Prescriber:  [facility] [place]  Taking meds as prescribed? Yes  Medication side effects or concerns:  NA  Outside medical appointments this week (list provider and reason for visit):  Dialysis        Dimension 3: Emotional/Behavioral Conditions & Complications -   Previous Dimension Ratin  Current Dimension Ratin  PHQ9     PHQ-9 SCORE 3/22/2021   PHQ-9 Total Score 9     GAD7     PILI-7 SCORE 3/22/2021   Total Score 10     Mental health diagnosis Patient reported the following previous mental health diagnoses: \"depression and anxiety\".    Date of last SIB:  NA  Date of  last SI:  NA  Date of last HI: NA  Behavioral Targets:  increase impulse control and  coping skills.   Current MH Assignments:  Mindfulness and 478 Breathing    Narrative:     Difficulty with impulse control and lacks coping skills. Thoughts of suicide or harm to others without plan or means; however, the thoughts may interfere with participation in some Tx activities. Difficulty functioning in significant life areas. Moderate symptoms of emotional, behavioral, or cognitive problems. Able to participate in most Tx activities. Clt reports growing fear/sadness of civil unrest and potential for violence in his neighborhood.         Dimension 4: Treatment Acceptance / Resistance -   Previous Dimension Ratin  Current Dimension Ratin  ALEXA Diagnosis:  Alcohol Use Disorder   303.90 (F10.20) Severe   Stage - 2  Commitment to tx process/Stage of change- Prep  ALEXA assignments - 10 Worst Consequences    Narrative -    Displays verbal compliance, but lacks consistent behaviors; has low motivation for change; is passively involved in Tx. Is reconnecting with sponsor and sober support.       Dimension 5: Relapse / Continued Problem Potential -   Previous Dimension Rating: 3  Current Dimension Ratin  Relapses this week - None  Urges to use - None  UA results - None taken this week  Narrative- Poor recognition and understanding of " relapse and recidivism issues and displays some vulnerability for further substance use or mental health problems. Has few coping skills, rarely applied.Client reports reconnecting with sober support and sponsor.Clt reports desire to exercise for self care.      Dmension 6: Recovery Environment -   Previous Dimension Rating:  3  Current Dimension Ratin  Family Involvement -   Summarize attendance at family groups and family sessions - None  Family supportive of treatment?  Client reports family is supportive    Community support group attendance - None  Recreational activities - Gardening    Narrative -  Engaged in structured, meaningful activity. Client reports reconnecting with sober support and sponsor. Client reports full time employment at job he is excited about.    Justification for Continued Treatment at this Level of Care:  Poor recognition and understanding of relapse and recidivism issues and displays some vulnerability for further substance use or mental health problems. Has few coping skills, rarely applied.  Discharge Planning:  Target Discharge Date/Timeframe:  21   Med Mgmt Provider/Appt:     Ind therapy Provider/Appt:     Family therapy Provider/Appt:          Has vulnerable adult status change? No    Service Coordination:  NA    Supervision:  NA    Are Treatment Plan goals/objectives effective? Yes  *If no, list changes to treatment plan:    Are the current goals meeting client's needs? Yes  *If no, list the changes to treatment plan.    Client Input / Response: NA      *Client agrees with any changes to the treatment plan: N/A  *Client received copy of changes: Yes  *Client is aware of right to access a treatment plan review: Yes

## 2021-05-20 ENCOUNTER — HOSPITAL ENCOUNTER (OUTPATIENT)
Dept: BEHAVIORAL HEALTH | Facility: CLINIC | Age: 56
End: 2021-05-20
Attending: FAMILY MEDICINE
Payer: COMMERCIAL

## 2021-05-20 PROCEDURE — H2035 A/D TX PROGRAM, PER HOUR: HCPCS | Mod: HQ,95

## 2021-05-20 NOTE — PROGRESS NOTES
[facility] Weekly Treatment Plan Review      ATTENDANCE for the following date span:  21 through 21    Day     Group Therapy 2.0 hours 2.0 hours 2.0 hours   Individual Therapy      Family Therapy      Other (Specify)          Patient did not have any absences during this time period. Client to transitioned to phase 2 on 21 due to having completed all 6 MH skills groups.      Weekly Treatment Plan Review     Treatment Plan initiated on: 21.    Dimension1: Acute Intoxication/Withdrawal Potential -   Previous Dimension Ratin  Current Dimension Ratin  Date of Last Use 21  Any reports of withdrawal symptoms - No        Dimension 2: Biomedical Conditions & Complications -   Previous Dimension Ratin  Current Dimension Ratin  Medical Concerns:    Current Medications & Medication Changes:  Current Outpatient Medications   Medication     ACCU-CHEK GUIDE test strip     acetaminophen (TYLENOL) 325 MG tablet     allopurinol (ZYLOPRIM) 100 MG tablet     allopurinol (ZYLOPRIM) 100 MG tablet     blood glucose (ACCU-CHEK GUIDE) test strip     blood glucose monitoring (ACCU-CHEK ERIKA PLUS) meter device kit     blood glucose monitoring (SOFTCLIX) lancets     blood glucose monitoring (SOFTCLIX) lancets     Blood Glucose Monitoring Suppl (ACCU-CHEK GUIDE) w/Device KIT     buPROPion (WELLBUTRIN XL) 150 MG 24 hr tablet     calcium carbonate 500 mg, elemental, (OSCAL 500) 1250 (500 Ca) MG TABS tablet     darbepoetin veronika (ARANESP) 40 MCG/ML injection     ergocalciferol (ERGOCALCIFEROL) 1.25 MG (03424 UT) capsule     furosemide (LASIX) 80 MG tablet     furosemide (LASIX) 80 MG tablet     gabapentin (NEURONTIN) 100 MG capsule     gabapentin (NEURONTIN) 100 MG capsule     lactase (LACTAID) 3000 UNIT tablet     multivitamin RENAL (NEPHROCAPS/TRIPHROCAPS) 1 MG capsule     No current facility-administered medications for this encounter.      Medication  "[place]  [facility] [place]  Taking meds as prescribed? Yes  Medication side effects or concerns:  NA  Outside medical appointments this week (list provider and reason for visit):  Dialysis        Dimension 3: Emotional/Behavioral Conditions & Complications -   Previous Dimension Ratin  Current Dimension Ratin  PHQ9     PHQ-9 SCORE 3/22/2021   PHQ-9 Total Score 9     GAD7     PILI-7 SCORE 3/22/2021   Total Score 10     Mental health diagnosis Patient reported the following previous mental health diagnoses: \"depression and anxiety\".    Date of last SIB:  NA  Date of  last SI:  NA  Date of last HI: NA  Behavioral Targets:  increase impulse control and  coping skills.   Current MH Assignments:  Mindfulness and 478 Breathing    Narrative:     Difficulty with impulse control and lacks coping skills. Thoughts of suicide or harm to others without plan or means; however, the thoughts may interfere with participation in some Tx activities. Difficulty functioning in significant life areas. Moderate symptoms of emotional, behavioral, or cognitive problems. Able to participate in most Tx activities. Clt reports anxiety over ongoing dialysis.         Dimension 4: Treatment Acceptance / Resistance -   Previous Dimension Ratin  Current Dimension Ratin  ALEXA Diagnosis:  Alcohol Use Disorder   303.90 (F10.20) Severe   Stage - 2  Commitment to tx process/Stage of change- Prep  ALEXA assignments - 10 Worst Consequences    Narrative -    Displays verbal compliance, but lacks consistent behaviors; has low motivation for change; is passively involved in Tx. Reprts he was reconnecting with sponsor and sober support. But not attending mtgs as yet as they are still suspended for COVID.       Dimension 5: Relapse / Continued Problem Potential -   Previous Dimension Rating: 3  Current Dimension Ratin  Relapses this week - None  Urges to use - None  UA results - None taken this week  Narrative- Poor recognition and understanding of " relapse and recidivism issues and displays some vulnerability for further substance use or mental health problems. Has few coping skills, rarely applied.Client reports reconnecting with sober support and sponsor.Clt reports desire to exercise for self care.      Dmension 6: Recovery Environment -   Previous Dimension Rating:  3  Current Dimension Ratin  Family Involvement -   Summarize attendance at family groups and family sessions - None  Family supportive of treatment?  Client reports family is supportive    Community support group attendance - None  Recreational activities - Gardening and walking  Narrative -  Engaged in structured, meaningful activity. Client reports reconnecting with sober support and sponsor. Client reports full time employment at job he is excited about.    Justification for Continued Treatment at this Level of Care:  Poor recognition and understanding of relapse and recidivism issues and displays some vulnerability for further substance use or mental health problems. Has few coping skills, rarely applied.  Discharge Planning:  Target Discharge Date/Timeframe:  21   Med Mgmt Provider/Appt:     Ind therapy Provider/Appt:     Family therapy Provider/Appt:          Has vulnerable adult status change? No    Service Coordination:  NA    Supervision:  NA    Are Treatment Plan goals/objectives effective? Yes  *If no, list changes to treatment plan:    Are the current goals meeting client's needs? Yes  *If no, list the changes to treatment plan.    Client Input / Response: NA      *Client agrees with any changes to the treatment plan: N/A  *Client received copy of changes: Yes  *Client is aware of right to access a treatment plan review: Yes

## 2021-05-20 NOTE — GROUP NOTE
Group Therapy Documentation    PATIENT'S NAME: Monty Cox  MRN:   [id number]  :   1965  ACCT. NUMBER: [id number]  DATE OF SERVICE: 21  START TIME:  5:30 PM  END TIME:  7:30 PM  FACILITATOR(S): Hermelindo Mann LADC  TOPIC: BEH Group Therapy  Number of patients attending the group:  6  Group Length:  2 Hours    Group Therapy Type: Life skill(s)    Summary of Group / Topics Discussed:    Emotional Regulation: Client participated in weekly check-in process with group and identified 3 reasons to stay sober. Group participated in brief mediation followed by reading and discussion from [employer]. Clients explored the topic of emotional regulation and specifically fear and anger in early recovery as triggering threats and strategies/Reponses to counter threats. And watched a short video  titled ?nder all anger is Fear . Client learned to view emotions situations as opportunity to improve skills of defusion and mindfulness.    Telemedicine Visit: The patient's condition can be safely assessed and treated via synchronous audio and visual telemedicine encounter.      Reason for Telemedicine Visit: Services only offered telehealth    Originating Site (Patient Location): Patient's home    Distant Site (Provider Location): Provider Remote Setting    Consent:  The patient/guardian has verbally consented to: the potential risks and benefits of telemedicine (video visit) versus in person care; bill my insurance or make self-payment for services provided; and responsibility for payment of non-covered services.     Mode of Communication:  Video Conference via S[employer]Tran    As the provider I attest to compliance with applicable laws and regulations related to telemedicine.       Group Attendance:  Attended group session    Patient's response to the group topic/interactions:  cooperative with task    Patient appeared to be Engaged.        Client specific details: Client participated in weekly check-in process with  "group and identified 3 reasons to stay sober. Group participated in brief mediation followed by reading and discussion from Daily Reflections. Client participated in group discussion about the saying \"If something is going to change, something has to change. Whats changing? \" Clt shared that whats changing for him is his awareness of his emotions especially fear and being able to questions his emotions in situations.. Clt shared he palns to support his recovery this weekend by Going to  An AA mtg and talking with his sponsor and exercise.  "

## 2021-05-24 ENCOUNTER — HOSPITAL ENCOUNTER (OUTPATIENT)
Dept: BEHAVIORAL HEALTH | Facility: CLINIC | Age: 56
End: 2021-05-24
Attending: FAMILY MEDICINE
Payer: COMMERCIAL

## 2021-05-24 PROCEDURE — H2035 A/D TX PROGRAM, PER HOUR: HCPCS | Mod: HQ,GT

## 2021-05-24 NOTE — GROUP NOTE
"Group Therapy Documentation    PATIENT'S NAME: Monty Cox  MRN:   [id number]  :   1965  ACCT. NUMBER: [id number]  DATE OF SERVICE: 21  START TIME:  5:30 PM  END TIME:  7:30 PM  FACILITATOR(S): Hermelindo Mann LADC  TOPIC: BEH Group Therapy  Number of patients attending the group:  6  Group Length:  2 Hours    Group Therapy Type: Addiction    Summary of Group / Topics Discussed:    Psychoeducation/Skills Readiness to change (stages of change) [ALEXA]  This topic will give a general overview of stage of change models and how they apply to the personal experience of each patient.    Objective(s):    Patient will identify at least 2 stage of change models.    Patient will identify at least 5 stages within the Prochaska-DiClemente model.    Patient will identify their own position within the model(s).    Structure:    Provide psychoeducation on readiness to change and stages of change.    Facilitate group discussion around each patient s reasons to change and current place in the model(s).    Use teach-back techniques to ensure patients  understanding.    Provide patients with handouts to enhance learning.    Expected therapeutic outcomes:      Understand change as an essential and non-linear process.    Reduce confusion about ambivalence.    Enhanced motivation to change.    Ability to maintain/return to sobriety    Therapeutic outcome(s) measured by:    Patient s ability to teach-back information learned in group.    Patient s demonstration of learning by identification of their own stage of change      Group Attendance:  Attended group session    Patient's response to the group topic/interactions:  cooperative with task    Patient appeared to be Engaged.        Client specific details:  Client participated in the check-in process and reported no change in sobriety date.  Client reports feeling accomplished today. Client presented his \"10 consequences of my use\" assignment in group. Client reviewed and " "taught back elements of Stages of Change relating to his Dimension 4 goal of increasing internal motivation to change.  Client expressed he thought he was currently in the action or maintenance stage of the DiClemente-Prochaska model as he is attending sober support, self-care, working with a sponsor and attending OP treatment..  Client actively participated in a \"[place]  meditation/breathing exercise and expressed it was a helpful.  Plan:  Client will identify at least one action they can take to further their recovery..  "

## 2021-05-26 ENCOUNTER — HOSPITAL ENCOUNTER (OUTPATIENT)
Dept: BEHAVIORAL HEALTH | Facility: CLINIC | Age: 56
End: 2021-05-26
Attending: FAMILY MEDICINE
Payer: COMMERCIAL

## 2021-05-26 PROCEDURE — H2035 A/D TX PROGRAM, PER HOUR: HCPCS | Mod: HQ,GT

## 2021-05-26 NOTE — GROUP NOTE
Group Therapy Documentation    PATIENT'S NAME: Monty Cox  MRN:   [id number]  :   1965  ACCT. NUMBER: [id number]  DATE OF SERVICE: 21  START TIME:  5:30 PM  END TIME:  7:30 PM  FACILITATOR(S): Hermelindo Mann LADC  TOPIC: BEH Group Therapy  Number of patients attending the group:  7  Group Length:  2 Hours    Group Therapy Type: Addiction    Summary of Group / Topics Discussed:    Importance of Sober Connections in recovery  and fellowship.    Telemedicine Visit: The patient's condition can be safely assessed and treated via synchronous audio and visual telemedicine encounter.      Reason for Telemedicine Visit: Services only offered telehealth    Originating Site (Patient Location): Patient's home    Distant Site (Provider Location): Provider Remote Setting    Consent:  The patient/guardian has verbally consented to: the potential risks and benefits of telemedicine (video visit) versus in person care; bill my insurance or make self-payment for services provided; and responsibility for payment of non-covered services.     Mode of Communication:  Video Conference via MyRealTrip    As the provider I attest to compliance with applicable laws and regulations related to telemedicine.       Group Attendance:  Attended group session    Patient's response to the group topic/interactions:  cooperative with task    Patient appeared to be Engaged.        Client specific details:  Clt checked saying his self talk has been increasingly positive.He shared his motivation for recovery is at a 9 (10 being highest) and his anxiety and depression levels were both lower at a 4 for each.. One thing that seems to be helping him is being able to get outside and work in his garden.  Clt shared with peers why he thought sober connections were important in recovery. He shared with group a picture of himself in the ICU hospital last January for alcohol withdrawal. He was in an induced coma for almost 2 weeks. His sober  connections from AA were there to provide his wife with the support she needed to get through it.

## 2021-05-27 ENCOUNTER — HOSPITAL ENCOUNTER (OUTPATIENT)
Dept: BEHAVIORAL HEALTH | Facility: CLINIC | Age: 56
End: 2021-05-27
Attending: FAMILY MEDICINE
Payer: COMMERCIAL

## 2021-05-27 PROCEDURE — H2035 A/D TX PROGRAM, PER HOUR: HCPCS | Mod: HQ,GT

## 2021-05-27 NOTE — GROUP NOTE
Group Therapy Documentation    PATIENT'S NAME: Monty Cox  MRN:   [id number]  :   1965  ACCT. NUMBER: [id number]  DATE OF SERVICE: 21  START TIME:  5:30 PM  END TIME:  7:30 PM  FACILITATOR(S): Hermelindo Mann LADC  TOPIC: BEH Group Therapy  Number of patients attending the group:  8  Group Length:  2 Hours    Group Therapy Type: Addiction    Summary of Group / Topics Discussed:    Thinking Errors/Negative Self-Talk, Addictive Thinking  Addictive Thinking was reviewed and discussed in group and the need to have coping skills in place to deal with it before it manifests in cravings/ thoughts of using.    Telemedicine Visit: The patient's condition can be safely assessed and treated via synchronous audio and visual telemedicine encounter.      Reason for Telemedicine Visit: Services only offered telehealth    Originating Site (Patient Location): Patient's home    Distant Site (Provider Location): Provider Remote Setting    Consent:  The patient/guardian has verbally consented to: the potential risks and benefits of telemedicine (video visit) versus in person care; bill my insurance or make self-payment for services provided; and responsibility for payment of non-covered services.     Mode of Communication:  Video Conference via [employer]    As the provider I attest to compliance with applicable laws and regulations related to telemedicine.             Group Attendance:  Attended group session    Patient's response to the group topic/interactions:  cooperative with task    Patient appeared to be Attentive.        Client specific details:  Client participated in weekly check-in process with group and identified 3 reasons to stay sober. Group participated in brief mediation followed by reading and discussion from Daily Reflections. Client reviewed and participated in group discussion topic of Addictive Thinking and ways to increase awareness of their addictive thinking/cognitive distortions as  critical to long term recovery important in recovery. Client shared with group how negative thoughts about not being as smart as others or being undeserving had impacted his use and fueled depression.

## 2021-05-27 NOTE — PROGRESS NOTES
[facility] Weekly Treatment Plan Review      ATTENDANCE for the following date span:  21 thru 21    Day     Group Therapy 2.0 hours 2.0 hours 2.0 hours   Individual Therapy      Family Therapy      Other (Specify)          Patient did not have any absences during this time period.     Weekly Treatment Plan Review     Treatment Plan initiated on: 21.    Dimension1: Acute Intoxication/Withdrawal Potential -   Previous Dimension Ratin  Current Dimension Ratin  Date of Last Use 21  Any reports of withdrawal symptoms - No        Dimension 2: Biomedical Conditions & Complications -   Previous Dimension Ratin  Current Dimension Ratin  Medical Concerns:    Current Medications & Medication Changes:  Current Outpatient Medications   Medication     ACCU-CHEK GUIDE test strip     acetaminophen (TYLENOL) 325 MG tablet     allopurinol (ZYLOPRIM) 100 MG tablet     allopurinol (ZYLOPRIM) 100 MG tablet     blood glucose (ACCU-CHEK GUIDE) test strip     blood glucose monitoring (ACCU-CHEK ERIKA PLUS) meter device kit     blood glucose monitoring (SOFTCLIX) lancets     blood glucose monitoring (SOFTCLIX) lancets     Blood Glucose Monitoring Suppl (ACCU-CHEK GUIDE) w/Device KIT     buPROPion (WELLBUTRIN XL) 150 MG 24 hr tablet     calcium carbonate 500 mg, elemental, (OSCAL 500) 1250 (500 Ca) MG TABS tablet     darbepoetin veronika (ARANESP) 40 MCG/ML injection     ergocalciferol (ERGOCALCIFEROL) 1.25 MG (47732 UT) capsule     furosemide (LASIX) 80 MG tablet     furosemide (LASIX) 80 MG tablet     gabapentin (NEURONTIN) 100 MG capsule     gabapentin (NEURONTIN) 100 MG capsule     lactase (LACTAID) 3000 UNIT tablet     multivitamin RENAL (NEPHROCAPS/TRIPHROCAPS) 1 MG capsule     No current facility-administered medications for this encounter.      Medication Prescriber:  [facility] [place]  Taking meds as prescribed? Yes  Medication side effects or concerns:   "NA  Outside medical appointments this week (list provider and reason for visit):  Dialysis        Dimension 3: Emotional/Behavioral Conditions & Complications -   Previous Dimension Ratin  Current Dimension Ratin  PHQ9     PHQ-9 SCORE 3/22/2021   PHQ-9 Total Score 9     GAD7     PILI-7 SCORE 3/22/2021   Total Score 10     Mental health diagnosis Patient reported the following previous mental health diagnoses: \"depression and anxiety\".    Date of last SIB:  NA  Date of  last SI:  NA  Date of last HI: NA  Behavioral Targets:  increase impulse control and  coping skills.   Current MH Assignments:  Mindfulness and 478 Breathing    Narrative:     Difficulty with impulse control and lacks coping skills.  Moderate symptoms of emotional, behavioral, or cognitive problems. Able to participate in most Tx activities. Clt reports anxiety over of 4 on scale of 1-10. This is lower than prev. Week due to clt ability to engage in mindfulness actions.     Dimension 4: Treatment Acceptance / Resistance -   Previous Dimension Ratin  Current Dimension Ratin  ALEXA Diagnosis:  Alcohol Use Disorder   303.90 (F10.20) Severe   Stage - 2  Commitment to tx process/Stage of change- Prep  ALEXA assignments - 10 Worst Consequences completed    Narrative -    Displays verbal compliance, but lacks consistent behaviors; has low motivation for change; is passively involved in Tx. Reprts he was reconnecting with sponsor and sober support. But not attending mtgs as yet as they are still suspended for COVID.       Dimension 5: Relapse / Continued Problem Potential -   Previous Dimension Rating: 3  Current Dimension Ratin  Relapses this week - None  Urges to use - None  UA results - None taken this week  Narrative- Poor recognition and understanding of relapse and recidivism issues and displays some vulnerability for further substance use or mental health problems. Has few coping skills, rarely applied.Client reports reconnecting " with sober support and sponsor.Clt reports desire to exercise for self care.      Dmension 6: Recovery Environment -   Previous Dimension Rating:  3  Current Dimension Ratin  Family Involvement -   Summarize attendance at family groups and family sessions - None  Family supportive of treatment?  Client reports family is supportive    Community support group attendance - None  Recreational activities - Gardening and walking  Narrative -  Engaged in structured, meaningful activity. Client reports reconnecting with sober support and sponsor. Client reports full time employment at job he is excited about.    Justification for Continued Treatment at this Level of Care:  Poor recognition and understanding of relapse and recidivism issues and displays some vulnerability for further substance use or mental health problems. Has few coping skills, rarely applied.  Discharge Planning:  Target Discharge Date/Timeframe:  21   Med Mgmt Provider/Appt:     Ind therapy Provider/Appt:     Family therapy Provider/Appt:          Has vulnerable adult status change? No    Service Coordination:  NA    Supervision:  NA    Are Treatment Plan goals/objectives effective? Yes  *If no, list changes to treatment plan:    Are the current goals meeting client's needs? Yes  *If no, list the changes to treatment plan.    Client Input / Response: NA      *Client agrees with any changes to the treatment plan: N/A  *Client received copy of changes: Yes  *Client is aware of right to access a treatment plan review: Yes

## 2021-06-02 ENCOUNTER — HOSPITAL ENCOUNTER (OUTPATIENT)
Dept: BEHAVIORAL HEALTH | Facility: CLINIC | Age: 56
End: 2021-06-02
Attending: FAMILY MEDICINE
Payer: COMMERCIAL

## 2021-06-02 PROCEDURE — H2035 A/D TX PROGRAM, PER HOUR: HCPCS | Mod: HQ,95

## 2021-06-02 NOTE — GROUP NOTE
Group Therapy Documentation    PATIENT'S NAME: Monty Cox  MRN:   [id number]  :   1965  ACCT. NUMBER: [id number]  DATE OF SERVICE: 21  START TIME:  5:30 PM  END TIME:  7:30 PM  FACILITATOR(S): Hermelindo Mann LADC  TOPIC: BEH Group Therapy  Number of patients attending the group:  6  Group Length:  2 Hours    Group Therapy Type: Addiction    Summary of Group / Topics Discussed:    Choices in Recovery. New group members spent some time getting to know each other. Group discussion centered around goals from tx. What is recovery? How does recovery happen? Whats the difference between recovery and sobriety? Why mindfulness exercise can help  Client participated in group discussion on benefits of Acceptance and Stages of Recovery. This is related to Dim 3 goal to help client gain understanding of addiction as a disease and how this disorder affects other aspects of mental and physical health.    Telemedicine Visit: The patient's condition can be safely assessed and treated via synchronous audio and visual telemedicine encounter.      Reason for Telemedicine Visit: Services only offered telehealth    Originating Site (Patient Location): Patient's home    Distant Site (Provider Location): Provider Remote Setting    Consent:  The patient/guardian has verbally consented to: the potential risks and benefits of telemedicine (video visit) versus in person care; bill my insurance or make self-payment for services provided; and responsibility for payment of non-covered services.     Mode of Communication:  Video Conference via [employer]    As the provider I attest to compliance with applicable laws and regulations related to telemedicine.       Group Attendance:  Attended group session    Patient's response to the group topic/interactions:  cooperative with task    Patient appeared to be Engaged.        Client specific details:  Client shared that he has learned that for him recovery is requiring he be able  to change the way he thinks and that for him has required a lot of vulnerability.

## 2021-06-03 ENCOUNTER — HOSPITAL ENCOUNTER (OUTPATIENT)
Dept: BEHAVIORAL HEALTH | Facility: CLINIC | Age: 56
End: 2021-06-03
Attending: FAMILY MEDICINE
Payer: COMMERCIAL

## 2021-06-03 PROCEDURE — H2035 A/D TX PROGRAM, PER HOUR: HCPCS | Mod: HQ,95

## 2021-06-03 NOTE — PROGRESS NOTES
[facility] Weekly Treatment Plan Review      ATTENDANCE for the following date span:  , 21 thru 21    Day     Group Therapy 2.0 hours 2.0 hours 2.0 hours   Individual Therapy      Family Therapy      Other (Specify)          Patient did not have any absences during this time period.     Weekly Treatment Plan Review     Treatment Plan initiated on: 21.    Dimension1: Acute Intoxication/Withdrawal Potential -   Previous Dimension Ratin  Current Dimension Ratin  Date of Last Use 21  Any reports of withdrawal symptoms - No        Dimension 2: Biomedical Conditions & Complications -   Previous Dimension Ratin  Current Dimension Ratin  Medical Concerns:    Current Medications & Medication Changes:  Current Outpatient Medications   Medication     ACCU-CHEK GUIDE test strip     acetaminophen (TYLENOL) 325 MG tablet     allopurinol (ZYLOPRIM) 100 MG tablet     allopurinol (ZYLOPRIM) 100 MG tablet     blood glucose (ACCU-CHEK GUIDE) test strip     blood glucose monitoring (ACCU-CHEK ERIKA PLUS) meter device kit     blood glucose monitoring (SOFTCLIX) lancets     blood glucose monitoring (SOFTCLIX) lancets     Blood Glucose Monitoring Suppl (ACCU-CHEK GUIDE) w/Device KIT     buPROPion (WELLBUTRIN XL) 150 MG 24 hr tablet     calcium carbonate 500 mg, elemental, (OSCAL 500) 1250 (500 Ca) MG TABS tablet     darbepoetin veronika (ARANESP) 40 MCG/ML injection     ergocalciferol (ERGOCALCIFEROL) 1.25 MG (29281 UT) capsule     furosemide (LASIX) 80 MG tablet     furosemide (LASIX) 80 MG tablet     gabapentin (NEURONTIN) 100 MG capsule     gabapentin (NEURONTIN) 100 MG capsule     lactase (LACTAID) 3000 UNIT tablet     multivitamin RENAL (NEPHROCAPS/TRIPHROCAPS) 1 MG capsule     No current facility-administered medications for this encounter.      Medication Prescriber:  [facility] [place]  Taking meds as prescribed? Yes  Medication side effects or concerns:   "NA  Outside medical appointments this week (list provider and reason for visit):  Dialysis        Dimension 3: Emotional/Behavioral Conditions & Complications -   Previous Dimension Ratin  Current Dimension Ratin  PHQ9     PHQ-9 SCORE 3/22/2021   PHQ-9 Total Score 9     GAD7     PILI-7 SCORE 3/22/2021   Total Score 10     Mental health diagnosis Patient reported the following previous mental health diagnoses: \"depression and anxiety\".    Date of last SIB:  NA  Date of  last SI:  NA  Date of last HI: NA  Behavioral Targets:  increase impulse control and  coping skills.   Current MH Assignments:  Mindfulness and 478 Breathing    Narrative:     Difficulty with impulse control and lacks coping skills.  Moderate symptoms of emotional, behavioral, or cognitive problems. Able to participate in most Tx activities. Clt reports anxiety over of 4 on scale of 1-10. This is lower than prev. Week due to clt ability to engage in mindfulness actions. Clt reports enjoying his new job and that its perfect for him     Dimension 4: Treatment Acceptance / Resistance -   Previous Dimension Ratin  Current Dimension Ratin  ALEXA Diagnosis:  Alcohol Use Disorder   303.90 (F10.20) Severe   Stage - 2  Commitment to tx process/Stage of change- Prep  ALEXA assignments - 10 Worst Consequences completed    Narrative -    Displays verbal compliance, but lacks consistent behaviors. Reprts he was reconnecting with sponsor and sober support. But not attending mtgs as yet as they are still suspended for COVID.       Dimension 5: Relapse / Continued Problem Potential -   Previous Dimension Rating: 3  Current Dimension Ratin  Relapses this week - None  Urges to use - None  UA results - None taken this week  Narrative- Poor recognition and understanding of relapse and recidivism issues and displays some vulnerability for further substance use or mental health problems. Has few coping skills, rarely applied.Client reports reconnecting " with sober support and sponsor.Clt reports desire to exercise for self care.      Dmension 6: Recovery Environment -   Previous Dimension Rating:  3  Current Dimension Ratin  Family Involvement -   Summarize attendance at family groups and family sessions - None  Family supportive of treatment?  Client reports family is supportive    Community support group attendance - None  Recreational activities - Gardening and walking  Narrative -  Engaged in structured, meaningful activity. Client reports reconnecting with sober support and sponsor. Client reports full time employment at job he is excited about.    Justification for Continued Treatment at this Level of Care:  Poor recognition and understanding of relapse and recidivism issues and displays some vulnerability for further substance use or mental health problems. Has few coping skills, rarely applied.  Discharge Planning:  Target Discharge Date/Timeframe:  21   Med Mgmt Provider/Appt:     Ind therapy Provider/Appt:     Family therapy Provider/Appt:          Has vulnerable adult status change? No    Service Coordination:  NA    Supervision:  NA    Are Treatment Plan goals/objectives effective? Yes  *If no, list changes to treatment plan:    Are the current goals meeting client's needs? Yes  *If no, list the changes to treatment plan.    Client Input / Response: NA      *Client agrees with any changes to the treatment plan: N/A  *Client received copy of changes: Yes  *Client is aware of right to access a treatment plan review: Yes

## 2021-06-07 ENCOUNTER — HOSPITAL ENCOUNTER (OUTPATIENT)
Dept: BEHAVIORAL HEALTH | Facility: CLINIC | Age: 56
End: 2021-06-07
Attending: FAMILY MEDICINE
Payer: COMMERCIAL

## 2021-06-07 PROCEDURE — H2035 A/D TX PROGRAM, PER HOUR: HCPCS | Mod: HQ,GT

## 2021-06-07 NOTE — ADDENDUM NOTE
Encounter addended by: Hermelindo Mann LADC on: 6/7/2021 1:37 PM   Actions taken: Charge Capture section accepted

## 2021-06-07 NOTE — GROUP NOTE
Group Therapy Documentation    PATIENT'S NAME: Monty Cox  MRN:   [id number]  :   1965  ACCT. NUMBER: [id number]  DATE OF SERVICE: 21  START TIME:  5:30 PM  END TIME:  7:30 PM  FACILITATOR(S): Hermelindo Mann LADC  TOPIC: BEH Group Therapy  Number of patients attending the group:  6  Group Length:  2 Hours    Group Therapy Type: Addiction    Summary of Group / Topics Discussed:    Group processed the importance of vulnerability in recovery and the cost of invulnerability.    Telemedicine Visit: The patient's condition can be safely assessed and treated via synchronous audio and visual telemedicine encounter.      Reason for Telemedicine Visit: Services only offered telehealth    Originating Site (Patient Location): Patient's home    Distant Site (Provider Location): Provider Remote Setting    Consent:  The patient/guardian has verbally consented to: the potential risks and benefits of telemedicine (video visit) versus in person care; bill my insurance or make self-payment for services provided; and responsibility for payment of non-covered services.     Mode of Communication:  Video Conference via [employer]    As the provider I attest to compliance with applicable laws and regulations related to telemedicine.       Group Attendance:  Attended group session    Patient's response to the group topic/interactions:  cooperative with task    Patient appeared to be Actively participating.        Client specific details:  Clients checked in and reported no change in sobriety date. Clt talked about their week since last group and recovery action taken being mindfulness and having a a water balloon fight with neighbor kids. He shared that he willingly went to [employer] to get his wife items she needed but couldn't go herself. Clt listened to peer talk about a recent return to use episode. Client discussed in group the process of a relapse in use and needing to practice awareness of his mood so that he can  intervene.

## 2021-06-08 PROCEDURE — H2035 A/D TX PROGRAM, PER HOUR: HCPCS | Mod: HQ,GT

## 2021-06-09 ENCOUNTER — HOSPITAL ENCOUNTER (OUTPATIENT)
Dept: BEHAVIORAL HEALTH | Facility: CLINIC | Age: 56
End: 2021-06-09
Attending: FAMILY MEDICINE
Payer: COMMERCIAL

## 2021-06-09 PROCEDURE — H2035 A/D TX PROGRAM, PER HOUR: HCPCS | Mod: HQ,GT

## 2021-06-10 ENCOUNTER — HOSPITAL ENCOUNTER (OUTPATIENT)
Dept: BEHAVIORAL HEALTH | Facility: CLINIC | Age: 56
End: 2021-06-10
Attending: FAMILY MEDICINE
Payer: COMMERCIAL

## 2021-06-10 PROCEDURE — H2035 A/D TX PROGRAM, PER HOUR: HCPCS | Mod: HQ,GT

## 2021-06-10 NOTE — GROUP NOTE
Group Therapy Documentation    PATIENT'S NAME: Monty Cox  MRN:   [id number]  :   1965  ACCT. NUMBER: [id number]  DATE OF SERVICE: 6/10/21  START TIME:  5:30 PM  END TIME:  7:30 PM  FACILITATOR(S): Hermelindo Mann LADC  TOPIC: BEH Group Therapy  Number of patients attending the group:  4  Group Length:  2 Hours    Group Therapy Type: Addiction    Summary of Group / Topics Discussed:    Coping Skills/Lifestyle Managemet and PAW SyndromeThis relates to Client Dim 3 goal: Learn how to apply self-care and psychotherapy to build a repertoire of daily habits that counteract PAWS, fatigue, depression and anxiety leading to increased resiliency and well-being.    Telemedicine Visit: The patient's condition can be safely assessed and treated via synchronous audio and visual telemedicine encounter.      Reason for Telemedicine Visit: Services only offered telehealth    Originating Site (Patient Location): Patient's home    Distant Site (Provider Location): Provider Remote Setting    Consent:  The patient/guardian has verbally consented to: the potential risks and benefits of telemedicine (video visit) versus in person care; bill my insurance or make self-payment for services provided; and responsibility for payment of non-covered services.     Mode of Communication:  Video Conference via [employer]    As the provider I attest to compliance with applicable laws and regulations related to telemedicine.       Group Attendance:  Attended group session    Patient's response to the group topic/interactions:  cooperative with task    Patient appeared to be Engaged.        Client specific details:  Client participated in the check-in process and reported no change in sobriety date.  Client participated in discussion about coping skills and the need to have sober support and its potential to impact recovery in the future. Client shared that he attends weekly Tuesday AA meetings and is meeting with his sponsor. 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

## 2021-06-10 NOTE — PROGRESS NOTES
[facility] Weekly Treatment Plan Review      ATTENDANCE for the following date span:  , 21 thru 21    Day     Group Therapy 2.0 hours 2.0 hours 2.0 hours   Individual Therapy      Family Therapy      Other (Specify)          Patient did not have any absences during this time period.     Weekly Treatment Plan Review     Treatment Plan initiated on: 21.    Dimension1: Acute Intoxication/Withdrawal Potential -   Previous Dimension Ratin  Current Dimension Ratin  Date of Last Use 21  Any reports of withdrawal symptoms - No        Dimension 2: Biomedical Conditions & Complications -   Previous Dimension Ratin  Current Dimension Ratin  Medical Concerns:    Current Medications & Medication Changes:  Current Outpatient Medications   Medication     ACCU-CHEK GUIDE test strip     acetaminophen (TYLENOL) 325 MG tablet     allopurinol (ZYLOPRIM) 100 MG tablet     allopurinol (ZYLOPRIM) 100 MG tablet     blood glucose (ACCU-CHEK GUIDE) test strip     blood glucose monitoring (ACCU-CHEK ERIKA PLUS) meter device kit     blood glucose monitoring (SOFTCLIX) lancets     blood glucose monitoring (SOFTCLIX) lancets     Blood Glucose Monitoring Suppl (ACCU-CHEK GUIDE) w/Device KIT     buPROPion (WELLBUTRIN XL) 150 MG 24 hr tablet     calcium carbonate 500 mg, elemental, (OSCAL 500) 1250 (500 Ca) MG TABS tablet     darbepoetin veronika (ARANESP) 40 MCG/ML injection     ergocalciferol (ERGOCALCIFEROL) 1.25 MG (60603 UT) capsule     furosemide (LASIX) 80 MG tablet     furosemide (LASIX) 80 MG tablet     gabapentin (NEURONTIN) 100 MG capsule     gabapentin (NEURONTIN) 100 MG capsule     lactase (LACTAID) 3000 UNIT tablet     multivitamin RENAL (NEPHROCAPS/TRIPHROCAPS) 1 MG capsule     No current facility-administered medications for this encounter.      Medication Prescriber:  [facility] [place] [place]  Taking meds as prescribed? Yes  Medication side effects or concerns:   "NA  Outside medical appointments this week (list provider and reason for visit):  Dialysis        Dimension 3: Emotional/Behavioral Conditions & Complications -   Previous Dimension Ratin  Current Dimension Ratin  PHQ9     PHQ-9 SCORE 3/22/2021   PHQ-9 Total Score 9     GAD7     PILI-7 SCORE 3/22/2021   Total Score 10     Mental health diagnosis Patient reported the following previous mental health diagnoses: \"depression and anxiety\".    Date of last SIB:  NA  Date of  last SI:  NA  Date of last HI: NA  Behavioral Targets:  increase impulse control and  coping skills.   Current MH Assignments:  Mindfulness and 478 Breathing    Narrative:     Difficulty with impulse control and lacks coping skills.  Moderate symptoms of emotional, behavioral, or cognitive problems. Able to participate in most Tx activities. Clt reports anxiety over of 8 and depression 2 on scale of 1-10. This is due to client focus on finding suitable assisted lvg for his mother. Clt report opportunities to engage in \"fun\" with neighborhood kids.     Dimension 4: Treatment Acceptance / Resistance -   Previous Dimension Ratin  Current Dimension Ratin  ALEXA Diagnosis:  Alcohol Use Disorder   303.90 (F10.20) Severe   Stage - 2  Commitment to tx process/Stage of change- Prep  ALEXA assignments - 10 Worst Consequences completed    Narrative -    Displays verbal compliance, but lacks consistent behaviors. Reprts he was reconnecting with sponsor and sober support. But not attending mtgs as yet as they are still suspended for COVID.       Dimension 5: Relapse / Continued Problem Potential -   Previous Dimension Rating: 3  Current Dimension Ratin  Relapses this week - None  Urges to use - None  UA results - None taken this week  Narrative- Poor recognition and understanding of relapse and recidivism issues and displays some vulnerability for further substance use or mental health problems. Has few coping skills, rarely applied.Client " reports reconnecting with sober support and sponsor.Clt reports desire to exercise for self care. Clt reports calling group peer and finding it very helpful.      Dmension 6: Recovery Environment -   Previous Dimension Rating:  3  Current Dimension Ratin  Family Involvement -   Summarize attendance at family groups and family sessions - None  Family supportive of treatment?  Client reports family is supportive    Community support group attendance - None  Recreational activities - Gardening and walking  Narrative -  Engaged in structured, meaningful activity. Client reports reconnecting with sober support and sponsor. Client reports full time employment at job he is excited about.    Justification for Continued Treatment at this Level of Care:  Poor recognition and understanding of relapse and recidivism issues and displays some vulnerability for further substance use or mental health problems. Has few coping skills, rarely applied.  Discharge Planning:  Target Discharge Date/Timeframe:  21   Med Mgmt Provider/Appt:     Ind therapy Provider/Appt:     Family therapy Provider/Appt:          Has vulnerable adult status change? No    Service Coordination:  NA    Supervision:  NA    Are Treatment Plan goals/objectives effective? Yes  *If no, list changes to treatment plan:    Are the current goals meeting client's needs? Yes  *If no, list the changes to treatment plan.    Client Input / Response: NA      *Client agrees with any changes to the treatment plan: N/A  *Client received copy of changes: Yes  *Client is aware of right to access a treatment plan review: Yes

## 2021-06-14 ENCOUNTER — HOSPITAL ENCOUNTER (OUTPATIENT)
Dept: BEHAVIORAL HEALTH | Facility: CLINIC | Age: 56
End: 2021-06-14
Attending: FAMILY MEDICINE
Payer: COMMERCIAL

## 2021-06-14 PROCEDURE — H2035 A/D TX PROGRAM, PER HOUR: HCPCS | Mod: HQ,GT

## 2021-06-14 NOTE — GROUP NOTE
Group Therapy Documentation    PATIENT'S NAME: Monty Cox  MRN:   [id number]  :   1965  [id number]  DATE OF SERVICE: 21  START TIME:  5:30 PM  END TIME:  7:30 PM  FACILITATOR(S): Hermelindo Mann LADC  TOPIC: BEH Group Therapy  Number of patients attending the group:  7  Group Length:  2 Hours    Group Therapy Type: Addiction    Summary of Group / Topics Discussed:    Psychoeducation/Skills Relapse Prevention (ALEXA)  This topic will give a general overview of basic relapse prevention, including definitions of warning signs, triggers and cravings and the importance of addressing healthy coping skills for ongoing relapse prevention.    Objective(s):    Patients will identify 4 key warning signs and triggers    Patients will identify 4 healthy coping mechanisms         Structure (modalities, homework, worksheets, etc.):    Provide psychoeducation on relapse prevention and healthy coping methods.    Facilitate group discussion around each patient s current awareness of warning signs,  triggers and cravings.     Expected therapeutic outcome(s):    Patient will:    Be able to manage triggers and cravings without returning to substance use.      Therapeutic outcomes measured by:    Patients will name 4 of their warning signs and triggers    Patients will name 4 healthy coping mechanisms for dealing with their warning signs and triggers  Telemedicine Visit: The patient's condition can be safely assessed and treated via synchronous audio and visual telemedicine encounter.      Reason for Telemedicine Visit: Services only offered telehealth    Originating Site (Patient Location): Patient's home    Distant Site (Provider Location): Provider Remote Setting    Consent:  The patient/guardian has verbally consented to: the potential risks and benefits of telemedicine (video visit) versus in person care; bill my insurance or make self-payment for services provided; and responsibility for payment of  non-covered services.     Mode of Communication:  Video Conference via dooyoo    As the provider I attest to compliance with applicable laws and regulations related to telemedicine.        Group Attendance:  Attended group session    Patient's response to the group topic/interactions:  cooperative with task    Patient appeared to be Attentive.        Client specific details:  Client participated with the check-in process and reported no change in sobriety date. Client reported recovery action since last group of supporting hiw mother. He shared this was a tough weekend for him as he had to take his mother out of her home put her in a memory care unit. Client identified two triggers for use being a being anxiety depressionnd  and one coping skill of  Of his growing mindfulness practice.                            .

## 2021-06-17 ENCOUNTER — HOSPITAL ENCOUNTER (OUTPATIENT)
Dept: BEHAVIORAL HEALTH | Facility: CLINIC | Age: 56
End: 2021-06-17
Attending: FAMILY MEDICINE
Payer: COMMERCIAL

## 2021-06-17 PROCEDURE — H2035 A/D TX PROGRAM, PER HOUR: HCPCS | Mod: HQ,95

## 2021-06-17 NOTE — PROGRESS NOTES
[facility] Weekly Treatment Plan Review      ATTENDANCE for the following date span:  21 thru 21    Day     Group Therapy 2.0 hours 2.0 hours 2.0 hours   Individual Therapy      Family Therapy      Other (Specify)          Patient did not have any absences during this time period.     Weekly Treatment Plan Review     Treatment Plan initiated on: 21.    Dimension1: Acute Intoxication/Withdrawal Potential -   Previous Dimension Ratin  Current Dimension Ratin  Date of Last Use 21  Any reports of withdrawal symptoms - No      Dimension 2: Biomedical Conditions & Complications -   Previous Dimension Ratin  Current Dimension Ratin  Medical Concerns:    Current Medications & Medication Changes:  Current Outpatient Medications   Medication     ACCU-CHEK GUIDE test strip     acetaminophen (TYLENOL) 325 MG tablet     allopurinol (ZYLOPRIM) 100 MG tablet     allopurinol (ZYLOPRIM) 100 MG tablet     blood glucose (ACCU-CHEK GUIDE) test strip     blood glucose monitoring (ACCU-CHEK ERIKA PLUS) meter device kit     blood glucose monitoring (SOFTCLIX) lancets     blood glucose monitoring (SOFTCLIX) lancets     Blood Glucose Monitoring Suppl (ACCU-CHEK GUIDE) w/Device KIT     buPROPion (WELLBUTRIN XL) 150 MG 24 hr tablet     calcium carbonate 500 mg, elemental, (OSCAL 500) 1250 (500 Ca) MG TABS tablet     darbepoetin veronika (ARANESP) 40 MCG/ML injection     ergocalciferol (ERGOCALCIFEROL) 1.25 MG (44688 UT) capsule     furosemide (LASIX) 80 MG tablet     furosemide (LASIX) 80 MG tablet     gabapentin (NEURONTIN) 100 MG capsule     gabapentin (NEURONTIN) 100 MG capsule     lactase (LACTAID) 3000 UNIT tablet     multivitamin RENAL (NEPHROCAPS/TRIPHROCAPS) 1 MG capsule     No current facility-administered medications for this encounter.      Medication Prescriber:  [facility]  Taking meds as prescribed? Yes  Medication side effects or concerns:  NA  Outside  "medical appointments this week (list provider and reason for visit):  Dialysis        Dimension 3: Emotional/Behavioral Conditions & Complications -   Previous Dimension Ratin  Current Dimension Ratin  PHQ9     PHQ-9 SCORE 3/22/2021   PHQ-9 Total Score 9     GAD7     PILI-7 SCORE 3/22/2021   Total Score 10     Mental health diagnosis Patient reported the following previous mental health diagnoses: \"depression and anxiety\".    Date of last SIB:  NA  Date of  last SI:  NA  Date of last HI: NA  Behavioral Targets:  increase impulse control and  coping skills.   Current MH Assignments:  Mindfulness and 478 Breathing    Narrative:   Has impulse control and coping skills. Clt report opportunities to engage in \"fun\" with neighborhood kids. Reports stress related to moving his mother to memory care housing.    Diension 4: Treatment Acceptance / Resistance -   Previous Dimension Ratin  Current Dimension Ratin  ALEXA Diagnosis:  Alcohol Use Disorder   303.90 (F10.20) Severe   Commitment to tx process/Stage of change- Action  ALEXA assignments - [address] [place] completed    Narrative -    Displays verbal compliance, but lacks consistent behaviors. Reprts he was reconnecting with sponsor and sober support. Reports attending regular Tuesday night AA. Has sponsor.       Dimension 5: Relapse / Continued Problem Potential -   Previous Dimension Rating: 3  Current Dimension Ratin  Relapses this week - None  Urges to use - None  UA results - None taken this week  Narrative- Recognizes relapse issues and prevention strategies, but displays some vulnerability for further substance use or mental health problems. .Clt reports desire to exercise for self care. Clt reports calling group peer and finding it very helpful.    Dmension 6: Recovery Environment -   Previous Dimension Rating:  3  Current Dimension Ratin  Family Involvement -   Summarize attendance at family groups and family sessions - None  Family " supportive of treatment?  Client reports family is supportive    Community support group attendance - None  Recreational activities - Gardening and walking  Narrative -  Engaged in structured, meaningful activity. Client reports reconnecting with sober support and sponsor. Client reports full time employment.    Justification for Continued Treatment at this Level of Care:   displays some vulnerability for further substance use or mental health problems  Discharge Planning:  Target Discharge Date/Timeframe:  8/19/21   Med Mgmt Provider/Appt:     Ind therapy Provider/Appt:     Family therapy Provider/Appt:          Has vulnerable adult status change? No    Service Coordination:  NA    Supervision:  NA    Are Treatment Plan goals/objectives effective? Yes  *If no, list changes to treatment plan:    Are the current goals meeting client's needs? Yes  *If no, list the changes to treatment plan.    Client Input / Response: NA      *Client agrees with any changes to the treatment plan: N/A  *Client received copy of changes: Yes  *Client is aware of right to access a treatment plan review: Yes

## 2021-06-17 NOTE — GROUP NOTE
Group Therapy Documentation    PATIENT'S NAME: Monty Cox  MRN:   [id number]  :   1965  ACCT. NUMBER: [id number]  DATE OF SERVICE: 21  START TIME:  5:30 PM  END TIME:  7:30 PM  FACILITATOR(S): Hermelindo Mann LADC  TOPIC: BEH Group Therapy  Number of patients attending the group:  6  Group Length:  2 Hours    Group Therapy Type: Addiction    Summary of Group / Topics Discussed:    Complete Relapse Prevention Topic started 21 , Client explored the topic of Relapse prevention by learning how predictable and identifiable warning signs that accumulate and snowball and can play a role in triggering the addictive thinking that precedes a return to use episode.      Telemedicine Visit: The patient's condition can be safely assessed and treated via synchronous audio and visual telemedicine encounter.      Reason for Telemedicine Visit: Services only offered telehealth    Originating Site (Patient Location): Patient's home    Distant Site (Provider Location): [facility]:      Consent:  The patient/guardian has verbally consented to: the potential risks and benefits of telemedicine (video visit) versus in person care; bill my insurance or make self-payment for services provided; and responsibility for payment of non-covered services.     Mode of Communication:  Video Conference via Hansoft    As the provider I attest to compliance with applicable laws and regulations related to telemedicine.        Group Attendance:  Attended group session    Patient's response to the group topic/interactions:  cooperative with task    Patient appeared to be Actively participating.        Client specific details:  Client participated in introductions where client shared followed by a brief meditation on gratitude and a reading from the book 24 Hours. A short discussion followed each where clients had the opportunity to share thoughts. Clt shared that the recovery actions he palns to take this  weekend to support his recovery are going [place] [email or web address] [employer] and biking with his wife. He said he would call two group peers over the weekend...

## 2021-06-21 ENCOUNTER — HOSPITAL ENCOUNTER (OUTPATIENT)
Dept: BEHAVIORAL HEALTH | Facility: CLINIC | Age: 56
End: 2021-06-21
Attending: FAMILY MEDICINE
Payer: COMMERCIAL

## 2021-06-21 PROCEDURE — H2035 A/D TX PROGRAM, PER HOUR: HCPCS | Mod: HQ,95

## 2021-06-21 NOTE — GROUP NOTE
"Group Therapy Documentation    PATIENT'S NAME: Monty Cox  MRN:   [id number]  :   1965  ACCT. NUMBER: [id number]  DATE OF SERVICE: 21  START TIME:  5:30 PM  END TIME:  7:30 PM  FACILITATOR(S): Hermelindo Mann LADC  TOPIC: BEH Group Therapy  Number of patients attending the group:  7  Group Length:  2 Hours    Group Therapy Type: Addiction    Summary of Group / Topics Discussed:    Client participated in group introductions followed by a brief guided meditation for mindfulness skill development and a group reading from a daily inspirational book. Group explored and discussed topics of \"barriers to recovery and \"Honesty, Openness and Willingness\".  Clt self-assessed in group their barriers to recovery and shared how it has thwarted previous thoughts and attempts to stop using. This relates to clients Dim 3 treatment plan goal. of Learn how to apply self-care and psychotherapy to build a repertoire of daily habits that counteract addictive thinking (depression and anxiety, guilt and shame) leading to increased resiliency and well-being.    Telemedicine Visit: The patient's condition can be safely assessed and treated via synchronous audio and visual telemedicine encounter.      Reason for Telemedicine Visit: Services only offered telehealth    Originating Site (Patient Location): Patient's home    Distant Site (Provider Location): [facility]:      Consent:  The patient/guardian has verbally consented to: the potential risks and benefits of telemedicine (video visit) versus in person care; bill my insurance or make self-payment for services provided; and responsibility for payment of non-covered services.     Mode of Communication:  Video Conference via [employer]erated    As the provider I attest to compliance with applicable laws and regulations related to telemedicine.       Group Attendance:  Attended group session    Patient's response to the group topic/interactions:  cooperative " with task    Patient appeared to be Engaged.        Client specific details: .Client participated in group introductions followed by a brief guided meditation for mindfulness skill development and a group reading from a daily inspirational book. Client shared that honesty was a major barrier in the past and identified a regular practice of calling other in recovery to share  as tools that will provide recovery support.

## 2021-06-29 ENCOUNTER — HOSPITAL ENCOUNTER (OUTPATIENT)
Dept: BEHAVIORAL HEALTH | Facility: CLINIC | Age: 56
End: 2021-06-29
Attending: FAMILY MEDICINE
Payer: COMMERCIAL

## 2021-06-29 PROCEDURE — H2035 A/D TX PROGRAM, PER HOUR: HCPCS | Mod: HQ,GT

## 2021-06-29 NOTE — GROUP NOTE
Group Therapy Documentation    PATIENT'S NAME: Monty Cox  MRN:   [id number]  :   1965  ACCT. NUMBER: [id number]  DATE OF SERVICE: 21  START TIME:  5:30 PM  END TIME:  7:30 PM  FACILITATOR(S): Hermelindo Mann LADC  TOPIC: BEH Group Therapy  Number of patients attending the group:  10  Group Length:  2 Hours    Group Therapy Type: Life skill(s)    Summary of Group / Topics Discussed: Combined Phase 3 groups from [place] and [place]    Choices in Recovery: Clients watched a portion of the Video:  The Anonymous People  which highlights 3 primary successful recovery premises: 1) The goodman against addiction stigma isn't over yet. 2) Coming out of the shadows is important for personal recovery and to help others. 3) Community is the backbone of recovery.    Telemedicine Visit: The patient's condition can be safely assessed and treated via synchronous audio and visual telemedicine encounter.      Reason for Telemedicine Visit: Services only offered telehealth    Originating Site (Patient Location): Patient's home    Distant Site (Provider Location): [facility] Outpatient Setting: [place]    Consent:  The patient/guardian has verbally consented to: the potential risks and benefits of telemedicine (video visit) versus in person care; bill my insurance or make self-payment for services provided; and responsibility for payment of non-covered services.     Mode of Communication:  Video Conference via DoctorC    As the provider I attest to compliance with applicable laws and regulations related to telemedicine.       Group Attendance:  Attended group session    Patient's response to the group topic/interactions:  expressed understanding of topic    Patient appeared to be Engaged.        Client specific details:  Client participated in weekly check-in process with group and identified 3 reasons to stay sober. Client participated in group introductions followed by a brief guided meditation for  mindfulness skill development and a group reading from a daily inspirational book. Clt shared that he really likes AA and his Tuesday home group in particular. He is still exploring more meetings.

## 2021-06-30 NOTE — ADDENDUM NOTE
Encounter addended by: Hermelindo Mann LADC on: 6/30/2021 3:57 PM   Actions taken: Clinical Note Signed

## 2021-06-30 NOTE — PROGRESS NOTES
[facility] Weekly Treatment Plan Review      ATTENDANCE for the following date span:  21thru , 21    Day Monday    Group Therapy 2.0 hours   Individual Therapy    Family Therapy    Other (Specify)        Patient was absent for start of phase 3 on . It is believed he forgot the change in his scheduling from p2 to p3.Clt attended reg AA group on Tuesday and forgot he started phase 3.    Weekly Treatment Plan Review     Treatment Plan initiated on: 21.    Dimension1: Acute Intoxication/Withdrawal Potential -   Previous Dimension Ratin  Current Dimension Ratin  Date of Last Use 21  Any reports of withdrawal symptoms - No      Dimension 2: Biomedical Conditions & Complications -   Previous Dimension Ratin  Current Dimension Ratin  Medical Concerns:    Current Medications & Medication Changes:  Current Outpatient Medications   Medication     ACCU-CHEK GUIDE test strip     acetaminophen (TYLENOL) 325 MG tablet     allopurinol (ZYLOPRIM) 100 MG tablet     allopurinol (ZYLOPRIM) 100 MG tablet     blood glucose (ACCU-CHEK GUIDE) test strip     blood glucose monitoring (ACCU-CHEK ERIKA PLUS) meter device kit     blood glucose monitoring (SOFTCLIX) lancets     blood glucose monitoring (SOFTCLIX) lancets     Blood Glucose Monitoring Suppl (ACCU-CHEK GUIDE) w/Device KIT     buPROPion (WELLBUTRIN XL) 150 MG 24 hr tablet     calcium carbonate 500 mg, elemental, (OSCAL 500) 1250 (500 Ca) MG TABS tablet     darbepoetin veronika (ARANESP) 40 MCG/ML injection     ergocalciferol (ERGOCALCIFEROL) 1.25 MG (22031 UT) capsule     furosemide (LASIX) 80 MG tablet     furosemide (LASIX) 80 MG tablet     gabapentin (NEURONTIN) 100 MG capsule     gabapentin (NEURONTIN) 100 MG capsule     lactase (LACTAID) 3000 UNIT tablet     multivitamin RENAL (NEPHROCAPS/TRIPHROCAPS) 1 MG capsule     No current facility-administered medications for this encounter.      Medication [place]   [place] [place]  Taking meds as prescribed? Yes  Medication side effects or concerns:  NA  Outside medical appointments this week (list provider and reason for visit):  Dialysis        Dimension 3: Emotional/Behavioral Conditions & Complications -   Previous Dimension Ratin  Current Dimension Ratin  PHQ9     PHQ-9 SCORE 3/22/2021   PHQ-9 Total Score 9     GAD7     PILI-7 SCORE 3/22/2021   Total Score 10     Mental health diagnosis Patient reported the following previous mental health diagnoses: \"depression and anxiety\".    Date of last SIB:  NA  Date of  last SI:  NA  Date of last HI: NA  Behavioral Targets:  increase impulse control and  coping skills.   Current MH Assignments:  Mindfulness and [address] Breathing    Narrative:   Has impulse control and coping skills. Clt report opportunities to engage in \"fun\" with neighborhood kids. Reports stress related to moving his mother to memory care housing.    Diension 4: Treatment Acceptance / Resistance -   Previous Dimension Ratin  Current Dimension Ratin  ALEXA Diagnosis:  Alcohol Use Disorder   303.90 (F10.20) Severe   Commitment to tx process/Stage of change- Action  ALEXA assignments - Recovery Care Planninng    Narrative -    Displays verbal compliance, but lacks consistent behaviors. Reprts he was reconnecting with sponsor and sober support. Reports attending regular Tuesday night AA. Has sponsor.       Dimension 5: Relapse / Continued Problem Potential -   Previous Dimension Rating: 3  Current Dimension Ratin  Relapses this week - None  Urges to use - None  UA results - None taken this week  Narrative- Recognizes relapse issues and prevention strategies, but displays some vulnerability for further substance use or mental health problems. .Clt reports desire to exercise for self care. Clt reports calling group peer and finding it very helpful.    Dmension 6: Recovery Environment -   Previous Dimension Rating:  3  Current Dimension Ratin  Family " Involvement -   Summarize attendance at family groups and family sessions - None  Family supportive of treatment?  Client reports family is supportive    Community support group attendance - None  Recreational activities - Gardening and walking  Narrative -  Engaged in structured, meaningful activity. Client reports reconnecting with sober support and sponsor. Client reports full time employment.    Justification for Continued Treatment at this Level of Care:   displays some vulnerability for further substance use or mental health problems  Discharge Planning:  Target Discharge Date/Timeframe:  8/19/21   Med Mgmt Provider/Appt:     Ind therapy Provider/Appt:     Family therapy Provider/Appt:          Has vulnerable adult status change? No    Service Coordination:  NA    Supervision:  NA    Are Treatment Plan goals/objectives effective? Yes  *If no, list changes to treatment plan:    Are the current goals meeting client's needs? Yes  *If no, list the changes to treatment plan.    Client Input / Response: NA      *Client agrees with any changes to the treatment plan: N/A  *Client received copy of changes: Yes  *Client is aware of right to access a treatment plan review: Yes

## 2021-07-06 ENCOUNTER — HOSPITAL ENCOUNTER (OUTPATIENT)
Dept: BEHAVIORAL HEALTH | Facility: CLINIC | Age: 56
End: 2021-07-06
Attending: FAMILY MEDICINE
Payer: COMMERCIAL

## 2021-07-06 NOTE — GROUP NOTE
"Group Therapy Documentation    PATIENT'S NAME: Monty Cox  MRN:   [id number]  :   1965  ACCT. NUMBER: [id number]  DATE OF SERVICE: 21  START TIME:  5:30 PM  END TIME:  7:30 PM  FACILITATOR(S): Hermelindo Mann LADC  TOPIC: BEH Group Therapy  Number of patients attending the group:  6  Group Length:  2 Hours    Group Therapy Type: Life skill(s)    Summary of Group / Topics Discussed:    Coping Skills/Lifestyle Managemet: Clts explored changes/choices needed for Long term recovery.regarding Lifestyle changes Recovery activities, Daily structure, Self-care, Support systems, Spirituality, Work,  Finances, Recreation / Exercise and Crisis management    Telemedicine Visit: The patient's condition can be safely assessed and treated via synchronous audio and visual telemedicine encounter.      Reason for Telemedicine Visit: Services only offered telehealth    Originating Site (Patient Location): Patient's home    Distant Site (Provider Location): Provider Remote Setting- Home Office    Consent:  The patient/guardian has verbally consented to: the potential risks and benefits of telemedicine (video visit) versus in person care; bill my insurance or make self-payment for services provided; and responsibility for payment of non-covered services.     Mode of Communication:  Video Conference via [employer]    As the provider I attest to compliance with applicable laws and regulations related to telemedicine.       Group Attendance:  Attended group session    Patient's response to the group topic/interactions:  cooperative with task    Patient appeared to be Attentive.        Client specific details:  .Clt shared that he had a good holiday weekend and attended a small BBQ on . There was some drinking but he did not feel triggered. He enjoyed having  \"a nerf gun fight\" with neighborhood kids.  "

## 2021-07-07 NOTE — ADDENDUM NOTE
Encounter addended by: Hermelindo Mann LADC on: 7/7/2021 2:30 PM   Actions taken: Clinical Note Signed

## 2021-07-07 NOTE — PROGRESS NOTES
[facility] Weekly Treatment Plan Review      ATTENDANCE for the following date span:  Monday, 21 thru , 20    Day Monday    Group Therapy 2.0 hours   Individual Therapy    Family Therapy    Other (Specify)        No absences this week.    Weekly Treatment Plan Review     Treatment Plan initiated on: 21.    Dimension1: Acute Intoxication/Withdrawal Potential -   Previous Dimension Ratin  Current Dimension Ratin  Date of Last Use 21  Any reports of withdrawal symptoms - No      Dimension 2: Biomedical Conditions & Complications -   Previous Dimension Ratin  Current Dimension Ratin  Medical Concerns:    Current Medications & Medication Changes:  Current Outpatient Medications   Medication     ACCU-CHEK GUIDE test strip     acetaminophen (TYLENOL) 325 MG tablet     allopurinol (ZYLOPRIM) 100 MG tablet     allopurinol (ZYLOPRIM) 100 MG tablet     blood glucose (ACCU-CHEK GUIDE) test strip     blood glucose monitoring (ACCU-CHEK ERIKA PLUS) meter device kit     blood glucose monitoring (SOFTCLIX) lancets     blood glucose monitoring (SOFTCLIX) lancets     Blood Glucose Monitoring Suppl (ACCU-CHEK GUIDE) w/Device KIT     buPROPion (WELLBUTRIN XL) 150 MG 24 hr tablet     calcium carbonate 500 mg, elemental, (OSCAL 500) 1250 (500 Ca) MG TABS tablet     darbepoetin veronika (ARANESP) 40 MCG/ML injection     ergocalciferol (ERGOCALCIFEROL) 1.25 MG (96566 UT) capsule     furosemide (LASIX) 80 MG tablet     furosemide (LASIX) 80 MG tablet     gabapentin (NEURONTIN) 100 MG capsule     gabapentin (NEURONTIN) 100 MG capsule     lactase (LACTAID) 3000 UNIT tablet     multivitamin RENAL (NEPHROCAPS/TRIPHROCAPS) 1 MG capsule     No current facility-administered medications for this encounter.      Medication Prescriber:  [facility] [place]  Taking meds as prescribed? Yes  Medication side effects or concerns:  NA  Outside medical appointments this week (list provider and reason for visit):   "Dialysis        Dimension 3: Emotional/Behavioral Conditions & Complications -   Previous Dimension Ratin  Current Dimension Ratin  PHQ9     PHQ-9 SCORE 3/22/2021   PHQ-9 Total Score 9     GAD7     PILI-7 SCORE 3/22/2021   Total Score 10     Mental health diagnosis Patient reported the following previous mental health diagnoses: \"depression and anxiety\".    Date of last SIB:  NA  Date of  last SI:  NA  Date of last HI: NA  Behavioral Targets:  increase impulse control and  coping skills.   Current MH Assignments:  Mindfulness and 478 Breathing    Narrative:   Has impulse control and coping skills. Clt report opportunities to engage in \"fun\" with neighborhood kids. Reports stress related to moving his mother to memory care housing.    Diension 4: Treatment Acceptance / Resistance -   Previous Dimension Ratin  Current Dimension Ratin  ALEXA Diagnosis:  Alcohol Use Disorder   303.90 (F10.20) Severe   Commitment to tx process/Stage of change- Action  ALEXA assignments - Recovery Care Planninng    Narrative -    Displays verbal compliance, but lacks consistent behaviors. Reprts he was reconnecting with sponsor and sober support. Reports attending regular Tuesday night AA. Has sponsor.       Dimension 5: Relapse / Continued Problem Potential -   Previous Dimension Rating: 3  Current Dimension Ratin  Relapses this week - None  Urges to use - None  UA results - None taken this week  Narrative- Recognizes relapse issues and prevention strategies, but displays some vulnerability for further substance use or mental health problems. .Clt reports desire to exercise for self care. Clt reports calling group peer and finding it very helpful.    Dmension 6: Recovery Environment -   Previous Dimension Rating:  3  Current Dimension Ratin  Family Involvement -   Summarize attendance at family groups and family sessions - None  Family supportive of treatment?  Client reports family is supportive    Community " support group attendance - None  Recreational activities - Gardening and walking  Narrative -  Engaged in structured, meaningful activity. Client reports reconnecting with sober support and sponsor. Client reports full time employment.    Justification for Continued Treatment at this Level of Care:   displays some vulnerability for further substance use or mental health problems  Discharge Planning:  Target Discharge Date/Timeframe:  8/19/21   Med Mgmt Provider/Appt:     Ind therapy Provider/Appt:     Family therapy Provider/Appt:          Has vulnerable adult status change? No    Service Coordination:  NA    Supervision:  NA    Are Treatment Plan goals/objectives effective? Yes  *If no, list changes to treatment plan:    Are the current goals meeting client's needs? Yes  *If no, list the changes to treatment plan.    Client Input / Response: NA      *Client agrees with any changes to the treatment plan: N/A  *Client received copy of changes: Yes  *Client is aware of right to access a treatment plan review: Yes

## 2021-07-13 ENCOUNTER — HOSPITAL ENCOUNTER (OUTPATIENT)
Dept: BEHAVIORAL HEALTH | Facility: CLINIC | Age: 56
End: 2021-07-13
Attending: FAMILY MEDICINE
Payer: COMMERCIAL

## 2021-07-13 PROCEDURE — H2035 A/D TX PROGRAM, PER HOUR: HCPCS | Mod: HQ,95

## 2021-07-13 NOTE — GROUP NOTE
Group Therapy Documentation    PATIENT'S NAME: Monty Cox  MRN:   [id number]  :   1965  ACCT. NUMBER: [id number]  DATE OF SERVICE: 21  START TIME:  5:30 PM  END TIME:  7:30 PM  FACILITATOR(S): Hermelindo Mann LADC  TOPIC: BEH Group Therapy  Number of patients attending the group:    Group Length:  2 Hours    Group Therapy Type: Addiction    Summary of Group / Topics Discussed:    Choices in Recovery/ Developing a relapse prevention plan that includes: Lifestyle changes, Recovery activities, Daily structure, Self-care, Support systems, Spirituality, Work , Finances , Recreation / Fun and Crisis management.    Telemedicine Visit: The patient's condition can be safely assessed and treated via synchronous audio and visual telemedicine encounter.      Reason for Telemedicine Visit: Services only offered telehealth    Originating Site (Patient Location): Patient's home    Distant Site (Provider Location): Provider Remote Setting- Home Office    Consent:  The patient/guardian has verbally consented to: the potential risks and benefits of telemedicine (video visit) versus in person care; bill my insurance or make self-payment for services provided; and responsibility for payment of non-covered services.     Mode of Communication:  Video Conference via [employer]    As the provider I attest to compliance with applicable laws and regulations related to telemedicine.       Group Attendance:  Attended group session    Patient's response to the group topic/interactions:  expressed readiness to alter behaviors    Patient appeared to be Actively participating.        Client specific details:  Client reported no change in sobriety date.  Client expressed feeling tired tonight because he had spent most of the day running errands and getting his vivitrol shot..  Relapse Prevention was addressed through discussion of changes to Lifestyle changes, Recovery activities, Daily structure, Self-care, Support systems,  "Spirituality, Work , Finances , Recreation / Fun and Crisis management.. Clients also reviewed the  8 essentials for recovery  and self-assessed their readiness for recovery. Clt shared with group how he feels he has changed by being more intentional about the way he engages in life. He is paying more attn to his MH and self care and using the tools/people who are available for sober support like his family and AA connections rather than trying to do \"recovery\" himself. This relates to client s Dimension 5 goal to Develop sober coping and living skills in order to address the development of a strategy for long term recovery..      "

## 2021-07-14 NOTE — ADDENDUM NOTE
Encounter addended by: Hermelindo Mann LADC on: 7/14/2021 2:19 PM   Actions taken: Clinical Note Signed

## 2021-07-14 NOTE — PROGRESS NOTES
[facility] Weekly Treatment Plan Review      ATTENDANCE for the following date span:  Monday, 21 thru 21    Day Monday    Group Therapy 2.0 hours   Individual Therapy    Family Therapy    Other (Specify)        No absences this week.    Weekly Treatment Plan Review     Treatment Plan initiated on: 21.    Dimension1: Acute Intoxication/Withdrawal Potential -   Previous Dimension Ratin  Current Dimension Ratin  Date of Last Use 21  Any reports of withdrawal symptoms - No      Dimension 2: Biomedical Conditions & Complications -   Previous Dimension Ratin  Current Dimension Ratin  Medical Concerns:    Current Medications & Medication Changes:  Current Outpatient Medications   Medication     ACCU-CHEK GUIDE test strip     acetaminophen (TYLENOL) 325 MG tablet     allopurinol (ZYLOPRIM) 100 MG tablet     allopurinol (ZYLOPRIM) 100 MG tablet     blood glucose (ACCU-CHEK GUIDE) test strip     blood glucose monitoring (ACCU-CHEK ERIKA PLUS) meter device kit     blood glucose monitoring (SOFTCLIX) lancets     blood glucose monitoring (SOFTCLIX) lancets     Blood Glucose Monitoring Suppl (ACCU-CHEK GUIDE) w/Device KIT     buPROPion (WELLBUTRIN XL) 150 MG 24 hr tablet     calcium carbonate 500 mg, elemental, (OSCAL 500) 1250 (500 Ca) MG TABS tablet     darbepoetin veronika (ARANESP) 40 MCG/ML injection     ergocalciferol (ERGOCALCIFEROL) 1.25 MG (55756 UT) capsule     furosemide (LASIX) 80 MG tablet     furosemide (LASIX) 80 MG tablet     gabapentin (NEURONTIN) 100 MG capsule     gabapentin (NEURONTIN) 100 MG capsule     lactase (LACTAID) 3000 UNIT tablet     multivitamin RENAL (NEPHROCAPS/TRIPHROCAPS) 1 MG capsule     No current facility-administered medications for this encounter.     Medication Prescriber:  [facility] [place]  Taking meds as prescribed? Yes  Medication side effects or concerns:  NA  Outside medical appointments this week (list provider and reason for visit):  Dialysis and  "vivitrol        Dimension 3: Emotional/Behavioral Conditions & Complications -   Previous Dimension Ratin  Current Dimension Ratin  PHQ9     PHQ-9 SCORE 3/22/2021   PHQ-9 Total Score 9     GAD7     PILI-7 SCORE 3/22/2021   Total Score 10     Mental health diagnosis Patient reported the following previous mental health diagnoses: \"depression and anxiety\".    Date of last SIB:  NA  Date of  last SI:  NA  Date of last HI: NA  Behavioral Targets:  increase impulse control and  coping skills.   Current  Assignments:  Mindfulness and 478 Breathing    Narrative:   Has impulse control and coping skills. Clt report opportunities to engage in \"fun\" with neighborhood kids. Reports stress related to moving his mother to memory care housing.    Diension 4: Treatment Acceptance / Resistance -   Previous Dimension Ratin  Current Dimension Ratin  ALEXA Diagnosis:  Alcohol Use Disorder   303.90 (F10.20) Severe   Commitment to tx process/Stage of change- Action  ALEXA assignments - Recovery Care Planninng    Narrative -    Displays verbal compliance, but lacks consistent behaviors. Reprts he was reconnecting with sponsor and sober support. Reports looking for another AA mtg due to conflict with Tuesday [employer] group. Has sponsor.       Dimension 5: Relapse / Continued Problem Potential -   Previous Dimension Rating: 3  Current Dimension Ratin  Relapses this week - None  Urges to use - None  UA results - None taken this week  Narrative- Recognizes relapse issues and prevention strategies, but displays some vulnerability for further substance use or mental health problems. .Clt reports desire to exercise for self care. Clt reports calling group peer and finding it very helpful. Clt receiving MAT/Vivitrol inj for cravings.    Dmension 6: Recovery Environment -   Previous Dimension Rating:  3  Current Dimension Ratin  Family Involvement -   Summarize attendance at family groups and family sessions - None  Family " supportive of treatment?  Client reports family is supportive    Community support group attendance - None  Recreational activities - Gardening and walking  Narrative -  Engaged in structured, meaningful activity. Client reports reconnecting with sober support and sponsor. Client reports full time employment.    Justification for Continued Treatment at this Level of Care:   displays some vulnerability for further substance use or mental health problems  Discharge Planning:  Target Discharge Date/Timeframe:  8/19/21   Med Mgmt Provider/Appt:     Ind therapy Provider/Appt:     Family therapy Provider/Appt:          Has vulnerable adult status change? No    Service Coordination:  NA    Supervision:  NA    Are Treatment Plan goals/objectives effective? Yes  *If no, list changes to treatment plan:    Are the current goals meeting client's needs? Yes  *If no, list the changes to treatment plan.    Client Input / Response: NA      *Client agrees with any changes to the treatment plan: N/A  *Client received copy of changes: Yes  *Client is aware of right to access a treatment plan review: Yes

## 2021-07-20 ENCOUNTER — HOSPITAL ENCOUNTER (OUTPATIENT)
Dept: BEHAVIORAL HEALTH | Facility: CLINIC | Age: 56
End: 2021-07-20
Attending: FAMILY MEDICINE
Payer: COMMERCIAL

## 2021-07-20 DIAGNOSIS — F10.21 ALCOHOL DEPENDENCE IN REMISSION (H): ICD-10-CM

## 2021-07-20 PROCEDURE — H2035 A/D TX PROGRAM, PER HOUR: HCPCS | Mod: HQ,GT

## 2021-07-21 NOTE — PROGRESS NOTES
[facility] Weekly Treatment Plan Review      ATTENDANCE for the following date span:  Monday, 21 thru 21    Day Tuesday   Group Therapy 2.0 hours   Individual Therapy    Family Therapy    Other (Specify)        No absences this week.    Weekly Treatment Plan Review     Treatment Plan initiated on: 21.    Dimension1: Acute Intoxication/Withdrawal Potential -   Previous Dimension Ratin  Current Dimension Ratin  Date of Last Use 21  Any reports of withdrawal symptoms - No      Dimension 2: Biomedical Conditions & Complications -   Previous Dimension Ratin  Current Dimension Ratin  Medical Concerns:    Current Medications & Medication Changes:  Current Outpatient Medications   Medication     ACCU-CHEK GUIDE test strip     acetaminophen (TYLENOL) 325 MG tablet     allopurinol (ZYLOPRIM) 100 MG tablet     allopurinol (ZYLOPRIM) 100 MG tablet     blood glucose (ACCU-CHEK GUIDE) test strip     blood glucose monitoring (ACCU-CHEK ERIKA PLUS) meter device kit     blood glucose monitoring (SOFTCLIX) lancets     blood glucose monitoring (SOFTCLIX) lancets     Blood Glucose Monitoring Suppl (ACCU-CHEK GUIDE) w/Device KIT     buPROPion (WELLBUTRIN XL) 150 MG 24 hr tablet     calcium carbonate 500 mg, elemental, (OSCAL 500) 1250 (500 Ca) MG TABS tablet     darbepoetin veronika (ARANESP) 40 MCG/ML injection     ergocalciferol (ERGOCALCIFEROL) 1.25 MG (60729 UT) capsule     furosemide (LASIX) 80 MG tablet     furosemide (LASIX) 80 MG tablet     gabapentin (NEURONTIN) 100 MG capsule     gabapentin (NEURONTIN) 100 MG capsule     lactase (LACTAID) 3000 UNIT tablet     multivitamin RENAL (NEPHROCAPS/TRIPHROCAPS) 1 MG capsule     No current facility-administered medications for this encounter.     [place]  [facility] [place]  Taking meds as prescribed? Yes  Medication side effects or concerns:  NA  Outside medical appointments this week (list provider and reason for visit):  Dialysis and  "vivitrol    Dimension 3: Emotional/Behavioral Conditions & Complications -   Previous Dimension Ratin  Current Dimension Ratin  PHQ9     PHQ-9 SCORE 3/22/2021   PHQ-9 Total Score 9     GAD7     PILI-7 SCORE 3/22/2021   Total Score 10     Mental health diagnosis Patient reported the following previous mental health diagnoses: \"depression and anxiety\".    Date of last SIB:  NA  Date of  last SI:  NA  Date of last HI: NA  Behavioral Targets:  increase impulse control and  coping skills.   Current  Assignments:  Mindfulness and 478 Breathing    Narrative:   Has impulse control and coping skills. Clt report opportunities to engage in \"fun\" with neighborhood kids.Cleint reports therapist appt.     Diension 4: Treatment Acceptance / Resistance -   Previous Dimension Ratin  Current Dimension Ratin  ALEXA Diagnosis:  Alcohol Use Disorder   303.90 (F10.20) Severe   Commitment to tx process/Stage of change- Action  ALEXA assignments - Recovery Care Planninng    Narrative -    Displays verbal compliance, but lacks consistent behaviors. Reprts he was reconnecting with sponsor and sober support. Reports looking for another  mtg due to conflict with [facility] [employer] [facility]. Has sponsor.       Dimension 5: Relapse / Continued Problem Potential -   Previous Dimension Rating: 3  Current Dimension Ratin  Relapses this week - None  Urges to use - None  UA results - None taken this week  Narrative- Recognizes relapse issues and prevention strategies, but displays some vulnerability for further substance use or mental health problems. .Clt reports desire to exercise for self care.  Clt receiving MAT/Vivitrol inj for cravings.    Dmension 6: Recovery Environment -   Previous Dimension Rating:  3  Current Dimension Ratin  Family Involvement -   Summarize attendance at family groups and family sessions - None  Family supportive of treatment?  Client reports family is supportive    Community support group attendance " - None  Recreational activities - Gardening and walking  Narrative -  Engaged in structured, meaningful activity. Client reports reconnecting with sober support and sponsor. Client reports full time employment.    Justification for Continued Treatment at this Level of Care:   displays some vulnerability for further substance use or mental health problems  Discharge Planning:  Target Discharge Date/Timeframe:  8/19/21   Med Mgmt Provider/Appt:     Ind therapy Provider/Appt:     Family therapy Provider/Appt:      Has vulnerable adult status change? No    Service Coordination:  NA    Supervision:  NA    Are Treatment Plan goals/objectives effective? Yes  *If no, list changes to treatment plan:    Are the current goals meeting client's needs? Yes  *If no, list the changes to treatment plan.    Client Input / Response: NA      *Client agrees with any changes to the treatment plan: N/A  *Client received copy of changes: Yes  *Client is aware of right to access a treatment plan review: Yes

## 2021-07-21 NOTE — GROUP NOTE
Group Therapy Documentation    PATIENT'S NAME: Monty Cox  MRN:   [id number]  :   1965  ACCT. NUMBER: [id number]  DATE OF SERVICE: 21  START TIME:  5:30 PM  END TIME:  7:30 PM  FACILITATOR(S): Javier Locke LADC  TOPIC: BEH Group Therapy  Number of patients attending the group:  6    Group Length:  2 Hours    Group Therapy Type: Addiction and Life skill(s)    Summary of Group / Topics Discussed:    Balanced Lifestyle , Coping Skills/Lifestyle Managemet, Emotional Regulation, Meditation/Breathing Exercises, Self-Care Activities, and Symptom Management  Recovery Care Assignment presentation - what is my plan for sustaining sobriety and recovery after ALEXA therapy is complete.         Group Attendance:  Attended group session    Patient's response to the group topic/interactions:  cooperative with task    Patient appeared to be Attentive and Engaged.        Client specific details:  Client shared he had dialysis this past week, had 1:1 therapist appt. This week. Client shared he did not attend outside support this last week, practiced breathing for recovery skill. Dimension 3, 4, 5.   Telemedicine Visit: The patient's condition can be safely assessed and treated via synchronous audio and visual telemedicine encounter.      Reason for Telemedicine Visit: Patient has requested telehealth visit    Originating Site (Patient Location): Patient's home    Distant Site (Provider Location): [facility] Clinics: [facility]    Consent:  The patient/guardian has verbally consented to: the potential risks and benefits of telemedicine (video visit) versus in person care; bill my insurance or make self-payment for services provided; and responsibility for payment of non-covered services.     Mode of Communication:  Video Conference via [employer]    As the provider I attest to compliance with applicable laws and regulations related to telemedicine.

## 2021-07-21 NOTE — ADDENDUM NOTE
Encounter addended by: [facility]avier Locke [facility] on: 7/21/2021 4:22 PM   Actions taken: Clinical Note Signed

## 2021-07-27 ENCOUNTER — HOSPITAL ENCOUNTER (OUTPATIENT)
Dept: BEHAVIORAL HEALTH | Facility: CLINIC | Age: 56
End: 2021-07-27
Attending: FAMILY MEDICINE
Payer: COMMERCIAL

## 2021-07-27 PROCEDURE — H2035 A/D TX PROGRAM, PER HOUR: HCPCS | Mod: HQ,GT

## 2021-07-27 NOTE — GROUP NOTE
Group Therapy Documentation    PATIENT'S NAME: Monty Cox  MRN:   [id number]  :   1965  [id number]  DATE OF SERVICE: 21  START TIME:  5:30 PM  END TIME:  7:30 PM  FACILITATOR(S): Hermelindo Mann LADC  TOPIC: BEH Group Therapy  Number of patients attending the group:  4  Group Length:  2 Hours    Group Therapy Type: Addiction    Summary of Group / Topics Discussed:    Choices in Recovery: Balanced Lifestyle, Coping Skills/Lifestyle Management, Choices in Recovery, and Self-Care Activities and plan for continuing recovery.   Client participated with the check-in process and addressed any issues.  Client participated in a natural breathing exercise.  Recovery Planning was addressed through group members' presentations of their  Recovery Care Plan  assignments with feedback and discussion.  Client expressed something they were grateful for today.    Telemedicine Visit: The patient's condition can be safely assessed and treated via synchronous audio and visual telemedicine encounter.      Reason for Telemedicine Visit: Services only offered telehealth    Originating Site (Patient Location): Patient's home    Distant Site (Provider Location): Provider Remote Setting- Home Office    Consent:  The patient/guardian has verbally consented to: the potential risks and benefits of telemedicine (video visit) versus in person care; bill my insurance or make self-payment for services provided; and responsibility for payment of non-covered services.     Mode of Communication:  Video Conference via [employer]    As the provider I attest to compliance with applicable laws and regulations related to telemedicine.       Group Attendance:  Attended group session    Patient's response to the group topic/interactions:  cooperative with task    Patient appeared to be Engaged.        Client specific details:  .Clt shared he is still loking to find additional AA mtgs he feels comfortable attending. He said  he is becoming more sociable again and enjoying these activities more now that he is sober and not sneaking drinks and fighting with his wife. He shared in group that he is getting momentum with his desire to exercise more and is feeling much better mentally and physically.

## 2021-07-29 NOTE — ADDENDUM NOTE
Encounter addended by: Hermelindo Mann LADC on: 7/29/2021 12:21 PM   Actions taken: Clinical Note Signed

## 2021-07-29 NOTE — PROGRESS NOTES
[facility] Weekly Treatment Plan Review      ATTENDANCE for the following date span:  Monday, 21 thru 21    Day Tuesday   Group Therapy 2.0 hours   Individual Therapy    Family Therapy    Other (Specify)        No absences this week.    Weekly Treatment Plan Review     Treatment Plan initiated on: 21.    Dimension1: Acute Intoxication/Withdrawal Potential -   Previous Dimension Ratin  Current Dimension Ratin  Date of Last Use 21  Any reports of withdrawal symptoms - No      Dimension 2: Biomedical Conditions & Complications -   Previous Dimension Ratin  Current Dimension Ratin  Medical Concerns:    Current Medications & Medication Changes:  Current Outpatient Medications   Medication     ACCU-CHEK GUIDE test strip     acetaminophen (TYLENOL) 325 MG tablet     allopurinol (ZYLOPRIM) 100 MG tablet     allopurinol (ZYLOPRIM) 100 MG tablet     blood glucose (ACCU-CHEK GUIDE) test strip     blood glucose monitoring (ACCU-CHEK ERIKA PLUS) meter device kit     blood glucose monitoring (SOFTCLIX) lancets     blood glucose monitoring (SOFTCLIX) lancets     Blood Glucose Monitoring Suppl (ACCU-CHEK GUIDE) w/Device KIT     buPROPion (WELLBUTRIN XL) 150 MG 24 hr tablet     calcium carbonate 500 mg, elemental, (OSCAL 500) 1250 (500 Ca) MG TABS tablet     darbepoetin veronika (ARANESP) 40 MCG/ML injection     ergocalciferol (ERGOCALCIFEROL) 1.25 MG (63836 UT) capsule     furosemide (LASIX) 80 MG tablet     furosemide (LASIX) 80 MG tablet     gabapentin (NEURONTIN) 100 MG capsule     gabapentin (NEURONTIN) 100 MG capsule     lactase (LACTAID) 3000 UNIT tablet     multivitamin RENAL (NEPHROCAPS/TRIPHROCAPS) 1 MG capsule     No current facility-administered medications for this encounter.     Medication Prescriber[place]  [facility] [place]  Taking meds as prescribed? Yes  Medication side effects or concerns:  NA  Outside medical appointments this week (list provider and reason for visit):  Dialysis and  "vivitrol    Dimension 3: Emotional/Behavioral Conditions & Complications -   Previous Dimension Ratin  Current Dimension Ratin  PHQ9     PHQ-9 SCORE 3/22/2021   PHQ-9 Total Score 9     GAD7     PILI-7 SCORE 3/22/2021   Total Score 10     Mental health diagnosis Patient reported the following previous mental health diagnoses: \"depression and anxiety\".    Date of last SIB:  NA  Date of  last SI:  NA  Date of last HI: NA  Behavioral Targets:  increase impulse control and  coping skills.   Current  Assignments:  Mindfulness and 478 Breathing    Narrative:   Has impulse control and coping skills. Clt report opportunities to engage in \"fun\" with neighborhood kids.Cleint reports therapist appt.     Diension 4: Treatment Acceptance / Resistance -   Previous Dimension Ratin  Current Dimension Ratin  ALEXA Diagnosis:  Alcohol Use Disorder   303.90 (F10.20) Severe   Commitment to tx process/Stage of change- Action  ALEXA assignments - Recovery Care Planninng    Narrative -    Displays verbal compliance, but lacks consistent behaviors. Reprts he was reconnecting with sponsor and sober support. Reports looking for another  mtg due to conflict with Tuesday [employer] group. Has sponsor.       Dimension 5: Relapse / Continued Problem Potential -   Previous Dimension Rating: 3  Current Dimension Ratin  Relapses this week - None  Urges to use - None  UA results - None taken this week  Narrative- Recognizes relapse issues and prevention strategies, but displays some vulnerability for further substance use or mental health problems. .Clt reports desire to exercise for self care.  Clt receiving MAT/Vivitrol inj for cravings. Clt reports exercising more and having fun socially with his wife.    Dmension 6: Recovery Environment -   Previous Dimension Rating:  3  Current Dimension Ratin  Family Involvement -   Summarize attendance at family groups and family sessions - None  Family supportive of treatment?  Client " reports family is supportive    Community support group attendance - None  Recreational activities - Gardening and walking  Narrative -  Engaged in structured, meaningful activity. Client reports reconnecting with sober support and sponsor. Client reports full time employment. Clt need to increase level of recovery activity and attend add'l sober support.    Justification for Continued Treatment at this Level of Care:   displays some vulnerability for further substance use or mental health problems  Discharge Planning:  Target Discharge Date/Timeframe:  8/19/21   Med Mgmt Provider/Appt:     Ind therapy Provider/Appt:     Family therapy Provider/Appt:      Has vulnerable adult status change? No    Service Coordination:  NA    Supervision:  NA    Are Treatment Plan goals/objectives effective? Yes  *If no, list changes to treatment plan:    Are the current goals meeting client's needs? Yes  *If no, list the changes to treatment plan.    Client Input / Response: NA      *Client agrees with any changes to the treatment plan: N/A  *Client received copy of changes: Yes  *Client is aware of right to access a treatment plan review: Yes

## 2021-08-03 ENCOUNTER — HOSPITAL ENCOUNTER (OUTPATIENT)
Dept: BEHAVIORAL HEALTH | Facility: CLINIC | Age: 56
End: 2021-08-03
Attending: FAMILY MEDICINE
Payer: COMMERCIAL

## 2021-08-03 PROCEDURE — H2035 A/D TX PROGRAM, PER HOUR: HCPCS | Mod: HQ,GT

## 2021-08-03 NOTE — GROUP NOTE
Group Therapy Documentation    PATIENT'S NAME: Monty Cox  MRN:   [id number]  :   1965  [id number]  DATE OF SERVICE: 21  START TIME:  5:30 PM  END TIME:  7:30 PM  FACILITATOR(S): Hermelindo Mann LADC  TOPIC: BEH Group Therapy  Number of patients attending the group:  4  Group Length:  2 Hours    Group Therapy Type: Addiction    Summary of Group / Topics Discussed:    Psychoeducation/Skills Goal Setting (ALEXA)  This topic will give a general overview of short, intermediate and long term goals including the value of goal setting. It will explore how the pursuit of instant gratification adversely affects the ability to reach goals.  This topic will assist the patients in completing their recovery care plans.    Objective(s):     Patients will identify the characteristics of short, intermediate and long term goals.    Patients will be able to list one personal goal under each category    Patients will identify at least one way instant gratification impacts their ability to reach goals    Structure (modalities, homework, worksheets, etc):     Provide psychoeducation on goal setting and overcoming obstacles to goal attainment.    Facilitate group discussion around each patient s current state of goal setting and attainment.    Complete a worksheet on personal goal setting    Expected therapeutic outcome(s):   Patient will:    Set goals and negotiate obstacles to reaching the goals     Experience a higher incidence of goal attainment    Therapeutic outcome(s) measured by:     Completion of goal setting worksheet      Telemedicine Visit: The patient's condition can be safely assessed and treated via synchronous audio and visual telemedicine encounter.      Reason for Telemedicine Visit: Services only offered telehealth    Originating Site (Patient Location): Patient's home    Distant Site (Provider Location): Provider Remote Setting- Home Office    Consent:  The patient/guardian has verbally  consented to: the potential risks and benefits of telemedicine (video visit) versus in person care; bill my insurance or make self-payment for services provided; and responsibility for payment of non-covered services.     Mode of Communication:  Video Conference via [employer]    As the provider I attest to compliance with applicable laws and regulations related to telemedicine.        Group Attendance:  Attended group session    Patient's response to the group topic/interactions:  expressed readiness to alter behaviors    Patient appeared to be Actively participating.        Client specific details:  Client participated in the check-in process and reported no change in sobriety date. Client participated in group discussion of developing goals in recovery. He shared that health is also a goal for him in recovery and that through recovery, he can get off dialysis. He ihopes to regain his physical health through self care and exercise in order to start enjoying life. This weekend he and his wife are camping on the [facility]. his relate to clt D5 and D6 tx plan goals..

## 2021-08-05 NOTE — PROGRESS NOTES
[facility] Weekly Treatment Plan Review      ATTENDANCE for the following date span:  21 through 21.      Day Tuesday   Group Therapy 2.0 hours   Individual Therapy    Family Therapy    Other (Specify)        No absences this week.    Weekly Treatment Plan Review     Treatment Plan initiated on: 21.    Dimension1: Acute Intoxication/Withdrawal Potential -   Previous Dimension Ratin  Current Dimension Ratin  Date of Last Use 21  Any reports of withdrawal symptoms - No      Dimension 2: Biomedical Conditions & Complications -   Previous Dimension Ratin  Current Dimension Ratin  Medical Concerns:    Current Medications & Medication Changes:  Current Outpatient Medications   Medication     ACCU-CHEK GUIDE test strip     acetaminophen (TYLENOL) 325 MG tablet     allopurinol (ZYLOPRIM) 100 MG tablet     allopurinol (ZYLOPRIM) 100 MG tablet     blood glucose (ACCU-CHEK GUIDE) test strip     blood glucose monitoring (ACCU-CHEK ERIKA PLUS) meter device kit     blood glucose monitoring (SOFTCLIX) lancets     blood glucose monitoring (SOFTCLIX) lancets     Blood Glucose Monitoring Suppl (ACCU-CHEK GUIDE) w/Device KIT     buPROPion (WELLBUTRIN XL) 150 MG 24 hr tablet     calcium carbonate 500 mg, elemental, (OSCAL 500) 1250 (500 Ca) MG TABS tablet     darbepoetin veronika (ARANESP) 40 MCG/ML injection     ergocalciferol (ERGOCALCIFEROL) 1.25 MG (68967 UT) capsule     furosemide (LASIX) 80 MG tablet     furosemide (LASIX) 80 MG tablet     gabapentin (NEURONTIN) 100 MG capsule     gabapentin (NEURONTIN) 100 MG capsule     lactase (LACTAID) 3000 UNIT tablet     multivitamin RENAL (NEPHROCAPS/TRIPHROCAPS) 1 MG capsule     No current facility-administered medications for this encounter.     [place]  [facility] [place]  Taking meds as prescribed? Yes  Medication side effects or concerns:  NA  Outside medical appointments this week (list provider and reason for visit):  Dialysis  "and vivitrol    Dimension 3: Emotional/Behavioral Conditions & Complications -   Previous Dimension Ratin  Current Dimension Ratin  PHQ9     PHQ-9 SCORE 3/22/2021   PHQ-9 Total Score 9     GAD7     PILI-7 SCORE 3/22/2021   Total Score 10     Mental health diagnosis Patient reported the following previous mental health diagnoses: \"depression and anxiety\".    Date of last SIB:  NA  Date of  last SI:  NA  Date of last HI: NA  Behavioral Targets:  increase impulse control and  coping skills.   Current  Assignments:  Mindfulness and 478 Breathing    Narrative:   Has impulse control and coping skills. Clt report opportunities to engage in \"fun\" with neighborhood kids.Cleint reports therapist appt.     Diension 4: Treatment Acceptance / Resistance -   Previous Dimension Ratin  Current Dimension Ratin  ALEXA Diagnosis:  Alcohol Use Disorder   303.90 (F10.20) Severe   Commitment to tx process/Stage of change- Action  ALEXA assignments - Recovery Care Planninng    Narrative -    Displays verbal compliance, but lacks consistent behaviors. Reprts he was reconnecting with sponsor and sober support. Reports looking for another  mtg due to conflict with Tuesday [employer] group. Has sponsor.       Dimension 5: Relapse / Continued Problem Potential -   Previous Dimension Rating: 3  Current Dimension Ratin  Relapses this week - None  Urges to use - None  UA results - None taken this week  Narrative- Recognizes relapse issues and prevention strategies, but displays some vulnerability for further substance use or mental health problems. .Clt reports desire to exercise for self care.  Clt receiving MAT/Vivitrol inj for cravings. Clt reports exercising more and having fun socially with his wife. Camping this weekend on [facility].    Dmension 6: Recovery Environment -   Previous Dimension Rating:  3  Current Dimension Ratin  Family Involvement -   Summarize attendance at family groups and family sessions - " None  Family supportive of treatment?  Client reports family is supportive    Community support group attendance - None  Recreational activities - Gardening and walking  Narrative -  Engaged in structured, meaningful activity. Client reports reconnecting with sober support and sponsor. Client reports full time employment. Clt need to increase level of recovery activity and attend add'l sober support.    Justification for Continued Treatment at this Level of Care:   displays some vulnerability for further substance use or mental health problems  Discharge Planning:  Target Discharge Date/Timeframe:  8/19/21   Med Mgmt Provider/Appt:     Ind therapy Provider/Appt:     Family therapy Provider/Appt:      Has vulnerable adult status change? No    Service Coordination:  NA    Supervision:  NA    Are Treatment Plan goals/objectives effective? Yes  *If no, list changes to treatment plan:    Are the current goals meeting client's needs? Yes  *If no, list the changes to treatment plan.    Client Input / Response: NA      *Client agrees with any changes to the treatment plan: N/A  *Client received copy of changes: Yes  *Client is aware of right to access a treatment plan review: Yes

## 2021-08-05 NOTE — ADDENDUM NOTE
Encounter addended by: Hermelindo Mann LADC on: 8/5/2021 1:35 PM   Actions taken: Clinical Note Signed

## 2021-08-24 ENCOUNTER — HOSPITAL ENCOUNTER (OUTPATIENT)
Dept: BEHAVIORAL HEALTH | Facility: CLINIC | Age: 56
End: 2021-08-24
Attending: FAMILY MEDICINE
Payer: COMMERCIAL

## 2021-08-24 PROCEDURE — H2035 A/D TX PROGRAM, PER HOUR: HCPCS | Mod: HQ,95

## 2021-08-24 NOTE — GROUP NOTE
"Group Therapy Documentation    PATIENT'S NAME: Monty Cox  MRN:   [id number]  :   1965  AC[employer]. NUMBER: [id number]  DATE OF SERVICE: 21  START TIME:  5:30 PM  END TIME:  7:30 PM  FACILITATOR(S): Hermelindo Mann LADC  TOPIC: BEH Group Therapy  Number of patients attending the group:  5  Group Length:  2 Hours    Group Therapy Type: Life skill(s)    Summary of Group / Topics Discussed:    Coping Skills/Lifestyle Managemet and Mindfulness:  Positive Psychology, D3, 5 and 6: Client watched part video  Happiness  and participated in group discussion about the topics \"Mindfulness\", positive psychology  and benefits of having a sober support network.    Telemedicine Visit: The patient's condition can be safely assessed and treated via synchronous audio and visual telemedicine encounter.      Reason for Telemedicine Visit: Services only offered telehealth    Originating Site (Patient Location): Patient's home    Distant Site (Provider Location): Provider Remote Setting- Home Office    Consent:  The patient/guardian has verbally consented to: the potential risks and benefits of telemedicine (video visit) versus in person care; bill my insurance or make self-payment for services provided; and responsibility for payment of non-covered services.     Mode of Communication:  Video Conference via [employer]    As the provider I attest to compliance with applicable laws and regulations related to telemedicine.        Group Attendance:  Attended group session    Patient's response to the group topic/interactions:  cooperative with task    Patient appeared to be Attentive.        Client specific details:  Clients participated in introductions and followed by a brief meditation on gratitude and a reading from the book 24 Hours. A short discussion followed each where clients had the opportunity to share thoughts. Clt shared he attended a virtual meeting this week got some exercise and practiced deep breathing. Clt " said he is struggling with getting real structure in his life and peers processed how they  Write notes or write things on their calender that seems to require a level of commitment that makes it more likely they will do something. Client watched a portion of Happiness video and Identified spending time with people he loved as something they could do to support their recovery and specifically to manage negative thoughts and how sober support groups are a good place to vent.

## 2021-08-26 NOTE — ADDENDUM NOTE
Encounter addended by: Hermelindo Mann LADC on: 8/26/2021 1:03 PM   Actions taken: Clinical Note Signed

## 2021-08-26 NOTE — PROGRESS NOTES
[facility] Weekly Treatment Plan Review      ATTENDANCE for the following date span:  21 through 21.      Day Tuesday   Group Therapy 2 hours   Individual Therapy    Family Therapy    Other (Specify)        Patient did not have any absences during this period..    Weekly Treatment Plan Review No udates as clt did not attend group this week.     Treatment Plan initiated on: 21.    Dimension1: Acute Intoxication/Withdrawal Potential -   Previous Dimension Ratin  Current Dimension Ratin  Date of Last Use 21  Any reports of withdrawal symptoms - No      Dimension 2: Biomedical Conditions & Complications -   Previous Dimension Ratin  Current Dimension Ratin  Medical Concerns:    Current Medications & Medication Changes:  Current Outpatient Medications   Medication     ACCU-CHEK GUIDE test strip     acetaminophen (TYLENOL) 325 MG tablet     allopurinol (ZYLOPRIM) 100 MG tablet     allopurinol (ZYLOPRIM) 100 MG tablet     blood glucose (ACCU-CHEK GUIDE) test strip     blood glucose monitoring (ACCU-CHEK ERIKA PLUS) meter device kit     blood glucose monitoring (SOFTCLIX) lancets     blood glucose monitoring (SOFTCLIX) lancets     Blood Glucose Monitoring Suppl (ACCU-CHEK GUIDE) w/Device KIT     buPROPion (WELLBUTRIN XL) 150 MG 24 hr tablet     calcium carbonate 500 mg, elemental, (OSCAL 500) 1250 (500 Ca) MG TABS tablet     darbepoetin veronika (ARANESP) 40 MCG/ML injection     ergocalciferol (ERGOCALCIFEROL) 1.25 MG (40325 UT) capsule     furosemide (LASIX) 80 MG tablet     furosemide (LASIX) 80 MG tablet     gabapentin (NEURONTIN) 100 MG capsule     gabapentin (NEURONTIN) 100 MG capsule     lactase (LACTAID) 3000 UNIT tablet     multivitamin RENAL (NEPHROCAPS/TRIPHROCAPS) 1 MG capsule     No current facility-administered medications for this encounter.     Medication Prescriber:  [facility] [place]  Taking meds as prescribed? Yes  Medication side effects or concerns:  NA  Outside  "medical appointments this week (list provider and reason for visit):  Dialysis and vivitrol    Dimension 3: Emotional/Behavioral Conditions & Complications -   Previous Dimension Ratin  Current Dimension Ratin  PHQ9     PHQ-9 SCORE 3/22/2021   PHQ-9 Total Score 9     GAD7     PILI-7 SCORE 3/22/2021   Total Score 10     Mental health diagnosis Patient reported the following previous mental health diagnoses: \"depression and anxiety\".    Date of last SIB:  NA  Date of  last SI:  NA  Date of last HI: NA  Behavioral Targets:  increase impulse control and  coping skills.   Current MH Assignments:  Mindfulness and 478 Breathing    Narrative:   Has impulse control and coping skills. Clt report inceased levels of stress. Clt reports no effective coping skills to manage stress. Client agreed he needs to schedule exercise and meditation to be sure it gets done.    Diension 4: Treatment Acceptance / Resistance -   Previous Dimension Ratin  Current Dimension Ratin  ALEXA Diagnosis:  Alcohol Use Disorder   303.90 (F10.20) Severe   Commitment to tx process/Stage of change- Action  ALEXA assignments - Recovery Care Planninng    Narrative -    Displays verbal compliance, but lacks consistent behaviors. Reprts he was reconnecting with sponsor and sober support. Reports looking for another AA mtg due to conflict with Tuesday night group. 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 Clt requested extension of programming for 5 sessions beyond .       Dimension 5: Relapse / Continued Problem Potential -   Previous Dimension Rating: 3  Current Dimension Ratin  Relapses this week - None  Urges to use - None  UA results - None taken this week  Narrative- Recognizes relapse issues and prevention strategies, but displays some vulnerability for further substance use or mental health problems. .Clt reports desire to exercise for self care.  Clt receiving MAT/Vivitrol inj for cravings. Clt reports exercising more and having " fun socially with his wife. Camping this weekend on [facility]. Clt reported brief return to use episode on . He called group peers and counselor to self report. Client completed his relapse autopsy assignment in group.    Dmension 6: Recovery Environment -   Previous Dimension Rating:  3  Current Dimension Ratin  Family Involvement -   Summarize attendance at family groups and family sessions - None  Family supportive of treatment?  Client reports family is supportive    Community support group attendance - None  Recreational activities - Gardening and walking  Narrative -  Engaged in structured, meaningful activity. Client reports reconnecting with sober support and sponsor. Client reports full time employment. Clt need to increase level of recovery activity and attend add'l sober support.    Justification for Continued Treatment at this Level of Care:   displays some vulnerability for further substance use or mental health problems  Discharge Planning:  Target Discharge Date/Timeframe:  21   Med Mgmt Provider/Appt:     Ind therapy Provider/Appt:     Family therapy Provider/Appt:      Has vulnerable adult status change? No    Service Coordination:  NA    Supervision:  NA    Are Treatment Plan goals/objectives effective? Yes  *If no, list changes to treatment plan:    Are the current goals meeting client's needs? Yes  *If no, list the changes to treatment plan.    Client Input / Response: NA      *Client agrees with any changes to the treatment plan: N/A  *Client received copy of changes: Yes  *Client is aware of right to access a treatment plan review: Yes

## 2021-08-26 NOTE — ADDENDUM NOTE
Encounter addended by: Hermelindo Mann LADC on: 8/26/2021 12:53 PM   Actions taken: Clinical Note Signed

## 2021-08-31 ENCOUNTER — HOSPITAL ENCOUNTER (OUTPATIENT)
Dept: BEHAVIORAL HEALTH | Facility: CLINIC | Age: 56
End: 2021-08-31
Attending: FAMILY MEDICINE
Payer: COMMERCIAL

## 2021-08-31 PROCEDURE — H2035 A/D TX PROGRAM, PER HOUR: HCPCS | Mod: HQ,95

## 2021-09-01 NOTE — GROUP NOTE
"Group Therapy Documentation    PATIENT'S NAME: Monyt Cox  MRN:   [id number]  :   1965  ACCT. NUMBER: [id number]  DATE OF SERVICE: 21  START TIME:  5:30 PM  END TIME:  7:30 PM  FACILITATOR(S): Valentin Cox  TOPIC: BEH Group Therapy  Number of patients attending the group:  5  Group Length:  2 Hours    Group Therapy Type: Addiction and Psychotherapeutic    Summary of Group / Topics Discussed:    Coping Skills/Lifestyle Managemet and Mindfulness:  Client watched video \"Why is Mindfulness a Super Power\" and practiced a body scan meditation and shared how it was for them and what they do for relaxation. Client checked in and  participated in group discussion about the topics \"Mindfulness Meditation\" dealing with anger and benefits of having a sober support network.    Telemedicine Visit: The patient's condition can be safely assessed and treated via synchronous audio and visual telemedicine encounter.      Reason for Telemedicine Visit: Services only offered telehealth     Originating Site (Patient Location): Patient's home     Distant Site (Provider Location): [facility]: [place]      Consent:  The patient/guardian has verbally consented to: the potential risks and benefits of telemedicine (video visit) versus in person care; bill my insurance or make self-payment for services provided; and responsibility for payment of non-covered services.      Mode of Communication:  Video Conference via Zoom     As the provider I attest to compliance with applicable laws and regulations related to telemedicine.       Group Attendance:  Attended group session    Patient's response to the group topic/interactions:  discussed personal experience with topic, expressed readiness to alter behaviors and expressed understanding of topic    Patient appeared to be Engaged.        Client specific details:  In this session client checked in reporting ongoing sobriety and that he is having no withdrawal " "symptoms. He then practiced a body scan meditaion exercise and shared that he found he was \"reminded how tense I am and after the meditation I feel relaxed.\" He shared that outside of group he practices doing breathing exercises to reduce stress.   Client shared that he is aware of the  benefits he finds from doing doing breathing and meditation exercises and he regrets \"I don't do them enough.\" He shared he already sees them as something he wants to do more of because he \"wants to add this to his life.\" In this session client worked on his Dimension 5 treatment plan goal of \"Develop sober coping and living skills in order to address the development of a strategy for long term recovery.\" He reports attending a noon AA meeting this past Monday for sober support. He reports he is grateful for \"supportive friends and family as they are a great help, always being there.\"         Plan: Do a daily stress reduction practice of mindfulness, breathing exercise or a grounding exercise and share if this helped with your sense of well being or a reduction in cravings.       "

## 2021-09-04 NOTE — PROGRESS NOTES
"[facility] Weekly Treatment Plan Review      ATTENDANCE for the following date span: Monday:  21 through 21    Day Tuesday   Group Therapy 2 hours   Individual Therapy    Family Therapy    Other (Specify)        Patient did not have any absences during this period..    Weekly Treatment Plan Review      Treatment Plan initiated on: 21.     Dimension1: Acute Intoxication/Withdrawal Potential -   Previous Dimension Ratin  Current Dimension Ratin  Date of Last Use 2021 Client self report on 2021 was DOLU \"2 weeks ago\".   Any reports of withdrawal symptoms - No      Dimension 2: Biomedical Conditions & Complications -   Previous Dimension Ratin  Current Dimension Ratin  Medical Concerns:   Dialysis and vivitrol  Current Medications & Medication Changes:  Current Outpatient Medications   Medication     ACCU-CHEK GUIDE test strip     acetaminophen (TYLENOL) 325 MG tablet     allopurinol (ZYLOPRIM) 100 MG tablet     allopurinol (ZYLOPRIM) 100 MG tablet     blood glucose (ACCU-CHEK GUIDE) test strip     blood glucose monitoring (ACCU-CHEK ERIKA PLUS) meter device kit     blood glucose monitoring (SOFTCLIX) lancets     blood glucose monitoring (SOFTCLIX) lancets     Blood Glucose Monitoring Suppl (ACCU-CHEK GUIDE) w/Device KIT     buPROPion (WELLBUTRIN XL) 150 MG 24 hr tablet     calcium carbonate 500 mg, elemental, (OSCAL 500) 1250 (500 Ca) MG TABS tablet     darbepoetin veronika (ARANESP) 40 MCG/ML injection     ergocalciferol (ERGOCALCIFEROL) 1.25 MG (58685 UT) capsule     furosemide (LASIX) 80 MG tablet     furosemide (LASIX) 80 MG tablet     gabapentin (NEURONTIN) 100 MG capsule     gabapentin (NEURONTIN) 100 MG capsule     lactase (LACTAID) 3000 UNIT tablet     multivitamin RENAL (NEPHROCAPS/TRIPHROCAPS) 1 MG capsule     No current facility-administered medications for this encounter.     [place]ication Presc[place]  [facility] [place] meds as prescribed? Yes  Medication side " "effects or concerns:  NA  Outside medical appointments this week (list provider and reason for visit):  Dialysis and vivitrol    Client reports no new or worsening health concerns.    Dimension 3: Emotional/Behavioral Conditions & Complications -   Previous Dimension Ratin  Current Dimension Ratin  PHQ9     PHQ-9 SCORE 3/22/2021   PHQ-9 Total Score 9     GAD7     PILI-7 SCORE 3/22/2021   Total Score 10     Mental health diagnosis Patient reported the following previous mental health diagnoses: \"depression and anxiety\".    Date of last SIB:  NA  Date of  last SI:  NA  Date of last HI: NA  Behavioral Targets:  increase impulse control and  coping skills.   Current MH Assignments:  Mindfulness and 478 Breathing    Narrative:   Has impulse control and coping skills. Clt report inceased levels of stress. Clt reports no effective coping skills to manage stress. Client agreed he needs to schedule exercise and meditation to be sure it gets done.    Client rated his mental health concerns as 6-7 on a scale of 0 to 10, with 10 being the worst. He reports that the \"cause is stress with family members...\". He reports he uses a breathing exercise for stress relief.     Diension 4: Treatment Acceptance / Resistance -   Previous Dimension Ratin  Current Dimension Ratin  ALEXA Diagnosis:  Alcohol Use Disorder   303.90 (F10.20) Severe   Commitment to tx process/Stage of change- Preparation to action.   ALEXA assignments - Recovery Care Planninng    Narrative -    Displays verbal compliance, but lacks consistent behaviors. Reprts he was reconnecting with sponsor and sober support. Reports looking for another  mtg due to conflict with Tuesday night group. 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 Clt requested extension of programming for 5 sessions beyond .     2021 Client reports that his motivation for his recovery is a 10 on a scale of 0 to 10 with 10 being the most motivated.     Dimension 5: Relapse / " "Continued Problem Potential -   Previous Dimension Rating: 3  Current Dimension Ratin  Relapses this week - None this week. But on 2021 he had relapsed.   Urges to use - YES, List Yes, reported this is related to family stressors.   UA results - None taken this week  Narrative- Recognizes relapse issues and prevention strategies, but displays some vulnerability for further substance use or mental health problems. .Clt reports desire to exercise for self care.  Clt receiving MAT/Vivitrol inj for cravings. Clt reports exercising more and having fun socially with his wife. Camping this weekend on [facility]. Clt reported brief return to use episode on . He called group peers and counselor to self report. Client completed his relapse autopsy assignment in group.  2021 Client reported his last date of use was two weeks ago. He reports the recovery skill he is using to stay sober is playing the tape through.     Dmension 6: Recovery Environment -   Previous Dimension Rating:  3  Current Dimension Ratin  Family Involvement -   Summarize attendance at family groups and family sessions - None  Family supportive of treatment?  Client reports family is supportive meeting on 2021.     Community support group attendance - He reports having attended a noon  sober support meeting on 2021.  Recreational activities - \"My Birthday Party\".   Narrative -  Engaged in structured, meaningful activity. Client reports reconnecting with sober support and sponsor. Client reports full time employment.     Client reports having attended a sober support meeting this       Justification for Continued Treatment at this Level of Care:   displays some vulnerability for further substance use or mental health problems  Discharge Planning: TBD due to 2021 DOLU  Target Discharge Date/Timeframe:    Med Mgmt Provider/Appt:     Ind therapy Provider/Appt:     Family therapy Provider/Appt:      Has vulnerable " adult status change? No    Service Coordination:  NA    Supervision:  NA    Are Treatment Plan goals/objectives effective? Yes  *If no, list changes to treatment plan:    Are the current goals meeting client's needs? Yes  *If no, list the changes to treatment plan.    Client Input / Response: NA      *Client agrees with any changes to the treatment plan: N/A  *Client received copy of changes: Yes  *Client is aware of right to access a treatment plan review: Yes

## 2021-09-05 ENCOUNTER — HEALTH MAINTENANCE LETTER (OUTPATIENT)
Age: 56
End: 2021-09-05

## 2021-09-07 ENCOUNTER — HOSPITAL ENCOUNTER (OUTPATIENT)
Dept: BEHAVIORAL HEALTH | Facility: CLINIC | Age: 56
End: 2021-09-07
Attending: FAMILY MEDICINE
Payer: COMMERCIAL

## 2021-09-07 PROCEDURE — H2035 A/D TX PROGRAM, PER HOUR: HCPCS | Mod: HQ,GT

## 2021-09-07 NOTE — GROUP NOTE
Group Therapy Documentation    PATIENT'S NAME: Monty Cox  MRN:   [id number]  :   1965  [id number]  DATE OF SERVICE: 21  START TIME:  5:30 PM  END TIME:  7:30 PM  FACILITATOR(S): Hermelindo Mann LADC  TOPIC: BEH Group Therapy  Number of patients attending the group:  5  Group Length:  2 Hours    Group Therapy Type: Addiction    Summary of Group / Topics Discussed:    Psychoeducation/Skills Relapse Prevention (ALEXA)  This topic will give a general overview of basic relapse prevention, including definitions of warning signs, triggers and cravings and the importance of addressing healthy coping skills for ongoing relapse prevention.    Objective(s):    Patients will identify 4 key warning signs and triggers    Patients will identify 4 healthy coping mechanisms         Structure (modalities, homework, worksheets, etc.):    Provide psychoeducation on relapse prevention and healthy coping methods.    Facilitate group discussion around each patient s current awareness of warning signs,  triggers and cravings.     Expected therapeutic outcome(s):    Patient will:    Be able to manage triggers and cravings without returning to substance use.      Therapeutic outcomes measured by:    Patients will name 4 of their warning signs and triggers    Patients will name 4 healthy coping mechanisms for dealing with their warning signs and triggers  Telemedicine Visit: The patient's condition can be safely assessed and treated via synchronous audio and visual telemedicine encounter.      Reason for Telemedicine Visit: Services only offered telehealth    Originating Site (Patient Location): Patient's home    Distant Site (Provider Location): Provider Remote Setting- Home Office    Consent:  The patient/guardian has verbally consented to: the potential risks and benefits of telemedicine (video visit) versus in person care; bill my insurance or make self-payment for services provided; and responsibility for  payment of non-covered services.     Mode of Communication:  Video Conference via [employer]    As the provider I attest to compliance with applicable laws and regulations related to telemedicine.        Group Attendance:  Attended group session    Patient's response to the group topic/interactions:  cooperative with task, discussed personal experience with topic and expressed readiness to alter behaviors    Patient appeared to be Actively participating.        Client specific details:  Client Identified personal triggers and relapse warning signs and 4 coping skills to address and divert thinking. Client shared he attended AA mtg on Monday and has been successful at getting more regular exercise. He talked about reconnecting with his old sponsor.  Relates to client Dim 3 goal to gain understanding of addiction as a disease and how this disorder affects other aspects of mental and health and Dim 5 goal to Develop sober coping and living skills in order to address the development of a strategy for long term recovery..

## 2021-09-09 NOTE — PROGRESS NOTES
"[facility] Weekly Treatment Plan Review      ATTENDANCE for the following date span: 21 through 21.      Day Tuesday   Group Therapy 2 hours   Individual Therapy    Family Therapy    Other (Specify)        Patient did not have any absences during this period..    Weekly Treatment Plan Review      Treatment Plan initiated on: 21.     Dimension1: Acute Intoxication/Withdrawal Potential -   Previous Dimension Ratin  Current Dimension Ratin  Date of Last Use 2021 Client self report on 2021 was DOLU \"2 weeks ago\".   Any reports of withdrawal symptoms - No      Dimension 2: Biomedical Conditions & Complications -   Previous Dimension Ratin  Current Dimension Ratin  Medical Concerns:   Dialysis and vivitrol  Current Medications & Medication Changes:  Current Outpatient Medications   Medication     ACCU-CHEK GUIDE test strip     acetaminophen (TYLENOL) 325 MG tablet     allopurinol (ZYLOPRIM) 100 MG tablet     allopurinol (ZYLOPRIM) 100 MG tablet     blood glucose (ACCU-CHEK GUIDE) test strip     blood glucose monitoring (ACCU-CHEK ERIKA PLUS) meter device kit     blood glucose monitoring (SOFTCLIX) lancets     blood glucose monitoring (SOFTCLIX) lancets     Blood Glucose Monitoring Suppl (ACCU-CHEK GUIDE) w/Device KIT     buPROPion (WELLBUTRIN XL) 150 MG 24 hr tablet     calcium carbonate 500 mg, elemental, (OSCAL 500) 1250 (500 Ca) MG TABS tablet     darbepoetin veronika (ARANESP) 40 MCG/ML injection     ergocalciferol (ERGOCALCIFEROL) 1.25 MG (01590 UT) capsule     furosemide (LASIX) 80 MG tablet     furosemide (LASIX) 80 MG tablet     gabapentin (NEURONTIN) 100 MG capsule     gabapentin (NEURONTIN) 100 MG capsule     lactase (LACTAID) 3000 UNIT tablet     multivitamin RENAL (NEPHROCAPS/TRIPHROCAPS) 1 MG capsule     No current facility-administered medications for this encounter.     Medication Prescriber:  [facility] [place]  Taking meds as prescribed? Yes  Medication side " "effects or concerns:  NA  Outside medical appointments this week (list provider and reason for visit):  Dialysis and vivitrol    Client reports no new or worsening health concerns.    Dimension 3: Emotional/Behavioral Conditions & Complications -   Previous Dimension Ratin  Current Dimension Ratin  PHQ9     PHQ-9 SCORE 3/22/2021   PHQ-9 Total Score 9     GAD7     PILI-7 SCORE 3/22/2021   Total Score 10     Mental health diagnosis Patient reported the following previous mental health diagnoses: \"depression and anxiety\".    Date of last SIB:  NA  Date of  last SI:  NA  Date of last HI: NA  Behavioral Targets:  increase impulse control and  coping skills.   Current MH Assignments:  Mindfulness and 478 Breathing    Narrative:   Has impulse control and coping skills. Clt report inceased levels of stress. Clt reports no effective coping skills to manage stress. Client agreed he needs to schedule exercise and meditation to be sure it gets done.    Client rated his mental health concerns as 6-7 on a scale of 0 to 10, with 10 being the worst. He reports that the \"cause is stress with family members...\". He reports he uses a breathing exercise for stress relief.     Diension 4: Treatment Acceptance / Resistance -   Previous Dimension Ratin  Current Dimension Ratin  ALEXA Diagnosis:  Alcohol Use Disorder   303.90 (F10.20) Severe   Commitment to tx process/Stage of change- Preparation to action.   ALEXA assignments - Recovery Care Planninng    Narrative -    Displays verbal compliance, but lacks consistent behaviors. Reprts he was reconnecting with sponsor and sober support. Reports looking for another  mtg due to conflict with Tuesday night group. 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 Clt requested extension of programming for 5 sessions beyond .     2021 Client reports that his motivation for his recovery is a 10 on a scale of 0 to 10 with 10 being the most motivated.    Dimension 5: Relapse / " "Continued Problem Potential -   Previous Dimension Rating: 3  Current Dimension Ratin  Relapses this week - None this week. But on 2021 he had relapsed.   Urges to use - YES, List Yes, reported this is related to family stressors.   UA results - None taken this week  Narrative- Recognizes relapse issues and prevention strategies, but displays some vulnerability for further substance use or mental health problems. .Clt reports desire to exercise for self care.  Clt receiving MAT/Vivitrol inj for cravings. Clt reports exercising more and having fun socially with his wife. Camping this weekend on [employer]. Clt reported brief return to use episode on . He called group peers and counselor to self report. Client completed his relapse autopsy assignment in group.  21Client reported his last date of use was two weeks ago. He reports the recovery skill he is using to stay sober is playing the tape through. He attended sober support this week    Dmension 6: Recovery Environment -   Previous Dimension Rating:  3  Current Dimension Ratin  Family Involvement -   Summarize attendance at family groups and family sessions - None  Family supportive of treatment?  Client reports family is supportive meeting on 2021.     Community support group attendance - He reports having attended a noon  sober support meeting on 2021.  Recreational activities - \"My Birthday Party\".   Narrative -  Engaged in structured, meaningful activity. Client reports reconnecting with sober support and sponsor. Client reports full time employment.     Client reports having attended a sober support meeting this       Justification for Continued Treatment at this Level of Care:   displays some vulnerability for further substance use or mental health problems  Discharge Planning: TBD due to 2021 DOLU  Target Discharge Date/Timeframe:    Med Mgmt Provider/Appt:     Ind therapy Provider/Appt:     Family therapy " Provider/Appt:      Has vulnerable adult status change? No    Service Coordination:  NA    Supervision:  NA    Are Treatment Plan goals/objectives effective? Yes  *If no, list changes to treatment plan:    Are the current goals meeting client's needs? Yes  *If no, list the changes to treatment plan.    Client Input / Response: NA      *Client agrees with any changes to the treatment plan: N/A  *Client received copy of changes: Yes  *Client is aware of right to access a treatment plan review: Yes

## 2021-09-09 NOTE — ADDENDUM NOTE
Encounter addended by: Hermelindo Mann LADC on: 9/9/2021 12:57 PM   Actions taken: Clinical Note Signed

## 2021-09-14 ENCOUNTER — HOSPITAL ENCOUNTER (OUTPATIENT)
Dept: BEHAVIORAL HEALTH | Facility: CLINIC | Age: 56
End: 2021-09-14
Attending: FAMILY MEDICINE
Payer: COMMERCIAL

## 2021-09-14 PROCEDURE — H2035 A/D TX PROGRAM, PER HOUR: HCPCS | Mod: HQ,GT

## 2021-09-14 NOTE — GROUP NOTE
Group Therapy Documentation    PATIENT'S NAME: Monty Cox  MRN:   [id number]  :   1965  ACCT. NUMBER: [id number]  DATE OF SERVICE: 21  START TIME:  5:30 PM  END TIME:  7:30 PM  FACILITATOR(S): Hermelindo Mann LADC  TOPIC: BEH Group Therapy  Number of patients attending the group:  8  Group Length:  2 Hours    Group Therapy Type: Addiction    Summary of Group / Topics Discussed:    Psychoeducation/Skills Readiness to change (stages of change) [ALEXA]  This topic will give a general overview of stage of change models and how they apply to the personal experience of each patient.    Objective(s):    Patient will identify at least 2 stage of change models.    Patient will identify at least 5 stages within the Prochaska-DiClemente model.    Patient will identify their own position within the model(s).    Structure:    Provide psychoeducation on readiness to change and stages of change.    Facilitate group discussion around each patient s reasons to change and current place in the model(s).    Use teach-back techniques to ensure patients  understanding.    Provide patients with handouts to enhance learning.    Expected therapeutic outcomes:      Understand change as an essential and non-linear process.    Reduce confusion about ambivalence.    Enhanced motivation to change.    Ability to maintain/return to sobriety    Therapeutic outcome(s) measured by:    Patient s ability to teach-back information learned in group.    Patient s demonstration of learning by identification of their own stage of change      Telemedicine Visit: The patient's condition can be safely assessed and treated via synchronous audio and visual telemedicine encounter.      Reason for Telemedicine Visit: Services only offered telehealth    Originating Site (Patient Location): Patient's home    Distant Site (Provider Location): Provider Remote Setting- Home Office    Consent:  The patient/guardian has verbally consented to: the  potential risks and benefits of telemedicine (video visit) versus in person care; bill my insurance or make self-payment for services provided; and responsibility for payment of non-covered services.     Mode of Communication:  Video Conference via [employer]    As the provider I attest to compliance with applicable laws and regulations related to telemedicine.        Group Attendance:  Attended group session    Patient's response to the group topic/interactions:  cooperative with task    Patient appeared to be Actively participating.        Client specific details:  Client participated presentations and in group discussions on Understanding Ambivalence and Stages of Change, changing habits and change statements. Clt shared he was feeling good tonight and his job is rewarding because his employer is including hi ideas on major projects. Clt shared he attended his weekly AA meeting and is feeling like he belongs. Making solid progress on self care/ exercise goals..

## 2021-09-16 NOTE — ADDENDUM NOTE
Encounter addended by: Hermelindo Mann LADC on: 9/15/2021 7:48 PM   Actions taken: Clinical Note Signed

## 2021-09-21 ENCOUNTER — HOSPITAL ENCOUNTER (OUTPATIENT)
Dept: BEHAVIORAL HEALTH | Facility: CLINIC | Age: 56
End: 2021-09-21
Attending: FAMILY MEDICINE
Payer: COMMERCIAL

## 2021-09-21 PROCEDURE — H2035 A/D TX PROGRAM, PER HOUR: HCPCS | Mod: HQ,GT

## 2021-09-21 NOTE — GROUP NOTE
"Group Therapy Documentation    PATIENT'S NAME: Monty Cox  MRN:   [id number]  :   1965  ACCT. NUMBER: [id number]  DATE OF SERVICE: 21  START TIME:  5:30 PM  END TIME:  7:30 PM  FACILITATOR(S): Hermelindo Mann LADC  TOPIC: BEH Group Therapy  Number of patients attending the group:  6  Group Length:  2 Hours    Group Therapy Type: Life skill(s)    Summary of Group / Topics Discussed:    Coping Skills/Lifestyle Managemet: Dealing with Triggers and the Process of Relapse Relates to clients Dim 5 treatment plan goal.    Telemedicine Visit: The patient's condition can be safely assessed and treated via synchronous audio and visual telemedicine encounter.      Reason for Telemedicine Visit: Services only offered telehealth    Originating Site (Patient Location): Patient's home    Distant Site (Provider Location): Provider Remote Setting- Home Office    Consent:  The patient/guardian has verbally consented to: the potential risks and benefits of telemedicine (video visit) versus in person care; bill my insurance or make self-payment for services provided; and responsibility for payment of non-covered services.     Mode of Communication:  Video Conference via [employer]    As the provider I attest to compliance with applicable laws and regulations related to telemedicine.        Group Attendance:  Attended group session    Patient's response to the group topic/interactions:  cooperative with task    Patient appeared to be Attentive.        Client specific details:  Client checked in and reported no return to use. Clt identifies 3 reasons to remain sober. Client identified in group recovery actions taken to support recovery since last group. Clients acknowledged that many times addicts unable to choose to use coping skills once additive thinking takes hold. Group processed this situation as a \"state of being  where the addict his powerless to abort a return to use episode. Group processed  how to best " defend their recovery.

## 2021-09-22 NOTE — PROGRESS NOTES
[facility] Weekly Treatment Plan Review      ATTENDANCE for the following date span:21 through 21      Day Tuesday   Group Therapy 2 hours   Individual Therapy    Family Therapy    Other (Specify)        Patient did not have any absences during this period..    Weekly Treatment Plan Review      Treatment Plan initiated on: 21.     Dimension1: Acute Intoxication/Withdrawal Potential -   Previous Dimension Ratin  Current Dimension Ratin  Date of Last Use: 21   Any reports of withdrawal symptoms - No      Dimension 2: Biomedical Conditions & Complications -   Previous Dimension Ratin  Current Dimension Ratin  Medical Concerns:   Dialysis and vivitrol  Current Medications & Medication Changes:  Current Outpatient Medications   Medication     ACCU-CHEK GUIDE test strip     acetaminophen (TYLENOL) 325 MG tablet     allopurinol (ZYLOPRIM) 100 MG tablet     allopurinol (ZYLOPRIM) 100 MG tablet     blood glucose (ACCU-CHEK GUIDE) test strip     blood glucose monitoring (ACCU-CHEK ERIKA PLUS) meter device kit     blood glucose monitoring (SOFTCLIX) lancets     blood glucose monitoring (SOFTCLIX) lancets     Blood Glucose Monitoring Suppl (ACCU-CHEK GUIDE) w/Device KIT     buPROPion (WELLBUTRIN XL) 150 MG 24 hr tablet     calcium carbonate 500 mg, elemental, (OSCAL 500) 1250 (500 Ca) MG TABS tablet     darbepoetin veronika (ARANESP) 40 MCG/ML injection     ergocalciferol (ERGOCALCIFEROL) 1.25 MG (40791 UT) capsule     furosemide (LASIX) 80 MG tablet     furosemide (LASIX) 80 MG tablet     gabapentin (NEURONTIN) 100 MG capsule     gabapentin (NEURONTIN) 100 MG capsule     lactase (LACTAID) 3000 UNIT tablet     multivitamin RENAL (NEPHROCAPS/TRIPHROCAPS) 1 MG capsule     No current facility-administered medications for this encounter.     [place]  [facility] [place] meds as prescribed? Yes  Medication side effects or concerns:  NA  Outside medical appointments this week (list  "provider and reason for visit):  Dialysis and vivitrol    Client reports no new or worsening health concerns.    Dimension 3: Emotional/Behavioral Conditions & Complications -   Previous Dimension Ratin  Current Dimension Ratin  PHQ9     PHQ-9 SCORE 3/22/2021   PHQ-9 Total Score 9     GAD7     PILI-7 SCORE 3/22/2021   Total Score 10     Mental health diagnosis Patient reported the following previous mental health diagnoses: \"depression and anxiety\".    Date of last SIB:  NA  Date of  last SI:  NA  Date of last HI: NA  Behavioral Targets:  increase impulse control and  coping skills.   Current MH Assignments:  Mindfulness and 478 Breathing    Narrative:   Has impulse control and coping skills. Clt report inceased levels of stress. Clt reports no effective coping skills to manage stress. Client agreed he needs to schedule exercise and meditation to be sure it gets done.    Client rated his mental health concerns as 6-7 on a scale of 0 to 10, with 10 being the worst. He reports that the \"cause is stress with family members...\". He reports he uses a breathing exercise for stress relief.     Diension 4: Treatment Acceptance / Resistance -   Previous Dimension Ratin  Current Dimension Ratin  ALEXA Diagnosis:  Alcohol Use Disorder   303.90 (F10.20) Severe   Commitment to tx process/Stage of change- Preparation to action.   ALEXA assignments - Recovery Care Planninng    Narrative -    Displays verbal compliance, but lacks consistent behaviors. Reprts he was reconnecting with sponsor and sober support. Reports looking for another  mt due to conflict with Tuesday night group. 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 Clt requested extension of programming to 10/5/21         Dimension 5: Relapse / Continued Problem Potential -   Previous Dimension Rating: 3  Current Dimension Ratin  Relapses this week - None this week. But on 2021 he had relapsed.   Urges to use - YES, List Yes, reported this is " related to family stressors.   UA results - None taken this week  Narrative- Recognizes relapse issues and prevention strategies, but displays some vulnerability for further substance use or mental health problems. .Clt reports begining to exercise for self care.  Clt receiving MAT/Vivitrol inj for cravings. Clt reported brief return to use episode on .   Client needs to establish sober connection to support recovery.    Dmension 6: Recovery Environment -   Previous Dimension Rating:  3  Current Dimension Ratin  Family Involvement -   Summarize attendance at family groups and family sessions - None  Family supportive of treatment?  Client reports family is supportive.     Community support group attendance - He reports having attended a noon AA sober support meeting on Monday  Recreational activities -   Narrative -  Engaged in structured, meaningful activity. Client reports reconnecting with sober support and sponsor. Client reports full time employment.     Client reports having attended a sober support meeting this       Justification for Continued Treatment at this Level of Care:   displays some vulnerability for further substance use or mental health problems  Discharge Planning: TBD due to 2021 DOLU  Target Discharge Date/Timeframe:    Med Mgmt Provider/Appt:     Ind therapy Provider/Appt:     Family therapy Provider/Appt:      Has vulnerable adult status change? No    Service Coordination:  NA    Supervision:  NA    Are Treatment Plan goals/objectives effective? Yes  *If no, list changes to treatment plan:    Are the current goals meeting client's needs? Yes  *If no, list the changes to treatment plan.    Client Input / Response: NA      *Client agrees with any changes to the treatment plan: N/A  *Client received copy of changes: Yes  *Client is aware of right to access a treatment plan review: Yes

## 2021-09-22 NOTE — ADDENDUM NOTE
Encounter addended by: Hermelindo Mann LADC on: 9/22/2021 3:14 PM   Actions taken: Clinical Note Signed

## 2021-09-28 ENCOUNTER — HOSPITAL ENCOUNTER (OUTPATIENT)
Dept: BEHAVIORAL HEALTH | Facility: CLINIC | Age: 56
End: 2021-09-28
Attending: FAMILY MEDICINE
Payer: COMMERCIAL

## 2021-09-28 PROCEDURE — H2035 A/D TX PROGRAM, PER HOUR: HCPCS | Mod: HQ,95

## 2021-09-28 NOTE — GROUP NOTE
Group Therapy Documentation    PATIENT'S NAME: Monty Cox  MRN:   [id number]  :   1965  [id number]  DATE OF SERVICE: 21  START TIME:  5:30 PM  END TIME:  7:30 PM  FACILITATOR(S): Hermelindo Mann LADC  TOPIC: BEH Group Therapy  Number of patients attending the group:  7  Group Length:  2 Hours    Group Therapy Type: Life skill(s)    Summary of Group / Topics Discussed:    Choices in Recovery, relates to Clients Dim 6 treatment plan goals to build sober support.    Telemedicine Visit: The patient's condition can be safely assessed and treated via synchronous audio and visual telemedicine encounter.      Reason for Telemedicine Visit: Services only offered telehealth    Originating Site (Patient Location): Patient's home    Distant Site (Provider Location): Provider Remote Setting- Home Office    Consent:  The patient/guardian has verbally consented to: the potential risks and benefits of telemedicine (video visit) versus in person care; bill my insurance or make self-payment for services provided; and responsibility for payment of non-covered services.     Mode of Communication:  Video Conference via Watsi    As the provider I attest to compliance with applicable laws and regulations related to telemedicine.            Group Attendance:  Attended group session    Patient's response to the group topic/interactions:  cooperative with task    Patient appeared to be Actively participating.        Client specific details:  Client checked in and reported no change in sobriety date. Clt talked about their week since last group and recovery action taken. Clt reported attending sober support and signing up for the Master  Program. Client discussed in group the process of a relapse in use and identified 2 of their warning signs and triggers and 2 healthy coping mechanisms for dealing with them.  Client participated in group discussions on goal planning for phase 3 and the  8  Essentials for Recovery . Clt listened to peer present recovery care plans in group with questions and feedback as follow up. Strong emphasis placed on sober support attendance and connections being made in recovery community. This related to Clients Dim 6 tx plan goal..

## 2021-09-30 NOTE — PROGRESS NOTES
[facility] Weekly Treatment Plan Review      ATTENDANCE for the following date span:21 through 10/3/21      Day Tuesday   Group Therapy 2 hours   Individual Therapy    Family Therapy    Other (Specify)        Patient did not have any absences during this period..    Weekly Treatment Plan Review      Treatment Plan initiated on: 21.     Dimension1: Acute Intoxication/Withdrawal Potential -   Previous Dimension Ratin  Current Dimension Ratin  Date of Last Use: 21   Any reports of withdrawal symptoms - No      Dimension 2: Biomedical Conditions & Complications -   Previous Dimension Ratin  Current Dimension Ratin  Medical Concerns:   Dialysis and vivitrol  Current Medications & Medication Changes:  Current Outpatient Medications   Medication     ACCU-CHEK GUIDE test strip     acetaminophen (TYLENOL) 325 MG tablet     allopurinol (ZYLOPRIM) 100 MG tablet     allopurinol (ZYLOPRIM) 100 MG tablet     blood glucose (ACCU-CHEK GUIDE) test strip     blood glucose monitoring (ACCU-CHEK ERIKA PLUS) meter device kit     blood glucose monitoring (SOFTCLIX) lancets     blood glucose monitoring (SOFTCLIX) lancets     Blood Glucose Monitoring Suppl (ACCU-CHEK GUIDE) w/Device KIT     buPROPion (WELLBUTRIN XL) 150 MG 24 hr tablet     calcium carbonate 500 mg, elemental, (OSCAL 500) 1250 (500 Ca) MG TABS tablet     darbepoetin veronika (ARANESP) 40 MCG/ML injection     ergocalciferol (ERGOCALCIFEROL) 1.25 MG (45483 UT) capsule     furosemide (LASIX) 80 MG tablet     furosemide (LASIX) 80 MG tablet     gabapentin (NEURONTIN) 100 MG capsule     gabapentin (NEURONTIN) 100 MG capsule     lactase (LACTAID) 3000 UNIT tablet     multivitamin RENAL (NEPHROCAPS/TRIPHROCAPS) 1 MG capsule     No current facility-administered medications for this encounter.     Medication Prescriber:  [facility]  Taking meds as prescribed? Yes  Medication side effects or concerns:  NA  Outside medical appointments this week (list  "provider and reason for visit):  Dialysis and vivitrol    Client reports no new or worsening health concerns.    Dimension 3: Emotional/Behavioral Conditions & Complications -   Previous Dimension Ratin  Current Dimension Ratin  PHQ9     PHQ-9 SCORE 3/22/2021   PHQ-9 Total Score 9     GAD7     PILI-7 SCORE 3/22/2021   Total Score 10     Mental health diagnosis Patient reported the following previous mental health diagnoses: \"depression and anxiety\".    Date of last SIB:  NA  Date of  last SI:  NA  Date of last HI: NA  Behavioral Targets:  increase impulse control and  coping skills.   Current MH Assignments:  Mindfulness and 478 Breathing    Narrative:   Has impulse control and coping skills. Clt report inceased levels of stress. Clt reports no effective coping skills to manage stress. Client agreed he needs to schedule exercise and meditation to be sure it gets done.    Client rated his mental health concerns as 6-7 on a scale of 0 to 10, with 10 being the worst. He reports that the \"cause is stress with family members...\". He reports he uses a breathing exercise for stress relief.     Diension 4: Treatment Acceptance / Resistance -   Previous Dimension Ratin  Current Dimension Ratin  ALEXA Diagnosis:  Alcohol Use Disorder   303.90 (F10.20) Severe   Commitment to tx process/Stage of change- Preparation to action.   ALEXA assignments - Recovery Care Planninng    Narrative -    Displays verbal compliance, but lacks consistent behaviors. Reprts he was reconnecting with sponsor and sober support. Reports looking for another  mt due to conflict with Tuesday night group. 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 Clt requested extension of programming to 10/5/21         Dimension 5: Relapse / Continued Problem Potential -   Previous Dimension Rating: 3  Current Dimension Ratin   Relapses this week - None this week. But on 2021 he had relapsed.   Urges to use - None  UA results - None taken this " week  Narrative- Recognizes relapse issues and prevention strategies, but displays some vulnerability for further substance use or mental health problems. .Clt reports begining to exercise for self care.  Clt receiving MAT/Vivitrol inj for cravings. Clt reported brief return to use episode on .   Client needs to establish sober connection to support recovery.    Dmension 6: Recovery Environment -   Previous Dimension Rating:  3  Current Dimension Ratin  Family Involvement -   Summarize attendance at family groups and family sessions - None  Family supportive of treatment?  Client reports family is supportive.     Community support group attendance - He reports having attended a noon  sober support meeting on Monday  Recreational activities -   Narrative -  Engaged in structured, meaningful activity. Client reports reconnecting with sober support and sponsor. Client reports full time employment. Client reports having attended a sober support meeting this week.      Justification for Continued Treatment at this Level of Care:   displays some vulnerability for further substance use or mental health problems  Discharge Planning: TBD due to 2021 DOLU  Target Discharge Date/Timeframe:    Med Mgmt Provider/Appt:     Ind therapy Provider/Appt:     Family therapy Provider/Appt:      Has vulnerable adult status change? No    Service Coordination:  NA    Supervision:  NA    Are Treatment Plan goals/objectives effective? Yes  *If no, list changes to treatment plan:    Are the current goals meeting client's needs? Yes  *If no, list the changes to treatment plan.    Client Input / Response: NA      *Client agrees with any changes to the treatment plan: N/A  *Client received copy of changes: Yes  *Client is aware of right to access a treatment plan review: Yes

## 2021-09-30 NOTE — ADDENDUM NOTE
Encounter addended by: Hermelindo Mann LADC on: 9/30/2021 1:44 PM   Actions taken: Clinical Note Signed

## 2021-10-05 ENCOUNTER — HOSPITAL ENCOUNTER (OUTPATIENT)
Dept: BEHAVIORAL HEALTH | Facility: CLINIC | Age: 56
End: 2021-10-05
Attending: FAMILY MEDICINE
Payer: COMMERCIAL

## 2021-10-05 PROCEDURE — H2035 A/D TX PROGRAM, PER HOUR: HCPCS | Mod: HQ,GT

## 2021-10-05 NOTE — GROUP NOTE
Group Therapy Documentation    PATIENT'S NAME: Monty Cox  MRN:   [id number]  :   1965  [id number]  DATE OF SERVICE: 10/05/21  START TIME:  5:30 PM  END TIME:  7:30 PM  FACILITATOR(S): Hermelindo Mnan LADC  TOPIC: BEH Group Therapy  Number of patients attending the group:  8  Group Length:  2 Hours    Group Therapy Type: Life skill(s)    Summary of Group / Topics Discussed:    Coping Skills/Lifestyle Managemet and Choices in Recovery relates to client Dim 5 and Dim 6 treatment plan goals    Telemedicine Visit: The patient's condition can be safely assessed and treated via synchronous audio and visual telemedicine encounter.      Reason for Telemedicine Visit: Services only offered telehealth    Originating Site (Patient Location): Patient's home    Distant Site (Provider Location): Provider Remote Setting- Home Office    Consent:  The patient/guardian has verbally consented to: the potential risks and benefits of telemedicine (video visit) versus in person care; bill my insurance or make self-payment for services provided; and responsibility for payment of non-covered services.     Mode of Communication:  Video Conference via Klatcher    As the provider I attest to compliance with applicable laws and regulations related to telemedicine.        Group Attendance:  Attended group session    Patient's response to the group topic/interactions:  cooperative with task    Patient appeared to be Actively participating.        Client specific details:  Client participated in group discussion of Personal Recovery Plan presentations and Exploration/ Promises of Recovery. .

## 2021-10-06 NOTE — DISCHARGE SUMMARY
CHEMICAL DEPENDENCY DISCHARGE SUMMARY       EVALUATION OUNSELOR: ELIGIO Simpson      TREATMENT COUNSELOR:   [facility]ermelindo Mann [facility]     REFERRAL SOURCE:  Self     PROGRAM:  [facility] Adult Substance Use Disorder Intensive Outpatient Program in [facility].     ADMISSION DATE:  3/31/21     LAST SESSION DATE:  10/5/2021     DISCHARGE DATE:  10/6/2021    ADMISSION IMPRESSION:   Alcohol Use Disorder   303.90 (F10.20) Severe      DISCHARGE IMPRESSION:   Alcohol Use Disorder   303.90 (F10.20) Severe    REASON FOR DISCHARGE:  Client successfully completed the program.     LAST DATE OF USE:  Client reports last use date as 21      HOURS OF TREATMENT COMPLETED:  This client completed 24 sessions of Phase I, 14 sessions of Phase 2, and 14 sessions of Phase 3, plus service initiation, treatment planning, and individual sessions for a total of 112 hours of treatment.      REASON FOR EVALUATION:   This patient had a Rule 25 Assessment on 3/22/21 at [facility] completed by ELIGIO Simpson.  INSIDE: The reason for the substance use disorder assessment was due to the patient's own awareness he needed help.       SERVICES PROVIDED:  The services provided included: treatment and discharge planning, psychoeducation, recovery skill building, relapse prevention skills, problem solving, managing conflicts and communication skill building, clarifying values, expressing feelings, social skill building, 12-step facilitation, individual psychotherapy and group therapy.     ISSUES ADDRESSED IN TREATMENT:      DIMENSION 1:  Acute Withdrawal Issues and Detox:    Admit to IOP risk rating 1, discharge risk ratin  Client's goal in this dimension was for client to develop effective strategies to maintain sobriety.  Client successfully met this goal and remained abstinent throughout treatment.  No concerns in this dimension at time of discharge.      DIMENSION 2:  Biomedical  Conditions and Complaints.    Admit to [place] risk rating 2, discharge risk ratin  Client s goals were to disclose CD status to medical providers and follow up with medical interventions while in [place], and to maintain good physical health.  Client followed up with medical interventions and disclosed ALEXA diagnosis to providers while in program.  Client displays full functioning with good ability to cope with physical discomfort and is able to get the services he needs.  No concerns in this dimension at time of discharge.     DIMENSION 3:  Emotional, Behavioral, Cognitive Conditions and Complications:    Admit to [place] risk rating 2, discharge risk ratin  Client's goals in this area were to learn how to apply self-care and psychotherapy to build a repertoire of daily habits that counteract PAWS, fatigue, depression and anxiety leading to increased resiliency and well-being. Further goals were to understand the relationship between mental health concerns and addiction. Client completed 6 of 6 mental health skills groups at [facility].  No SI or other concerns while in [place] CD.      DIMENSION 4:  Readiness to Change:    Admit to [place] risk rating 2, discharge risk ratin  Client's goal in this dimension was for client to understand the impact his substance use has had on him, his family, and significant relationships, and to Increase internal motivation to change. Client was effective in meeting these goals.  The client completed all treatment strategies in this dimension, including completion of his  Symptoms  assignment.  Client was able to gain insight into his past behaviors. He identified how he had violated his values and how strain in many relationships were related to his use.  Client expressed 3 reasons he wished to remain sober each week and had regular and punctual attendance in groups except for excused absences.  He was an active and responsible participant throughout treatment.  Client  demonstrates that he is cooperative, motivated, and committed to change.  At treatment end client expressed clear advantages of being sober.  No concerns in this dimension at time of discharge.     DIMENSION 5:  Relapse, Continued Use, Continued Problem Potential:    Admit to IOP risk rating 3, discharge risk ratin  Client's goals in this area were to develop sober coping and living skills in order to address the development of a strategy for long term recovery.  Client was effective in completing all treatment strategies.  Client completed his  Relapse Prevention Plan  and  Recovery Care Plan  assignments.  Client appears to have accepted that he has a chronic disease requiring daily management and to have gained insight into himself and his relapse triggers.  He is using strategies to prevent them from causing relapse.  Client completed a Recovery Care Plan and discussed how he will implement it for successful recovery. At discharge, client reports irregular attendance at AA meetings. No concerns in this dimension at time of discharge.       DIMENSION 6:  Recovery Environment:    Admit to IOP risk rating 3, discharge risk ratin  Client's goal in this dimension was to increase his sober support network.  Clt reports actively working a Recovery program and irregular attendance of AA meetings. Client is employed and he currently lives with his mother in her home.  He reports his environment to be safe and supportive of recovery.      STRENGTHS:  Client was cooperative and actively participated throughout treatment.  Client was able to give good insight and feedback to other members of the group.  Client was able to recognize the harmful consequences of continued use.  Client displays a hopeful attitude toward the future.  Client identifies self-awareness and commitment as personal strengths.      PROGNOSIS:  This client has a favorable prognosis assuming he follows all continuation of care recommendations.       LIVING ARRANGEMENTS AT DISCHARGE:  Client currently lives with his mother in her home.        CONTINUATION OF CARE RECOMMENDATIONS:    Client is recommended to abstain from all non-prescribed mood-altering substances.    Client is recommended to manage his mental and physical health with his health care providers and follow recommendations made by them.    Client is recommended to continue to take medications as prescribed and continually follow up with his prescribing healthcare providers as directed to do so.    Client is recommended to attend sober support meetings on a weekly and regular basis.    Client is recommended to obtain and have regular contact with a sponsor or sober mentor.   Client is recommended to maintain open communication with family and friends about sober life.  Client is recommended to maintain contact with supportive people in recovery.  Client is recommended to engage in self-care activities daily.   Client is recommended to remain law abiding.        Hermelindo Mann MA, [facility]     Monty JOSE GUADALUPE Kenny   : 1965  MRN: [id number]       This information has been disclosed to you from records protected by   Federal confidentiality rules (42 CFR part 2). The Federal rules prohibit   you from making any further disclosure of this information unless further   disclosure is expressly permitted by the written consent of the person to   whom it pertains or as otherwise permitted by 42 CFR part 2. A general   authorization for the release of medical or other information is NOT   sufficient for this purpose. The Federal rules restrict any use of the   information to criminally investigate or prosecute any alcohol or drug   abuse patient.

## 2021-10-06 NOTE — ADDENDUM NOTE
Encounter addended by: Hermelindo Mann LADC on: 10/6/2021 2:17 PM   Actions taken: Clinical Note Signed

## 2022-06-12 ENCOUNTER — HEALTH MAINTENANCE LETTER (OUTPATIENT)
Age: 57
End: 2022-06-12

## 2022-07-16 ENCOUNTER — HOSPITAL ENCOUNTER (EMERGENCY)
Facility: CLINIC | Age: 57
Discharge: HOME | End: 2022-07-16
Attending: PHYSICIAN ASSISTANT | Admitting: PHYSICIAN ASSISTANT
Payer: COMMERCIAL

## 2022-07-16 VITALS
HEART RATE: 82 BPM | SYSTOLIC BLOOD PRESSURE: 119 MMHG | DIASTOLIC BLOOD PRESSURE: 76 MMHG | RESPIRATION RATE: 18 BRPM | TEMPERATURE: 98.1 F | BODY MASS INDEX: 24.82 KG/M2 | OXYGEN SATURATION: 100 % | WEIGHT: 183 LBS

## 2022-07-16 DIAGNOSIS — L02.511 ABSCESS OF FINGER OF RIGHT HAND: ICD-10-CM

## 2022-07-16 PROCEDURE — 10060 I&D ABSCESS SIMPLE/SINGLE: CPT | Performed by: PHYSICIAN ASSISTANT

## 2022-07-16 PROCEDURE — 87205 SMEAR GRAM STAIN: CPT | Performed by: PHYSICIAN ASSISTANT

## 2022-07-16 PROCEDURE — 99203 OFFICE O/P NEW LOW 30 MIN: CPT | Mod: 25 | Performed by: PHYSICIAN ASSISTANT

## 2022-07-16 PROCEDURE — G0463 HOSPITAL OUTPT CLINIC VISIT: HCPCS | Mod: 25 | Performed by: PHYSICIAN ASSISTANT

## 2022-07-16 RX ORDER — CEPHALEXIN 500 MG/1
500 CAPSULE ORAL 4 TIMES DAILY
Qty: 28 CAPSULE | Refills: 0 | Status: SHIPPED | OUTPATIENT
Start: 2022-07-16 | End: 2022-07-23

## 2022-07-16 NOTE — DISCHARGE INSTRUCTIONS
Use antibiotic as directed.  Wound culture sent    Warm soaks 3-4 times daily    Referral for orthopedic hand specialist.  Follow-up with them next week for recheck    Turn to the emergency department if decreased range of motion, red streaking up the finger, numbness or tingling, fevers or chills, change or worsening of symptoms occur

## 2022-07-16 NOTE — ED NOTES
Facility nurse Jennifer was called with report from nurse, and said she would call for the ride at 3pm.

## 2022-07-16 NOTE — ED PROVIDER NOTES
History     Chief Complaint   Patient presents with     Thumb Discomfort     HPI  Monty Cox is a 56 year old male who presents today for right thumb abscess. korina is currently at [facility] for alcohol abuse and has history of gout with joint swelling in the past. Patient states about 1 week ago pain to right thumb that he thought was a gout flare, but over the past several days increased swelling to the dorsal thumb just distal to DIP joint. Patient denies fevers, chills, red streaking, numbness. He states some redness, warmth, and pain with significant swelling that is decreasing his flexion.  Patient's last tetanus was in 2016.    Allergies:  No Known Allergies    Problem List:    There are no problems to display for this patient.       Past Medical History:    History reviewed. No pertinent past medical history.    Past Surgical History:    History reviewed. No pertinent surgical history.    Family History:    Family History   Problem Relation Age of Onset     Substance Abuse Father      Substance Abuse Nephew        Social History:  Marital Status:   [2]  Social History     Tobacco Use     Smoking status: Never Smoker     Smokeless tobacco: Never Used        Medications:    cephALEXin (KEFLEX) 500 MG capsule  ACCU-CHEK GUIDE test strip  acetaminophen (TYLENOL) 325 MG tablet  allopurinol (ZYLOPRIM) 100 MG tablet  allopurinol (ZYLOPRIM) 100 MG tablet  blood glucose (ACCU-CHEK GUIDE) test strip  blood glucose monitoring (ACCU-CHEK ERIKA PLUS) meter device kit  blood glucose monitoring (SOFTCLIX) lancets  blood glucose monitoring (SOFTCLIX) lancets  Blood Glucose Monitoring Suppl (ACCU-CHEK GUIDE) w/Device KIT  buPROPion (WELLBUTRIN XL) 150 MG 24 hr tablet  calcium carbonate 500 mg, elemental, (OSCAL 500) 1250 (500 Ca) MG TABS tablet  darbepoetin veronika (ARANESP) 40 MCG/ML injection  ergocalciferol (ERGOCALCIFEROL) 1.25 MG (50241 UT) capsule  furosemide (LASIX) 80 MG  tablet  furosemide (LASIX) 80 MG tablet  gabapentin (NEURONTIN) 100 MG capsule  lactase (LACTAID) 3000 UNIT tablet  multivitamin RENAL (NEPHROCAPS/TRIPHROCAPS) 1 MG capsule          Review of Systems   Constitutional: Negative for fever.   HENT: Negative for congestion.    Eyes: Negative for redness.   Respiratory: Negative for shortness of breath.    Cardiovascular: Negative for chest pain.   Gastrointestinal: Negative for abdominal pain.   Genitourinary: Negative for difficulty urinating.   Musculoskeletal: Negative for arthralgias and neck stiffness.   Skin: Negative for color change.        Right thumb abscess    Neurological: Negative for headaches.   Psychiatric/Behavioral: Negative for confusion.   All other systems reviewed and are negative.      Physical Exam   BP: 119/76  Pulse: 82  Temp: 98.1  F (36.7  C)  Resp: 18  Weight: 83 kg (183 lb)  SpO2: 100 %      Physical Exam  Vitals and nursing note reviewed.   Constitutional:       Appearance: Normal appearance. He is normal weight.   Cardiovascular:      Pulses: Normal pulses.   Musculoskeletal:         General: No tenderness.      Comments: Right decreased range of motion with full flexion due to significant swelling however once I&D was done patient full range of motion with and without resistance in all directions.  Patient neurovascularly intact   Skin:     General: Skin is warm.      Comments: See image below.  Large abscess with fluctuance noted to the right thumb just distal to the DIP joint but not involving the paronychia.  No obvious erythema noted at this time however tenderness and slight warmth appreciated.   Neurological:      Mental Status: He is alert.               ED Course                 Procedures             Critical Care time:  none               No results found for this or any previous visit (from the past 24 hour(s)).    Medications - No data to display    Assessments & Plan (with Medical Decision Making)     I have reviewed the  nursing notes.    I have reviewed the findings, diagnosis, plan and need for follow up with the patient.    Monty Cox is a 56 year old male who presents today for right thumb abscess. korina is currently at [facility] for alcohol abuse and has history of gout with joint swelling in the past. Patient states about 1 week ago pain to right thumb that he thought was a gout flare, but over the past several days increased swelling to the dorsal thumb just distal to DIP joint. Patient denies fevers, chills, red streaking, numbness. He states some redness, warmth, and pain with significant swelling that is decreasing his flexion.  Patient's last tetanus was in 2016.    See exam findings and images above.  Vitals within normal limits.  Exam findings consistent with abscess.  No obvious paronychia noted and this appears to be just distal to the DIP joint.  Area was cleaned and single straight incision made with purulent and bloody drainage.  Wound culture sent and currently pending.  It was irrigated extensively after incision and drainage and bacitracin and bandage applied.  Patient sent home with Keflex and informed to return if symptoms worsen or change.  No concerning findings at this time for tendon involvement.  Patient has full range of motion once the abscess was drained and was neurovascularly intact with normal capillary refills.  Patient tolerated procedure well.  Patient given hand referral to follow-up since he is currently at a treatment center and was discharged in stable condition.  Patient in agreement with plan.    Discharge Medication List as of 7/16/2022  2:51 PM      START taking these medications    Details   cephALEXin (KEFLEX) 500 MG capsule Take 1 capsule (500 mg) by mouth 4 times daily for 7 days, Disp-28 capsule, R-0, E-Prescribe             Final diagnoses:   Abscess of finger of right hand - thumb       7/16/2022   [facility] EMERGENCY DEPT     Aman,  Salma MARTINEZ PA-C  07/17/22 2025

## 2022-07-16 NOTE — ED TRIAGE NOTES
Pt has abcess on right thumb, no fever or chills reported     Triage Assessment     Row Name 07/16/22 2174       Triage Assessment (Adult)    Airway WDL WDL       Respiratory WDL    Respiratory WDL WDL       Skin Circulation/Temperature WDL    Skin Circulation/Temperature WDL WDL       Cardiac WDL    Cardiac WDL WDL       Peripheral/Neurovascular WDL    Peripheral Neurovascular WDL WDL       Cognitive/Neuro/Behavioral WDL    Cognitive/Neuro/Behavioral WDL WDL

## 2022-07-17 ASSESSMENT — ENCOUNTER SYMPTOMS
HEADACHES: 0
EYE REDNESS: 0
DIFFICULTY URINATING: 0
ABDOMINAL PAIN: 0
FEVER: 0
ARTHRALGIAS: 0
NECK STIFFNESS: 0
SHORTNESS OF BREATH: 0
CONFUSION: 0
COLOR CHANGE: 0

## 2022-07-17 NOTE — RESULT ENCOUNTER NOTE
[facility] Emergency Dept discharge antibiotic prescribed: Cephalexin (Keflex) 500 mg capsule, 1 capsule (500 mg) by mouth 4 times daily for 7 days.  Incision and Drainage performed in [facility] Emergency Dept [Yes or No]: Yes  Recommendations in treatment per [facility] ED Lab Result culture protocol

## 2022-07-21 LAB
BACTERIA ABSC ANAEROBE+AEROBE CULT: NO GROWTH
GRAM STAIN RESULT: NORMAL
GRAM STAIN RESULT: NORMAL

## 2022-07-21 NOTE — RESULT ENCOUNTER NOTE
Final Abscess Aerobic Bacterial Culture report is NEGATIVE.    No treatment or change in treatment per [facility] Lab Result culture protocol.

## 2022-10-23 ENCOUNTER — HEALTH MAINTENANCE LETTER (OUTPATIENT)
Age: 57
End: 2022-10-23

## 2023-03-16 ENCOUNTER — TELEPHONE (OUTPATIENT)
Dept: BEHAVIORAL HEALTH | Facility: CLINIC | Age: 58
End: 2023-03-16

## 2023-03-16 ENCOUNTER — HOSPITAL ENCOUNTER (INPATIENT)
Facility: CLINIC | Age: 58
LOS: 2 days | Discharge: HOME | DRG: 897 | End: 2023-03-18
Attending: EMERGENCY MEDICINE | Admitting: PSYCHIATRY & NEUROLOGY
Payer: COMMERCIAL

## 2023-03-16 DIAGNOSIS — F10.220 ALCOHOL DEPENDENCE WITH UNCOMPLICATED INTOXICATION (H): ICD-10-CM

## 2023-03-16 DIAGNOSIS — Z20.822 CONTACT WITH AND (SUSPECTED) EXPOSURE TO COVID-19: ICD-10-CM

## 2023-03-16 DIAGNOSIS — F33.1 MODERATE EPISODE OF RECURRENT MAJOR DEPRESSIVE DISORDER (H): Primary | ICD-10-CM

## 2023-03-16 DIAGNOSIS — E83.42 HYPOMAGNESEMIA: ICD-10-CM

## 2023-03-16 LAB
ALBUMIN SERPL BCG-MCNC: 3.7 G/DL (ref 3.5–5.2)
ALCOHOL BREATH TEST: 0.31 (ref 0–0.01)
ALP SERPL-CCNC: 130 U/L (ref 40–129)
ALT SERPL W P-5'-P-CCNC: 18 U/L (ref 10–50)
AMPHETAMINES UR QL SCN: NORMAL
ANION GAP SERPL CALCULATED.3IONS-SCNC: 21 MMOL/L (ref 7–15)
AST SERPL W P-5'-P-CCNC: 184 U/L (ref 10–50)
BARBITURATES UR QL SCN: NORMAL
BASOPHILS # BLD AUTO: 0 10E3/UL (ref 0–0.2)
BASOPHILS NFR BLD AUTO: 1 %
BENZODIAZ UR QL SCN: NORMAL
BILIRUB SERPL-MCNC: 0.3 MG/DL
BUN SERPL-MCNC: 21.8 MG/DL (ref 6–20)
BZE UR QL SCN: NORMAL
CALCIUM SERPL-MCNC: 7.8 MG/DL (ref 8.6–10)
CANNABINOIDS UR QL SCN: NORMAL
CHLORIDE SERPL-SCNC: 100 MMOL/L (ref 98–107)
CREAT SERPL-MCNC: 1.3 MG/DL (ref 0.67–1.17)
DEPRECATED HCO3 PLAS-SCNC: 20 MMOL/L (ref 22–29)
EOSINOPHIL # BLD AUTO: 0 10E3/UL (ref 0–0.7)
EOSINOPHIL NFR BLD AUTO: 1 %
ERYTHROCYTE [DISTWIDTH] IN BLOOD BY AUTOMATED COUNT: 15.1 % (ref 10–15)
GFR SERPL CREATININE-BSD FRML MDRD: 64 ML/MIN/1.73M2
GLUCOSE SERPL-MCNC: 112 MG/DL (ref 70–99)
HCT VFR BLD AUTO: 26.9 % (ref 40–53)
HGB BLD-MCNC: 8.9 G/DL (ref 13.3–17.7)
IMM GRANULOCYTES # BLD: 0 10E3/UL
IMM GRANULOCYTES NFR BLD: 1 %
LYMPHOCYTES # BLD AUTO: 1.2 10E3/UL (ref 0.8–5.3)
LYMPHOCYTES NFR BLD AUTO: 29 %
MAGNESIUM SERPL-MCNC: 1.3 MG/DL (ref 1.7–2.3)
MCH RBC QN AUTO: 33.7 PG (ref 26.5–33)
MCHC RBC AUTO-ENTMCNC: 33.1 G/DL (ref 31.5–36.5)
MCV RBC AUTO: 102 FL (ref 78–100)
MONOCYTES # BLD AUTO: 0.4 10E3/UL (ref 0–1.3)
MONOCYTES NFR BLD AUTO: 10 %
NEUTROPHILS # BLD AUTO: 2.4 10E3/UL (ref 1.6–8.3)
NEUTROPHILS NFR BLD AUTO: 58 %
NRBC # BLD AUTO: 0 10E3/UL
NRBC BLD AUTO-RTO: 0 /100
OPIATES UR QL SCN: NORMAL
PLATELET # BLD AUTO: 209 10E3/UL (ref 150–450)
POTASSIUM SERPL-SCNC: 3.6 MMOL/L (ref 3.4–5.3)
PROT SERPL-MCNC: 7.7 G/DL (ref 6.4–8.3)
RBC # BLD AUTO: 2.64 10E6/UL (ref 4.4–5.9)
SARS-COV-2 RNA RESP QL NAA+PROBE: NEGATIVE
SODIUM SERPL-SCNC: 141 MMOL/L (ref 136–145)
WBC # BLD AUTO: 4 10E3/UL (ref 4–11)

## 2023-03-16 PROCEDURE — 96365 THER/PROPH/DIAG IV INF INIT: CPT | Performed by: EMERGENCY MEDICINE

## 2023-03-16 PROCEDURE — 80307 DRUG TEST PRSMV CHEM ANLYZR: CPT | Performed by: EMERGENCY MEDICINE

## 2023-03-16 PROCEDURE — 96366 THER/PROPH/DIAG IV INF ADDON: CPT | Performed by: EMERGENCY MEDICINE

## 2023-03-16 PROCEDURE — 85004 AUTOMATED DIFF WBC COUNT: CPT | Performed by: EMERGENCY MEDICINE

## 2023-03-16 PROCEDURE — 99285 EMERGENCY DEPT VISIT HI MDM: CPT | Mod: 25 | Performed by: EMERGENCY MEDICINE

## 2023-03-16 PROCEDURE — 250N000011 HC RX IP 250 OP 636: Performed by: EMERGENCY MEDICINE

## 2023-03-16 PROCEDURE — 99284 EMERGENCY DEPT VISIT MOD MDM: CPT | Performed by: EMERGENCY MEDICINE

## 2023-03-16 PROCEDURE — U0003 INFECTIOUS AGENT DETECTION BY NUCLEIC ACID (DNA OR RNA); SEVERE ACUTE RESPIRATORY SYNDROME CORONAVIRUS 2 (SARS-COV-2) (CORONAVIRUS DISEASE [COVID-19]), AMPLIFIED PROBE TECHNIQUE, MAKING USE OF HIGH THROUGHPUT TECHNOLOGIES AS DESCRIBED BY CMS-2020-01-R: HCPCS | Performed by: EMERGENCY MEDICINE

## 2023-03-16 PROCEDURE — 82075 ASSAY OF BREATH ETHANOL: CPT | Performed by: EMERGENCY MEDICINE

## 2023-03-16 PROCEDURE — 250N000013 HC RX MED GY IP 250 OP 250 PS 637: Performed by: EMERGENCY MEDICINE

## 2023-03-16 PROCEDURE — 36415 COLL VENOUS BLD VENIPUNCTURE: CPT | Performed by: EMERGENCY MEDICINE

## 2023-03-16 PROCEDURE — 258N000003 HC RX IP 258 OP 636: Performed by: EMERGENCY MEDICINE

## 2023-03-16 PROCEDURE — 128N000004 HC R&B CD ADULT

## 2023-03-16 PROCEDURE — 80053 COMPREHEN METABOLIC PANEL: CPT | Performed by: EMERGENCY MEDICINE

## 2023-03-16 PROCEDURE — C9803 HOPD COVID-19 SPEC COLLECT: HCPCS | Performed by: EMERGENCY MEDICINE

## 2023-03-16 PROCEDURE — 83735 ASSAY OF MAGNESIUM: CPT | Performed by: EMERGENCY MEDICINE

## 2023-03-16 RX ORDER — MAGNESIUM HYDROXIDE/ALUMINUM HYDROXICE/SIMETHICONE 120; 1200; 1200 MG/30ML; MG/30ML; MG/30ML
30 SUSPENSION ORAL EVERY 4 HOURS PRN
Status: DISCONTINUED | OUTPATIENT
Start: 2023-03-16 | End: 2023-03-18 | Stop reason: HOSPADM

## 2023-03-16 RX ORDER — ESCITALOPRAM OXALATE 5 MG/1
1 TABLET ORAL DAILY
Status: ON HOLD | COMMUNITY
Start: 2022-09-09 | End: 2023-03-18

## 2023-03-16 RX ORDER — DIAZEPAM 5 MG
5-20 TABLET ORAL EVERY 30 MIN PRN
Status: DISCONTINUED | OUTPATIENT
Start: 2023-03-16 | End: 2023-03-16

## 2023-03-16 RX ORDER — TRAZODONE HYDROCHLORIDE 50 MG/1
50 TABLET, FILM COATED ORAL
Status: ON HOLD | COMMUNITY
Start: 2022-05-16 | End: 2023-08-10

## 2023-03-16 RX ORDER — AMOXICILLIN 250 MG
1 CAPSULE ORAL 2 TIMES DAILY PRN
Status: DISCONTINUED | OUTPATIENT
Start: 2023-03-16 | End: 2023-03-18 | Stop reason: HOSPADM

## 2023-03-16 RX ORDER — HYDROXYZINE HYDROCHLORIDE 25 MG/1
25 TABLET, FILM COATED ORAL EVERY 4 HOURS PRN
Status: DISCONTINUED | OUTPATIENT
Start: 2023-03-16 | End: 2023-03-18 | Stop reason: HOSPADM

## 2023-03-16 RX ORDER — FOLIC ACID 1 MG/1
1 TABLET ORAL DAILY
Status: DISCONTINUED | OUTPATIENT
Start: 2023-03-17 | End: 2023-03-18 | Stop reason: HOSPADM

## 2023-03-16 RX ORDER — SODIUM BICARBONATE 650 MG/1
1 TABLET ORAL 2 TIMES DAILY
COMMUNITY
Start: 2023-02-03 | End: 2023-08-08

## 2023-03-16 RX ORDER — ATENOLOL 50 MG/1
50 TABLET ORAL DAILY PRN
Status: DISCONTINUED | OUTPATIENT
Start: 2023-03-16 | End: 2023-03-18 | Stop reason: HOSPADM

## 2023-03-16 RX ORDER — ALLOPURINOL 100 MG/1
50 TABLET ORAL
Status: CANCELLED | OUTPATIENT
Start: 2023-03-16

## 2023-03-16 RX ORDER — TRAZODONE HYDROCHLORIDE 50 MG/1
50 TABLET, FILM COATED ORAL
Status: DISCONTINUED | OUTPATIENT
Start: 2023-03-16 | End: 2023-03-18 | Stop reason: HOSPADM

## 2023-03-16 RX ORDER — MULTIPLE VITAMINS W/ MINERALS TAB 9MG-400MCG
1 TAB ORAL DAILY
Status: DISCONTINUED | OUTPATIENT
Start: 2023-03-17 | End: 2023-03-18 | Stop reason: HOSPADM

## 2023-03-16 RX ORDER — IBUPROFEN 200 MG
500 CAPSULE ORAL DAILY
Status: CANCELLED | OUTPATIENT
Start: 2023-03-16

## 2023-03-16 RX ORDER — LORAZEPAM 1 MG/1
1-4 TABLET ORAL EVERY 30 MIN PRN
Status: DISCONTINUED | OUTPATIENT
Start: 2023-03-16 | End: 2023-03-18 | Stop reason: HOSPADM

## 2023-03-16 RX ADMIN — FOLIC ACID: 5 INJECTION, SOLUTION INTRAMUSCULAR; INTRAVENOUS; SUBCUTANEOUS at 15:47

## 2023-03-16 RX ADMIN — DIAZEPAM 10 MG: 5 TABLET ORAL at 15:57

## 2023-03-16 RX ADMIN — DIAZEPAM 10 MG: 5 TABLET ORAL at 20:20

## 2023-03-16 ASSESSMENT — ACTIVITIES OF DAILY LIVING (ADL)
ADLS_ACUITY_SCORE: 35
TOILETING_ISSUES: NO
ADLS_ACUITY_SCORE: 41
ADLS_ACUITY_SCORE: 30
ADLS_ACUITY_SCORE: 30
WALKING_OR_CLIMBING_STAIRS_DIFFICULTY: NO
CHANGE_IN_FUNCTIONAL_STATUS_SINCE_ONSET_OF_CURRENT_ILLNESS/INJURY: NO
ADLS_ACUITY_SCORE: 35
DOING_ERRANDS_INDEPENDENTLY_DIFFICULTY: NO
DIFFICULTY_EATING/SWALLOWING: NO
CONCENTRATING,_REMEMBERING_OR_MAKING_DECISIONS_DIFFICULTY: NO
WEAR_GLASSES_OR_BLIND: YES
DRESSING/BATHING_DIFFICULTY: NO
FALL_HISTORY_WITHIN_LAST_SIX_MONTHS: NO
ADLS_ACUITY_SCORE: 35

## 2023-03-16 NOTE — ED NOTES
AIDET: done    W/ pt: clothing, cellphone    Locker: shoes, red bag, wallet, jacket    Security: none    Wallet: yes  Cellphone: yes

## 2023-03-16 NOTE — ED PROVIDER NOTES
ED Provider Note  [facility]      History     Chief Complaint   Patient presents with     Drug / Alcohol Assessment     HPI  Monty Cox is a 57 year old male who presents to the emergency department seeking detox from alcohol.  Patient states he has been drinking alcohol daily for the past month.  He states he drinks a pint and a half of vodka.  He becomes tremulous if he does not drink.  He does have a history of withdrawal seizure years ago.  He denies a history of DTs.  Patient does endorse depression but denies suicidal ideation.  He does have a history of diabetes and depression but has not taken his medications as he ran out of refills.  He denies any recent illness or medical concerns.  Patient denies any other substance use.    Past Medical History  History reviewed. No pertinent past medical history.  History reviewed. No pertinent surgical history.  ACCU-CHEK GUIDE test strip  acetaminophen (TYLENOL) 325 MG tablet  allopurinol (ZYLOPRIM) 100 MG tablet  allopurinol (ZYLOPRIM) 100 MG tablet  blood glucose (ACCU-CHEK GUIDE) test strip  blood glucose monitoring (ACCU-CHEK ERIKA PLUS) meter device kit  blood glucose monitoring (SOFTCLIX) lancets  blood glucose monitoring (SOFTCLIX) lancets  Blood Glucose Monitoring Suppl (ACCU-CHEK GUIDE) w/Device KIT  buPROPion (WELLBUTRIN XL) 150 MG 24 hr tablet  calcium carbonate 500 mg, elemental, (OSCAL 500) 1250 (500 Ca) MG TABS tablet  darbepoetin veronika (ARANESP) 40 MCG/ML injection  ergocalciferol (ERGOCALCIFEROL) 1.25 MG (52003 UT) capsule  furosemide (LASIX) 80 MG tablet  furosemide (LASIX) 80 MG tablet  gabapentin (NEURONTIN) 100 MG capsule  lactase (LACTAID) 3000 UNIT tablet  multivitamin RENAL (NEPHROCAPS/TRIPHROCAPS) 1 MG capsule      No Known Allergies  Family History  Family History   Problem Relation Age of Onset     Substance Abuse Father      Substance Abuse Nephew      Social History   Social History     Tobacco Use      Smoking status: Never     Smokeless tobacco: Never         A medically appropriate review of systems was performed with pertinent positives and negatives noted in the HPI, and all other systems negative.    Physical Exam   BP: 134/82  Pulse: 87  Temp: 98.5  F (36.9  C)  Resp: 16  Weight: 73.5 kg (162 lb)  SpO2: 99 %  Physical Exam  Vitals and nursing note reviewed.   Constitutional:       General: He is not in acute distress.     Appearance: Normal appearance. He is not diaphoretic.   HENT:      Head: Normocephalic and atraumatic.   Eyes:      General: No scleral icterus.     Pupils: Pupils are equal, round, and reactive to light.   Cardiovascular:      Rate and Rhythm: Normal rate and regular rhythm.      Pulses: Normal pulses.      Heart sounds: Normal heart sounds.   Pulmonary:      Effort: Pulmonary effort is normal. No respiratory distress.      Breath sounds: Normal breath sounds.   Abdominal:      General: Bowel sounds are normal.      Palpations: Abdomen is soft.      Tenderness: There is no abdominal tenderness.   Musculoskeletal:         General: No tenderness. Normal range of motion.   Skin:     General: Skin is warm.      Findings: No rash.   Neurological:      General: No focal deficit present.      Mental Status: He is alert.      Motor: No weakness.      Gait: Gait normal.   Psychiatric:         Mood and Affect: Affect is blunt.         Thought Content: Thought content does not include suicidal ideation.           ED Course, Procedures, & Data      Procedures             Care everywhere reviewed.  Patient's hemoglobin was 9.4 in June of last year at [facility].  Calcium level was 7.8.  Creatinine was 1.7.    Results for orders placed or performed during the hospital encounter of 03/16/23   Asymptomatic COVID-19 Virus (Coronavirus) by PCR Nasopharyngeal     Status: Normal    Specimen: Nasopharyngeal; Swab   Result Value Ref Range    SARS CoV2 PCR Negative Negative    Narrative    Testing was performed using  the Xpert Xpress SARS-CoV-2 Assay on the Cepheid Gene-Xpert Instrument Systems. Additional information about this Emergency Use Authorization (EUA) assay can be found via the Lab Guide. This test should be ordered for the detection of SARS-CoV-2 in individuals who meet SARS-CoV-2 clinical and/or epidemiological criteria as well as from individuals without symptoms or other reasons to suspect COVID-19. Test performance for asymptomatic patients has only been established in anterior nasal swab specimens. This test is for in vitro diagnostic use under the FDA EUA for laboratories certified under CLIA to perform high complexity testing. This test has not been FDA cleared or approved. A negative result does not rule out the presence of PCR inhibitors in the specimen or target RNA concentration below the limit of detection for the assay. The possibility of a false negative should be considered if the patient's recent exposure or clinical presentation suggests COVID-19. This test was validated by the [facility]. This laboratory is certified under the Clinical Laboratory Improvement Amendments (CLIA) as qualified to perform high complexity laboratory testing.     Comprehensive metabolic panel     Status: Abnormal   Result Value Ref Range    Sodium 141 136 - 145 mmol/L    Potassium 3.6 3.4 - 5.3 mmol/L    Chloride 100 98 - 107 mmol/L    Carbon Dioxide (CO2) 20 (L) 22 - 29 mmol/L    Anion Gap 21 (H) 7 - 15 mmol/L    Urea Nitrogen 21.8 (H) 6.0 - 20.0 mg/dL    Creatinine 1.30 (H) 0.67 - 1.17 mg/dL    Calcium 7.8 (L) 8.6 - 10.0 mg/dL    Glucose 112 (H) 70 - 99 mg/dL    Alkaline Phosphatase 130 (H) 40 - 129 U/L     (H) 10 - 50 U/L    ALT 18 10 - 50 U/L    Protein Total 7.7 6.4 - 8.3 g/dL    Albumin 3.7 3.5 - 5.2 g/dL    Bilirubin Total 0.3 <=1.2 mg/dL    GFR Estimate 64 >60 mL/min/1.73m2   Magnesium     Status: Abnormal   Result Value Ref Range    Magnesium 1.3 (L) 1.7 - 2.3 mg/dL   CBC  with platelets and differential     Status: Abnormal   Result Value Ref Range    WBC Count 4.0 4.0 - 11.0 10e3/uL    RBC Count 2.64 (L) 4.40 - 5.90 10e6/uL    Hemoglobin 8.9 (L) 13.3 - 17.7 g/dL    Hematocrit 26.9 (L) 40.0 - 53.0 %     (H) 78 - 100 fL    MCH 33.7 (H) 26.5 - 33.0 pg    MCHC 33.1 31.5 - 36.5 g/dL    RDW 15.1 (H) 10.0 - 15.0 %    Platelet Count 209 150 - 450 10e3/uL    % Neutrophils 58 %    % Lymphocytes 29 %    % Monocytes 10 %    % Eosinophils 1 %    % Basophils 1 %    % Immature Granulocytes 1 %    NRBCs per 100 WBC 0 <1 /100    Absolute Neutrophils 2.4 1.6 - 8.3 10e3/uL    Absolute Lymphocytes 1.2 0.8 - 5.3 10e3/uL    Absolute Monocytes 0.4 0.0 - 1.3 10e3/uL    Absolute Eosinophils 0.0 0.0 - 0.7 10e3/uL    Absolute Basophils 0.0 0.0 - 0.2 10e3/uL    Absolute Immature Granulocytes 0.0 <=0.4 10e3/uL    Absolute NRBCs 0.0 10e3/uL   Alcohol breath test POCT     Status: Abnormal   Result Value Ref Range    Alcohol Breath Test 0.306 (A) 0.00 - 0.01   CBC with platelets differential     Status: Abnormal    Narrative    The following orders were created for panel order CBC with platelets differential.  Procedure                               Abnormality         Status                     ---------                               -----------         ------                     CBC with platelets and d...[[id number]]  Abnormal            Final result                 Please view results for these tests on the individual orders.         Results for orders placed or performed during the hospital encounter of 03/16/23   Asymptomatic COVID-19 Virus (Coronavirus) by PCR Nasopharyngeal     Status: Normal    Specimen: Nasopharyngeal; Swab   Result Value Ref Range    SARS CoV2 PCR Negative Negative    Narrative    Testing was performed using the Xpert Xpress SARS-CoV-2 Assay on the Cepheid Gene-Xpert Instrument Systems. Additional information about this Emergency Use Authorization (EUA) assay can be found via the  Lab Guide. This test should be ordered for the detection of SARS-CoV-2 in individuals who meet SARS-CoV-2 clinical and/or epidemiological criteria as well as from individuals without symptoms or other reasons to suspect COVID-19. Test performance for asymptomatic patients has only been established in anterior nasal swab specimens. This test is for in vitro diagnostic use under the FDA EUA for laboratories certified under CLIA to perform high complexity testing. This test has not been FDA cleared or approved. A negative result does not rule out the presence of PCR inhibitors in the specimen or target RNA concentration below the limit of detection for the assay. The possibility of a false negative should be considered if the patient's recent exposure or clinical presentation suggests COVID-19. This test was validated by the [facility]. This laboratory is certified under the Clinical Laboratory Improvement Amendments (CLIA) as qualified to perform high complexity laboratory testing.     Comprehensive metabolic panel     Status: Abnormal   Result Value Ref Range    Sodium 141 136 - 145 mmol/L    Potassium 3.6 3.4 - 5.3 mmol/L    Chloride 100 98 - 107 mmol/L    Carbon Dioxide (CO2) 20 (L) 22 - 29 mmol/L    Anion Gap 21 (H) 7 - 15 mmol/L    Urea Nitrogen 21.8 (H) 6.0 - 20.0 mg/dL    Creatinine 1.30 (H) 0.67 - 1.17 mg/dL    Calcium 7.8 (L) 8.6 - 10.0 mg/dL    Glucose 112 (H) 70 - 99 mg/dL    Alkaline Phosphatase 130 (H) 40 - 129 U/L     (H) 10 - 50 U/L    ALT 18 10 - 50 U/L    Protein Total 7.7 6.4 - 8.3 g/dL    Albumin 3.7 3.5 - 5.2 g/dL    Bilirubin Total 0.3 <=1.2 mg/dL    GFR Estimate 64 >60 mL/min/1.73m2   Magnesium     Status: Abnormal   Result Value Ref Range    Magnesium 1.3 (L) 1.7 - 2.3 mg/dL   CBC with platelets and differential     Status: Abnormal   Result Value Ref Range    WBC Count 4.0 4.0 - 11.0 10e3/uL    RBC Count 2.64 (L) 4.40 - 5.90 10e6/uL    Hemoglobin 8.9  (L) 13.3 - 17.7 g/dL    Hematocrit 26.9 (L) 40.0 - 53.0 %     (H) 78 - 100 fL    MCH 33.7 (H) 26.5 - 33.0 pg    MCHC 33.1 31.5 - 36.5 g/dL    RDW 15.1 (H) 10.0 - 15.0 %    Platelet Count 209 150 - 450 10e3/uL    % Neutrophils 58 %    % Lymphocytes 29 %    % Monocytes 10 %    % Eosinophils 1 %    % Basophils 1 %    % Immature Granulocytes 1 %    NRBCs per 100 WBC 0 <1 /100    Absolute Neutrophils 2.4 1.6 - 8.3 10e3/uL    Absolute Lymphocytes 1.2 0.8 - 5.3 10e3/uL    Absolute Monocytes 0.4 0.0 - 1.3 10e3/uL    Absolute Eosinophils 0.0 0.0 - 0.7 10e3/uL    Absolute Basophils 0.0 0.0 - 0.2 10e3/uL    Absolute Immature Granulocytes 0.0 <=0.4 10e3/uL    Absolute NRBCs 0.0 10e3/uL   Alcohol breath test POCT     Status: Abnormal   Result Value Ref Range    Alcohol Breath Test 0.306 (A) 0.00 - 0.01   CBC with platelets differential     Status: Abnormal    Narrative    The following orders were created for panel order CBC with platelets differential.  Procedure                               Abnormality         Status                     ---------                               -----------         ------                     CBC with platelets and d...[[id number]]  Abnormal            Final result                 Please view results for these tests on the individual orders.     Medications   sodium chloride 0.9 % 1,000 mL with Infuvite Adult 10 mL, thiamine 100 mg, folic acid 1 mg, magnesium sulfate 2 g infusion (has no administration in time range)     Labs Ordered and Resulted from Time of ED Arrival to Time of ED Departure   COMPREHENSIVE METABOLIC PANEL - Abnormal       Result Value    Sodium 141      Potassium 3.6      Chloride 100      Carbon Dioxide (CO2) 20 (*)     Anion Gap 21 (*)     Urea Nitrogen 21.8 (*)     Creatinine 1.30 (*)     Calcium 7.8 (*)     Glucose 112 (*)     Alkaline Phosphatase 130 (*)      (*)     ALT 18      Protein Total 7.7      Albumin 3.7      Bilirubin Total 0.3      GFR Estimate 64      MAGNESIUM - Abnormal    Magnesium 1.3 (*)    CBC WITH PLATELETS AND DIFFERENTIAL - Abnormal    WBC Count 4.0      RBC Count 2.64 (*)     Hemoglobin 8.9 (*)     Hematocrit 26.9 (*)      (*)     MCH 33.7 (*)     MCHC 33.1      RDW 15.1 (*)     Platelet Count 209      % Neutrophils 58      % Lymphocytes 29      % Monocytes 10      % Eosinophils 1      % Basophils 1      % Immature Granulocytes 1      NRBCs per 100 WBC 0      Absolute Neutrophils 2.4      Absolute Lymphocytes 1.2      Absolute Monocytes 0.4      Absolute Eosinophils 0.0      Absolute Basophils 0.0      Absolute Immature Granulocytes 0.0      Absolute NRBCs 0.0     ALCOHOL BREATH TEST POCT - Abnormal    Alcohol Breath Test 0.306 (*)    COVID-19 VIRUS (CORONAVIRUS) BY PCR - Normal    SARS CoV2 PCR Negative       No orders to display          Critical care was not performed.     Medical Decision Making  The patient's presentation was of moderate complexity (a chronic illness mild to moderate exacerbation, progression, or side effect of treatment).    The patient's evaluation involved:  review of external note(s) from 1 sources ([facility])  ordering and/or review of 3+ test(s) in this encounter (see separate area of note for details)  review of 3+ test result(s) ordered prior to this encounter (see separate area of note for details)    The patient's management necessitated high risk (a decision regarding hospitalization).      Assessment & Plan    57 year old male to the emergency department seeking detox from alcohol.  Is been drinking alcohol daily for the past month and gets tremulous when he does not drink.  Additionally, he has 1 previous episode of withdrawal seizure years ago.  The patient arrived with significant elevated alcohol level of 0.306.  The patient does not have any acute medical concerns.  He does have a history of diabetes as well as renal insufficiency.  His labs today are abnormal at their baseline with an elevated creatinine as  well as mild hypocalcemia consistent with previous results-likely related to his renal disease.  His AST is elevated to 184 but his liver enzymes are otherwise normal.  He is hypomagnesemic and is receiving a banana bag with magnesium sulfate supplementation for correction.  The patient appears medically stable for detox admission.    I have reviewed the nursing notes. I have reviewed the findings, diagnosis, plan and need for follow up with the patient.    New Prescriptions    No medications on file       Final diagnoses:   Alcohol dependence with uncomplicated intoxication (H)   Hypomagnesemia     Chart documentation was completed with Dragon voice-recognition software. Even though reviewed, this chart may still contain some grammatical, spelling, and word errors.     Nino Dent Md  [facility] EMERGENCY DEPARTMENT  3/16/2023     Nino Dent MD  03/16/23 4789

## 2023-03-16 NOTE — ED TRIAGE NOTES
"Presents to ED through triage.  Has had alcohol withdrawal seizures in the past and \"I'm scared to go through withdrawals.\"  Last drink was this morning.  PBT 0.306.  Calm, pleasant.  Denies SI/HI.       Triage Assessment     Row Name 03/16/23 1131       Triage Assessment (Adult)    Airway WDL WDL       Respiratory WDL    Respiratory WDL WDL       Skin Circulation/Temperature WDL    Skin Circulation/Temperature WDL WDL       Cardiac WDL    Cardiac WDL WDL       Peripheral/Neurovascular WDL    Peripheral Neurovascular WDL WDL       Cognitive/Neuro/Behavioral WDL    Cognitive/Neuro/Behavioral WDL WDL              "

## 2023-03-16 NOTE — TELEPHONE ENCOUNTER
S: [facility] , Provider Filipe calling at 3:08 PM with clinical on a 57 year old/Male presenting for alcohol detox.     B: Pt presents for ETOH detox.   Currently reports drinking heavily daily for last month. Pint and a half of vodka, tremulous, one   Patient reports last use was shortly before arrival.  Pt BAILEY: 0.306 1130am 3/16/2023   Pt  denies hx of DT  Pt  endorses hx of seizures. Last seizure: years ago  Pt endorsing the following symptoms of withdrawal: None  MSSA Score: n/a    Pt denies acute mental health or medical concerns. Diabetes and renal insufficieny. Patient ran out of medications for renal insufficiency weeks ago. Patient is not feeling acutely ill. Pt does have hx anemia where his hemoglobin was 9.3 in June and 8.9 today, all labs are similar to labs from [facility] detox from June. Today patient's Magnesium level was 1.3 and Dr. Dent will provide banana bag to correct this  Pt denies other drug use: None Amount/frequency: N/A    Does Pt have a detox care plan in [facility]? yes  Does pt present with specific needs, assistive devices, or exclusionary criteria? None  Is the patient ambulating, eating and drinking in the ED? yes    A: Pt meets criteria to be presented for IP detox admission. Patient is voluntary  COVID: Negative  Utox: Ordered, not yet collected  CMP: Abnormalities:     CBC: Abnormalities:     HCG: N/A    R: Patient cleared and ready for behavioral bed placement: Yes    Presenting for admission to 3A/CD    3:13 PM Intake paged Dr. Martinez for review onto 3A for CD admission with Dr. Martinez.     3:20 PM Intake received call from Dr. Martinez who is wondering what labs will be repeated.     3:41 PM Per Dr. Dent, no labs will be repeated.     3:41 PM Intake paged Dr. Martinez with update.     3:43 PM Intake received call from Dr. Martinez that patient has been accepted to 3A CD with Dr. Martinez.     4:27 PM Intake placed patient in queue, set up BH account and completed indicia.      4:27 PM Intake called 3A charge RN with disposition on the patient.

## 2023-03-16 NOTE — PHARMACY-ADMISSION MEDICATION HISTORY
"Admission medication history interview status for the 3/16/2023 admission is complete. See EPIC admission navigator for allergy information, pharmacy, prior to admission medications and immunization status.     Medication history interview sources:  patient, fill history, CareEverywhere    Changes made to PTA medication list (reason)  Added: none  Deleted:   Allopurinol 50mg & 100mg  Oscal 500  Aranesp 40mcg/ml  Ergocalciferol 50,000units  Furosemide 80mg  lactaid 3000 unit  multivitamin  Changed: sodium bicarbonate 650mg once daily-->twice daily    Additional medication history information (including reliability of information, actions taken by pharmacist):  1. Reports \"should be taking Lexapro and gabapentin\". These last filled 9/2022  2. Sodium bicarb and trazodone last filled within past 30 days  3. Chlorthalidone 25mg (#60/30 day supply) last filled 2/2023 but reports he stopped taking it right away    Medication history completed by: Valentina Ashton, PharmD, MPH, MS      Prior to Admission medications    Medication Sig Last Dose Taking? Auth Provider Long Term End Date   escitalopram (LEXAPRO) 5 MG tablet Take 1 tablet by mouth daily Past Month at AM Yes Reported, Patient Yes    gabapentin (NEURONTIN) 100 MG capsule Take 100 mg by mouth 3 times daily Past Month at 1 am;1 pm;1 pm Yes Reported, Patient Yes    sodium bicarbonate 650 MG tablet Take 1 tablet by mouth 2 times daily Past Week at AM Yes Reported, Patient     traZODone (DESYREL) 50 MG tablet Take 50 mg by mouth daily 3/15/2023 at prn Yes Reported, Patient Yes    ACCU-CHEK GUIDE test strip    Reported, Patient     blood glucose (ACCU-CHEK GUIDE) test strip Test 2 times per day. Pharmacy allowed to substitute per patient's insurance.   Reported, Patient     blood glucose monitoring (ACCU-CHEK ERIKA PLUS) meter device kit Use as directed   Reported, Patient     blood glucose monitoring (SOFTCLIX) lancets Test 2 times per day. Call your doctor if blood " glucose is > 300.   Reported, Patient     blood glucose monitoring (SOFTCLIX) lancets    Reported, Patient     Blood Glucose Monitoring Suppl (ACCU-CHEK GUIDE) w/Device KIT    Reported, Patient

## 2023-03-17 PROBLEM — M10.9 POLYARTICULAR GOUT: Status: ACTIVE | Noted: 2021-01-18

## 2023-03-17 PROBLEM — K85.20 ALCOHOL-INDUCED ACUTE PANCREATITIS, UNSPECIFIED COMPLICATION STATUS: Status: ACTIVE | Noted: 2020-12-28

## 2023-03-17 PROBLEM — H52.4 HYPEROPIA OF BOTH EYES WITH ASTIGMATISM AND PRESBYOPIA: Status: ACTIVE | Noted: 2021-03-09

## 2023-03-17 PROBLEM — H52.203 HYPEROPIA OF BOTH EYES WITH ASTIGMATISM AND PRESBYOPIA: Status: ACTIVE | Noted: 2021-03-09

## 2023-03-17 PROBLEM — F10.939 ALCOHOL WITHDRAWAL SYNDROME WITH COMPLICATION (H): Status: ACTIVE | Noted: 2020-12-28

## 2023-03-17 PROBLEM — H52.03 HYPEROPIA OF BOTH EYES WITH ASTIGMATISM AND PRESBYOPIA: Status: ACTIVE | Noted: 2021-03-09

## 2023-03-17 PROBLEM — N17.9 AKI (ACUTE KIDNEY INJURY) (H): Status: ACTIVE | Noted: 2020-12-28

## 2023-03-17 PROBLEM — M25.649 FINGER STIFFNESS: Status: ACTIVE | Noted: 2017-02-08

## 2023-03-17 PROBLEM — E87.20 METABOLIC ACIDOSIS: Status: ACTIVE | Noted: 2022-05-20

## 2023-03-17 PROBLEM — D72.825 BANDEMIA: Status: ACTIVE | Noted: 2021-01-18

## 2023-03-17 PROBLEM — F33.1 MODERATE EPISODE OF RECURRENT MAJOR DEPRESSIVE DISORDER (H): Status: ACTIVE | Noted: 2020-04-21

## 2023-03-17 PROBLEM — M10.9 GOUT: Status: ACTIVE | Noted: 2018-12-05

## 2023-03-17 PROBLEM — H25.9 AGE-RELATED CATARACT OF BOTH EYES: Status: ACTIVE | Noted: 2021-03-09

## 2023-03-17 LAB
ANION GAP SERPL CALCULATED.3IONS-SCNC: 16 MMOL/L (ref 7–15)
BUN SERPL-MCNC: 22.6 MG/DL (ref 6–20)
CALCIUM SERPL-MCNC: 8.3 MG/DL (ref 8.6–10)
CHLORIDE SERPL-SCNC: 96 MMOL/L (ref 98–107)
CHOLEST SERPL-MCNC: 224 MG/DL
CREAT SERPL-MCNC: 1.25 MG/DL (ref 0.67–1.17)
DEPRECATED HCO3 PLAS-SCNC: 22 MMOL/L (ref 22–29)
GFR SERPL CREATININE-BSD FRML MDRD: 68 ML/MIN/1.73M2
GGT SERPL-CCNC: 293 U/L (ref 8–61)
GLUCOSE BLDC GLUCOMTR-MCNC: 104 MG/DL (ref 70–99)
GLUCOSE BLDC GLUCOMTR-MCNC: 90 MG/DL (ref 70–99)
GLUCOSE SERPL-MCNC: 126 MG/DL (ref 70–99)
HBA1C MFR BLD: 5.7 %
HDLC SERPL-MCNC: 70 MG/DL
LDLC SERPL CALC-MCNC: 131 MG/DL
LIPASE SERPL-CCNC: 47 U/L (ref 13–60)
MAGNESIUM SERPL-MCNC: 1.4 MG/DL (ref 1.7–2.3)
NONHDLC SERPL-MCNC: 154 MG/DL
POTASSIUM SERPL-SCNC: 4.3 MMOL/L (ref 3.4–5.3)
SODIUM SERPL-SCNC: 134 MMOL/L (ref 136–145)
TRIGL SERPL-MCNC: 116 MG/DL
TSH SERPL DL<=0.005 MIU/L-ACNC: 1.65 UIU/ML (ref 0.3–4.2)
URATE SERPL-MCNC: 11.5 MG/DL (ref 3.4–7)

## 2023-03-17 PROCEDURE — 250N000013 HC RX MED GY IP 250 OP 250 PS 637: Performed by: NURSE PRACTITIONER

## 2023-03-17 PROCEDURE — 84550 ASSAY OF BLOOD/URIC ACID: CPT

## 2023-03-17 PROCEDURE — 80048 BASIC METABOLIC PNL TOTAL CA: CPT

## 2023-03-17 PROCEDURE — 99223 1ST HOSP IP/OBS HIGH 75: CPT | Mod: AI | Performed by: PSYCHIATRY & NEUROLOGY

## 2023-03-17 PROCEDURE — 128N000004 HC R&B CD ADULT

## 2023-03-17 PROCEDURE — 250N000013 HC RX MED GY IP 250 OP 250 PS 637

## 2023-03-17 PROCEDURE — 99222 1ST HOSP IP/OBS MODERATE 55: CPT

## 2023-03-17 PROCEDURE — 36415 COLL VENOUS BLD VENIPUNCTURE: CPT | Performed by: PSYCHIATRY & NEUROLOGY

## 2023-03-17 PROCEDURE — 82977 ASSAY OF GGT: CPT | Performed by: PSYCHIATRY & NEUROLOGY

## 2023-03-17 PROCEDURE — H2032 ACTIVITY THERAPY, PER 15 MIN: HCPCS

## 2023-03-17 PROCEDURE — 83735 ASSAY OF MAGNESIUM: CPT

## 2023-03-17 PROCEDURE — 84443 ASSAY THYROID STIM HORMONE: CPT | Performed by: PSYCHIATRY & NEUROLOGY

## 2023-03-17 PROCEDURE — 83036 HEMOGLOBIN GLYCOSYLATED A1C: CPT | Performed by: PSYCHIATRY & NEUROLOGY

## 2023-03-17 PROCEDURE — 250N000013 HC RX MED GY IP 250 OP 250 PS 637: Performed by: PSYCHIATRY & NEUROLOGY

## 2023-03-17 PROCEDURE — HZ2ZZZZ DETOXIFICATION SERVICES FOR SUBSTANCE ABUSE TREATMENT: ICD-10-PCS | Performed by: PSYCHIATRY & NEUROLOGY

## 2023-03-17 PROCEDURE — 80061 LIPID PANEL: CPT | Performed by: PSYCHIATRY & NEUROLOGY

## 2023-03-17 PROCEDURE — 83690 ASSAY OF LIPASE: CPT

## 2023-03-17 RX ORDER — NICOTINE POLACRILEX 4 MG
15-30 LOZENGE BUCCAL
Status: DISCONTINUED | OUTPATIENT
Start: 2023-03-17 | End: 2023-03-18 | Stop reason: HOSPADM

## 2023-03-17 RX ORDER — DEXTROSE MONOHYDRATE 25 G/50ML
25-50 INJECTION, SOLUTION INTRAVENOUS
Status: DISCONTINUED | OUTPATIENT
Start: 2023-03-17 | End: 2023-03-18 | Stop reason: HOSPADM

## 2023-03-17 RX ORDER — MAGNESIUM OXIDE 400 MG/1
400 TABLET ORAL 4 TIMES DAILY
Status: COMPLETED | OUTPATIENT
Start: 2023-03-17 | End: 2023-03-18

## 2023-03-17 RX ORDER — IBUPROFEN 400 MG/1
400 TABLET, FILM COATED ORAL EVERY 6 HOURS PRN
Status: COMPLETED | OUTPATIENT
Start: 2023-03-17 | End: 2023-03-18

## 2023-03-17 RX ORDER — SODIUM BICARBONATE 650 MG/1
650 TABLET ORAL 2 TIMES DAILY
Status: DISCONTINUED | OUTPATIENT
Start: 2023-03-17 | End: 2023-03-18 | Stop reason: HOSPADM

## 2023-03-17 RX ORDER — TRAZODONE HYDROCHLORIDE 50 MG/1
50 TABLET, FILM COATED ORAL DAILY
Status: DISCONTINUED | OUTPATIENT
Start: 2023-03-17 | End: 2023-03-17

## 2023-03-17 RX ORDER — ESCITALOPRAM OXALATE 5 MG/1
5 TABLET ORAL DAILY
Status: DISCONTINUED | OUTPATIENT
Start: 2023-03-17 | End: 2023-03-18 | Stop reason: HOSPADM

## 2023-03-17 RX ADMIN — MAGNESIUM OXIDE TAB 400 MG (241.3 MG ELEMENTAL MG) 400 MG: 400 (241.3 MG) TAB at 20:33

## 2023-03-17 RX ADMIN — MULTIPLE VITAMINS W/ MINERALS TAB 1 TABLET: TAB at 08:42

## 2023-03-17 RX ADMIN — IBUPROFEN 400 MG: 400 TABLET ORAL at 22:24

## 2023-03-17 RX ADMIN — SODIUM BICARBONATE 650 MG TABLET 650 MG: at 20:31

## 2023-03-17 RX ADMIN — HYDROXYZINE HYDROCHLORIDE 25 MG: 25 TABLET, FILM COATED ORAL at 08:45

## 2023-03-17 RX ADMIN — HYDROXYZINE HYDROCHLORIDE 25 MG: 25 TABLET, FILM COATED ORAL at 16:10

## 2023-03-17 RX ADMIN — HYDROXYZINE HYDROCHLORIDE 25 MG: 25 TABLET, FILM COATED ORAL at 12:15

## 2023-03-17 RX ADMIN — THIAMINE HCL TAB 100 MG 100 MG: 100 TAB at 08:42

## 2023-03-17 RX ADMIN — SODIUM BICARBONATE 650 MG TABLET 650 MG: at 10:39

## 2023-03-17 RX ADMIN — MAGNESIUM OXIDE TAB 400 MG (241.3 MG ELEMENTAL MG) 400 MG: 400 (241.3 MG) TAB at 16:10

## 2023-03-17 RX ADMIN — ESCITALOPRAM OXALATE 5 MG: 5 TABLET, FILM COATED ORAL at 10:30

## 2023-03-17 RX ADMIN — LORAZEPAM 2 MG: 1 TABLET ORAL at 00:57

## 2023-03-17 RX ADMIN — TRAZODONE HYDROCHLORIDE 50 MG: 50 TABLET ORAL at 01:00

## 2023-03-17 RX ADMIN — FOLIC ACID 1 MG: 1 TABLET ORAL at 08:42

## 2023-03-17 RX ADMIN — MAGNESIUM OXIDE TAB 400 MG (241.3 MG ELEMENTAL MG) 400 MG: 400 (241.3 MG) TAB at 12:15

## 2023-03-17 RX ADMIN — TRAZODONE HYDROCHLORIDE 50 MG: 50 TABLET ORAL at 20:33

## 2023-03-17 ASSESSMENT — ACTIVITIES OF DAILY LIVING (ADL)
DRESS: INDEPENDENT
ADLS_ACUITY_SCORE: 30
LAUNDRY: WITH SUPERVISION
HYGIENE/GROOMING: INDEPENDENT
ADLS_ACUITY_SCORE: 30
ADLS_ACUITY_SCORE: 30
ORAL_HYGIENE: INDEPENDENT
ADLS_ACUITY_SCORE: 30

## 2023-03-17 NOTE — CONSULTS
[facility]  Consult Note - Hospitalist Service  Date of Admission:  3/16/2023  Consult Requested by: Dr. Mratinez   Reason for Consult: Detox, routine     Assessment & Plan   Monty Cox is a 57 year old male admitted on 3/16/2023 for detox from alcohol. He a past medical history of alcohol use disorder, gout, CKD stage 3b previously on HD, HTN, pancreatitis, prediabetes, MDD, PILI, and gout.  Sent has been consulted for comanagement and detox.    Medicine will continue to follow to review OSH records for CKD and lyte abnormalities.     Acute Alcohol Withdrawal  Alcohol Use Disorder  Alcohol Withdrawal Seizure hx  Last drink on prior to admission. Drinks a pint and a half of vodka daily for the past month. No history of seizures or DT's. Blood alcohol level of 0.306. GGT of 293.Utox negative. Alk phos WNL.Total bili WNL. AST: ALT of 184:18, in 2:1 pattern with alcohol use. Alk phos 130. MSSA scores of 5-8 in past 24 hrs.   - Management per Psychiatry  - No need for BMP/hepatic panel unless clinically indicated   - Agree with MSSA protocol  - Agree with using Ativan  - Agree with MVI, folate, thiamine     Alcoholic Pancreatitis hx: No current pain.  Has been hospitalized in the past.  Lipase within normal.  - Monitor     CKD stage 3b  Metabolic acidosis hx  HTN  Blood pressure ranging in the 140s/80s to 90s.  Per Pharm review patient has been on Lasix 80 mg in the past. Recently, chlorthalidone 25 mg filled on 2/23 but patient reports he stopped taking it right away.  Patient believes that his blood pressure was elevated as an appointment as he had been late and had been rushing to it.  He reports he has not taken it since.  Does not have a blood pressure cuff at home.  Hx of  CKD stage 5, but now 3b Patient has been on dialysis in the past but his kidney function resolved per pt and he has not needed dialysis for around 1-1/2 years. He still has a patent left  "fistula. Cr baseline ~1.38-2.05, GFR 21-61. See trends below. Follows with Nephrology [facility]. Last appointment 6/2022. Cr is within his normal range and appears euvolemic. Mildly hypertensive prior to treatment, but now down trending.   - Restart Sodium Bicarb 650 mg BID  - Monitor BP  - Restart hydrochlorothiazide if trends remain elevated  - Always recheck BP if high or low and correlate with clinical situation  - Treat pain, anxiety, and any withdrawal s/s if present  - Notify provider if SBP >170 or DBP >110 after careful reassessment, treatment of pain/anxiety/withdrawal/home PTA med adminstration  - Follow-up OP in 1-2 weeks with [facility] Nephrology   - Avoid nephrotoxic medication if available   - Ok to restart gabapentin 100 mg TID if needed for mental health    Prediabetes (A1c of 5.7 on  3/17/23): Not on any PTA medications. Appears to be checking once daily BG at home.  Reports blood sugars trend in the 90s at home.  - A1c pending  - POCT BG daily.   - Hypoglycemia protocol  - Monitor    Gout: No current flares, may have had one in hands from last week. Has been on allopurinol 50 mg & 100 mg in the past.  Patient states that he was taken off of it due to concerns with his kidney function.   -Uric acid level pending   -Monitor  -Sitter starting a 50 mg allopurinol daily if uric acid remains elevated    Normocytic Anemia likely 2/2 chronic disease: See trend below. Hgb trends over the past years from 9.4-11.6. Per Pharm rec, was on Ergocalciferol 50,000 units at one time.   -Monitor  - Follow-up OP with Nephrology     Hemoglobin   Date Value Ref Range Status   03/16/2023 8.9 (L) 13.3 - 17.7 g/dL Final       MDD  PILI: Denies SI/SIB/HI. PTA meds include Lexapro and gabapentin, last filled on 9/2022. Pt states he \"should be taking, but is not.\"  - Per Psychiatry     Age-related cataracts of both eyes: Mild cataracts in both eyes, left greater than right.  -Follow-up OP    Hypomagnesia: Likely 2/2 to Etoh " withdrawal/poor PO intake/CKD. 1.3 on admission. Replacement given.Recheck at 1.4.  - 400 mg QID for 1 day  - Recheck in AM    Hyponatremia: 134 on labs. Likely d/t poor PO intake/Etoh usage/possibly due to CKD.   -Encourage intake  - Recheck in AM    HAGMA: 21 upon admission. Suspect 2/2 Etoh usage.   - Recheck at 16, trending down  - Recheck in AM     The patient's care was discussed with the Bedside Nurse, Patient and Primary team.    Clinically Significant Risk Factors Present on Admission          # Hypocalcemia: Lowest Ca = 7.8 mg/dL in last 2 days, will monitor and replace as appropriate   # Hypomagnesemia: Lowest Mg = 1.3 mg/dL in last 2 days, will replace as needed  # Anion Gap Metabolic Acidosis: Highest Anion Gap = 21 mmol/L in last 2 days, will monitor and treat as appropriate                   [facility]  Hospitalist Service  Securely message with Vocera (more info)  Text page via [facility] Paging/Directory   ______________________________________________________________________    Chief Complaint   Acute alcohol withdrawal  Alcohol use disorder    History is obtained from the patient and via chat review.     History of Present Illness   Monty Cox is a 57 year old male who presents seeking detox from alcohol. He a past medical history of alcohol use disorder, gout, CKD stage 5? previously on HD, HTN, pancreatitis, prediabetes, MDD, PILI, and gout. Medicine has been consulted for medical co management.      Patient states he has been drinking alcohol daily for the past month.  He states he drinks a pint and a half of vodka.  He becomes tremulous if he does not drink.  He does have a history of withdrawal seizure years ago.  He denies a history of DTs.  Patient does endorse depression but denies suicidal ideation.  He does have a history of diabetes and depression but has not taken his medications as he ran out of refills.  He denies any recent illness or medical concerns. Patient denies  any other substance use.  Breath test was 0.306.  He received a banana bag with magnesium sulfate in the emergency room.    Upon exam, patient states that he has a history of chronic kidney disease stage V.  He was on dialysis but his kidney function improved and he now is no longer on it.  Sees a nephrologist at [facility].  Also start hydrochlorothiazide, but he did not believe he needed it.  He states that he was in a rush to get to his appointment as he was late and thought his blood pressure was elevated due to that.  Patient states that he does take sodium bicarb twice a day.  He checks his blood sugar once a day.  He states that his blood sugars usually run in the 90s.  He states he may have had a gout flare last week.  He is not on any uric acid lowering therapy due to previous history of his kidney function. Denies any headaches, fevers, dysphagia, GERD s/s, SOB, chest pain, N/V, abdominal pain, dysuria, back pain or leg swelling.     Past Medical History    History reviewed. No pertinent past medical history.    Past Surgical History   History reviewed. No pertinent surgical history.    Medications   I have reviewed this patient's current medications  Medications Prior to Admission   Medication Sig Dispense Refill Last Dose     escitalopram (LEXAPRO) 5 MG tablet Take 1 tablet by mouth daily   Past Month at AM     gabapentin (NEURONTIN) 100 MG capsule Take 100 mg by mouth 3 times daily   Past Month at 1 am;1 pm;1 pm     sodium bicarbonate 650 MG tablet Take 1 tablet by mouth 2 times daily   Past Week at AM     traZODone (DESYREL) 50 MG tablet Take 50 mg by mouth daily   3/15/2023 at prn     ACCU-CHEK GUIDE test strip         blood glucose (ACCU-CHEK GUIDE) test strip Test 2 times per day. Pharmacy allowed to substitute per patient's insurance.        blood glucose monitoring (ACCU-CHEK ERIKA PLUS) meter device kit Use as directed        blood glucose monitoring (SOFTCLIX) lancets Test 2 times per day. Call your  doctor if blood glucose is > 300.        blood glucose monitoring (SOFTCLIX) lancets         Blood Glucose Monitoring Suppl (ACCU-CHEK GUIDE) w/Device KIT              Review of Systems    The 10 point Review of Systems is negative other than noted in the HPI or here.     Family History   I have reviewed this patient's family history and updated it with pertinent information if needed.  Family History   Problem Relation Age of Onset     Substance Abuse Father      Substance Abuse Nephew        Allergies   No Known Allergies     Physical Exam   Vital Signs: Temp: 97.8  F (36.6  C) Temp src: Temporal BP: (!) 143/93 Pulse: 92   Resp: 16 SpO2: 97 % O2 Device: None (Room air)    Weight: 162 lbs 0 oz    General Appearance: In NAD, sitting in chair,  Respiratory: LS clear b/l, normal RR  Cardiovascular: S1, S2, no m/r/g, thrill +/bruit+, no peripheral edema  GI: BS+, all 4 quadrants, no masses, non-tender upon palpation  Skin: Intact on face, arms, legs.  No wounds, bruising, or lesions noted.  Other: A&Ox4, moving all extremities, flat affect      Medical Decision Making       65 MINUTES SPENT BY ME on the date of service doing chart review, history, exam, documentation & further activities per the note.      Data     I have personally reviewed the following data over the past 24 hrs:    4.0  \   8.9 (L)   / 209     141 100 21.8 (H) /  90   3.6 20 (L) 1.30 (H) \       ALT: 18 AST: 184 (H) AP: 130 (H) TBILI: 0.3   ALB: 3.7 TOT PROTEIN: 7.7 LIPASE: N/A       Imaging results reviewed over the past 24 hrs:   No results found for this or any previous visit (from the past 24 hour(s)).

## 2023-03-17 NOTE — DISCHARGE INSTRUCTIONS
Behavioral Discharge Planning and Instructions  THANK YOU FOR CHOOSING [address]  [phone]    Summary: You were admitted to [facility] on 03/16/2023 for detoxification from Alcohol .  A medical exam was performed that included lab work. You have met with a  and opted to returning home.  Please take care and make your recovery a daily priority, Monty!  It was a pleasure working with you and the entire treatment team here wishes you the very best in your recovery!     Recommendation:  Please seek CD assessment as needed for extra support and resources. Please follow any and all recommendations as needed and required.     Main Diagnoses:  Per Dr. James Martinez MD;  alochol use dx severe'  Alcohol withdrawal  CKD    HTN    Major Treatments, Procedures and Findings:  You were treated for Alcohol detoxification using  Ativan protocol. As an outpatient you will be prescribed ***, please take this medication as prescribed until it is gone. You did not complete a chemical dependency assessment. You had labs drawn and those results were reviewed with you. Please take a copy of your lab work with you to your next primary care provider appointment.    Symptoms to Report:  If you experience more anxiety, confusion, sleeplessness, deep sadness or thoughts of suicide, notify your treatment team or notify your primary care provider. IF ANY OF THE SYMPTOMS YOU ARE EXPERIENCING ARE A MEDICAL EMERGENCY CALL 911 IMMEDIATELY.     Lifestyle Adjustment:   Health Action Plan:  1.Create a daily schedule  2. Eat Healthy  3. Plan Enjoyable Sober Activities  4. Use Problem Solving Skills and Deal with Issues as they Arise.   5. Be Physically Active  6. Take your medications as prescribed  7. Get enough restful sleep  8. Practice Relaxation  9. Spend time with Supportive People  10. No use of alcohol, illegal drugs or addictive medications other than what is currently prescribed.   11.AA, NA Sponsor are  excellent resources for support  12. Explore how  you can utilize spirituality in your recovery            Disposition: Return home.     Facts about COVID19 at www.cdc.gov/COVID19 and www.[place].gov/covid19    Keeping hands clean is one of the most important steps we can take to avoid getting sick and spreading germs to others.  Please wash your hands frequently and lather with soap for at least 20 seconds!    Follow-up Appointment:   Appointment Date/Time: On Wednesday march 29 2023 at 4 pm with   Primary Care Giver: Jeramie Altamirano APRN, [facility]    [facility]    [address] [address]      Phone Number: [phone]     Recovery apps for your phone to locate current in person and zoom recovery meetings  [place] - meeting bobby  AA  - meeting bobby  Meeting guide - meeting bobby  Quick NA meeting - meeting bobby  Neva- has various apps    Resources:  *due to covid-19 most AA/NA meetings are being held online however some are in-person or a hybrid combination please check online to verify*  Need Support Now? If you or someone you know is struggling or in crisis, help is available. Call or text [employer]8 or chat [employer]org  AA meetings search for them at: https://aa-intergroup.org (worldwide meeting listings)  AA meetings for [place] area can be found online at: https://a[place].org (click local online meetings listings)  NA meetings for [place] area can be found online at: https://www.nam[place].org  (click find a meeting)  AA and NA Sponsors are excellent resources for support and you can find one at any support group meeting.   Alcoholics Anonymous (https://aa.org/): for information 24 hours/day  AA Intergroup service office in [place] (http://www.aastpaul.org/) [phone]  AA Intergroup service office in [facility]: [phone]. (http://www.aamin[place].org/)  Narcotics Anonymous (www.nam[place].org) (124)  031-9225  https://aafairviewriverside.org/meetings  SMART Recovery - self management for addiction recovery:  www.smartrecovery.org  Pathways ~ A Health Crisis Resource & Support Center:  591.669.5904.  https://prescribetoprevent.org/patient-education/videos/  http://www.harmreduction.org  [facility] 122-100-3743  Support Group:  AA/NA and Sponsor/support.  National [place] on Mental Illness (www.[place].antonia.org): 101.865.2106 or 282-872-6004.  Alcoholics Anonymous (www.alcoholics-anonymous.org): Check your phone book for your local chapter.  Suicide Awareness Voices of Education (SAVE) (www.save.org): 587-027-BOEI (4232)  National Suicide Prevention Line (www.mentalhealthmn.org): 126-028-GBDP (1139)  Mental Health Consumer/Survivor Network of [place] (www.mhcsn.net): 712.676.7849 or 874-406-0224  Mental Health Association of [place] (www.mentalhealth.org): 897.925.3647 or 762-588-4137   Substance Abuse and Mental Health Services (www.samhsa.gov)  [place] Opioid Prevention Coalition: www.opioidcoalition.org    [place] Recovery Connection (MRC)  [facility] connects people seeking recovery to resources that help foster and sustain long-term recovery.  Whether you are seeking resources for treatment, transportation, housing, job training, education, health care or other pathways to recovery, [facility] is a great place to start.  763.904.1524.  www.[place]Worldscape.org    Great Pod casts for nutrition and wellness  Listen on Apple Podcasts  Dishing Up Nutrition   [employer]   Nutrition       Understand the connection between what you eat and how you feel. Hosted by licensed nutritionists and dietitians from [employer] we share practical, real-life solutions for healthier living through nutrition.     General Medication Instructions:   See your medication sheet(s) for instructions.   Take all medications as prescribed.  Make no changes unless your primary care provider suggests  them.   Go to all your primary care provider visits.  Be sure to have all your required lab tests. This way, your medicines can be refilled on time.  Do not use any forms of alcohol.    Please Note:  If you have any questions at anytime after you are discharged please call [facility] detox unit 3AW at [phone].  [facility], Behavioral Intake [phone]  Medical Records call [phone]  Outpatient Behavioral Intake call [phone]  LP+ Wait List/Bed Availability call [phone]    Please remember to take all of your behavioral discharge planning and lab paperwork to any follow up appointments, it contains your lab results, diagnosis, medication list and discharge recommendations.      THANK YOU FOR CHOOSING [facility]

## 2023-03-17 NOTE — PROGRESS NOTES
" todayAdmission   S = Situation:   Monty Cox is a 57 year old year old male with a chief complaint of Drug / Alcohol Assessment admitted from [place] ED  B  = Background:   Pt presented to the emergency department seeking detox from alcohol. He has been drinking alcohol daily for the past month. He drinks a pint and a half of vodka daily. Last drink shortly before coming to the ER. He becomes tremulous if he does not drink.  He does have a history of withdrawal seizure years ago.  He denies a history of DTs.    He does have a history of diabetes and depression but has not taken his medications as he ran out of refills.  He denies any recent illness or medical concrns.  Patient denies any other substance use. Denies smoking.   Pt has Hx of anemia  Hemoglobin 8.9 today  Mag was 1.3 and received banana bag at the Ed for correction.  Covid -neg  BAILEY 0.306 o arrival to the Ed  Utox -negative   A  =  Assessment:   Pt is here voluntarily, he desires detox only. Affect flat, pt was cooperative with the admission interview.  He denied SI, HI, A/VH's. Patient does endorse depression and anxiety 8/10. Denies suicidal ideation  MSSA scores were 6. Pt denies pain/discomfort.  Reports eating at the Ed  Pt lives with wife and she does support him.  Vitals: .BP (!) 144/91 (BP Location: Left arm)   Pulse 93   Temp 97.9  F (36.6  C) (Oral)   Resp 16   Ht 1.803 m (5' 11\")   Wt 73.5 kg (162 lb)   SpO2 100%   BMI 22.59 kg/m    R =   Request or Recommendation:    Monitor and treat alcohol withdrawal with Valium per MSSA protocol. withdrawal and seizure precautions as well as 15 min checks for safety. The provider to evaluate and Internal Medicine to see.    "

## 2023-03-17 NOTE — PLAN OF CARE
"Patient is a 57 y.o. male admitted on 3-16-23 for alcohol detox.     MSSA scores throughout shift are 5 and 6, taken at 0845 and 1211. 0 mg of Valium given during shift. Last Valium dose was 10 mg on 3-16-23 at 2020. Patient has received 20 mg of Valium since admission.    During shift, patient presented with a pleasant affect. Patient reported feeling anxious during the shift, Atarax given and was effective per patient. Patient mainly watched tv or read the newspaper during shift. Spent a majority of the shift in the common area. During shift, patient education was given about being prediabetic, including what symptoms to watch out for and ways to raise blood sugar if it drops at home.    Orders for sodium bicarbonate and magnesium oxide were added during shift. Patient was taking sodium bicarbonate at home for metabolic acidosis caused by kidney dysfunction. Magnesium given due to low magnesium levels.    Most recent vitals, taken at 1100: /85   Pulse 89   Temp 97.4  F (36.3  C) (Temporal)   Resp 16   Ht 1.803 m (5' 11\")   Wt 73.5 kg (162 lb)   SpO2 97%   BMI 22.59 kg/m       Patient is currently still in detox. Will be out of detox on 3-17-23 at 2020 if no additional valium given.     Patient currently wishes to discharge home after detox. Has not had discussion with  about discharge plans yet.     Patient's next scheduled medication is magnesium oxide at 1600 today.           "

## 2023-03-17 NOTE — PLAN OF CARE
Behavioral Team Discussion: (3/17/2023)    Continued Stay Criteria/Rationale: Patient admitted for Alcohol  Use Disorder.  Plan: The following services will be provided to the patient; psychiatric assessment, medication management, therapeutic milieu, individual and group support, and skills groups.   Participants: 3A Provider: Dr. James Martinez MD; 3A RN: Sandy Eugene RN; 3A CM's: Carlos Galvin.  Summary/Recommendation: Providers will assess today for treatment recommendations, discharge planning, and aftercare plans. CM will meet with pt for discharge planning.   Medical/Physical: Patient reports a history of withdrawal seizures at this time. Patient reports a history of diabetes.   Precautions:   Behavioral Orders   Procedures     Code 1 - Restrict to Unit     Fall precautions     Routine Programming     As clinically indicated     Seizure precautions     Seizure precautions     Status 15     Every 15 minutes.     Withdrawal precautions     Rationale for change in precautions or plan: N/A  Progress: Initial.    ASAM Dimension Scale Ratings:  Dimension 1: 3 Client tolerates and adriana with withdrawal discomfort poorly. Client has severe intoxication, such that the client endangers self or others, or intoxication has not abated with less intensive levels of services. Client displays severe signs and symptoms; or risk of severe, but manageable withdrawal; or withdrawal worsening despite detox at less intensive level.  Dimension 2: 1 Client tolerates and adriana with physical discomfort and is able to get the services that the client needs.  Dimension 3: 2 Client has difficulty with impulse control and lacks coping skills. Client has thoughts of suicide or harm to others without means; however, the thoughts may interfere with participation in some treatment activities. Client has difficulty functioning in significant life areas. Client has moderate symptoms of emotional, behavioral, or cognitive problems. Client is  able to participate in most treatment activities.  Dimension 4: 3 Client displays inconsistent compliance, minimal awareness of either the client's addiction or mental disorder, and is minimally cooperative.  Dimension 5: 3 Client has poor recognition and understanding of relapse and recidivism issues and displays moderately high vulnerability for further substance use or mental health problems. Client has few coping skills and rarely applies coping skills.  Dimension 6: 3 Client is not engaged in structured, meaningful activity and the client's peers, family, significant other, and living environment are unsupportive, or there is significant criminal justice system involvement.

## 2023-03-17 NOTE — PLAN OF CARE
Goal Outcome Evaluation:   Problem: Alcohol Withdrawal  Goal: Alcohol Withdrawal Symptom Control  Outcome: Progressing     Vitals:2000..BP (!) 147/81 (BP Location: Right arm)   Pulse 105   Temp 97.5  F (36.4  C) (Temporal)   SpO2 100%    Pt tachycardic ,pulse 105.  MSSA 8- Pt administered with 10 mg valium.

## 2023-03-17 NOTE — PROGRESS NOTES
"  03/17/23 1700   Group Therapy Session   Time Session Began 1645   Time Session Ended 1730   Total Time (minutes) 45   Total # Attendees 5-7   Group Type expressive therapy   Group Topic Covered coping skills/lifestyle management;problem-solving;relapse prevention   Group Session Detail \"Hold On\"   Patient Response/Contribution cooperative with task   Patient Participation Detail In response to 3 live original songs shared by MT on themes of belonging, acceptance, and perseverance, patients reflected through writing and optional sharing on their next steps.      Monty presented as soft-spoken, but earnest in his participation.  When it was his turn to share, he spoke about planning to go to a Zoom meeting upon discharge, for further help.  He was not sure of which meeting he would go to yet, but would look one up.      Calm and cooperative affect.        "

## 2023-03-17 NOTE — PROGRESS NOTES
MSSA scores of 8/2, pt received 2mg of ativan for alcohol withdrawal and is observed to sleep throughout the noc.

## 2023-03-17 NOTE — PROGRESS NOTES
03/16/23 1910   Patient Belongings   Patient Belongings other (see comments)   Patient Belongings Remaining with Patient other (see comments)   Patient Belongings Put in Hospital Secure Location (Security or Locker, etc.) other (see comments)   Belongings Search Yes   Clothing Search Yes   Second Staff Elias Baird     STORAGE BIN:   Shoes, coat, backpack,  cords, power pack, pants, hat, slippers, belt    MED ROOM BIN:   Cell phone, smart watch    SECURITY:   2 visa, MC, ID  A             Admission:  I am responsible for any personal items that are not sent to the safe or pharmacy.  [facility] is not responsible for loss, theft or damage of any property in my possession.    Signature:  _________________________________ Date: _______  Time: _____                                              Staff Signature:  ____________________________ Date: ________  Time: _____      2nd Staff person, if patient is unable/unwilling to sign:    Signature: ________________________________ Date: ________  Time: _____   Discharge:  [facility] has returned all of my personal belongings:    Signature: _________________________________ Date: ________  Time: _____                                          Staff Signature:  ____________________________ Date: ________  Time: _____

## 2023-03-17 NOTE — PROGRESS NOTES
[facility], [address]     Insurance: [employer]    Writer met with patient. Patient states he will like detox only. He states he has support services at home through AA. Writer encouraged patient to reach out to case management if he wants additional resources.     Current Plan: Detox Only.     Dorothea Sosa  [facility] - Mental Health and Addiction Service Line  [address] - Adult Inpatient Addiction Psychiatry Unit

## 2023-03-17 NOTE — H&P
Monty Cox is a 57 year old male   History was provided by PATEINT who was a fair  historian.   CHIEF COMPLAINT:  alcohol    HISTORY OF PRESENT ILLNESS:      Per ED Provider Note dated 3/16/23:    HPI  Monty Cox is a 57 year old male who presents to the emergency department seeking detox from alcohol.  Patient states he has been drinking alcohol daily for the past month.  He states he drinks a pint and a half of vodka.  He becomes tremulous if he does not drink.  He does have a history of withdrawal seizure years ago.  He denies a history of DTs.  Patient does endorse depression but denies suicidal ideation.  He does have a history of diabetes and depression but has not taken his medications as he ran out of refills.  He denies any recent illness or medical concerns.  Patient denies any other substance use.             Per my interview with patient:  He was sober 3 months prior to this particular relapse  Patient states he has been drinking alcohol daily for the past month.  He states he drinks a pint and a half of vodka.      Patient has been using the following substances: alcohol  Started at age18 , became a problem at 30s   Last use yesterday   Breathalyzer0.36  Withdrawal symptoms include shakey anxious     Patient has tolerance, withdrawal, progressive use, loss of control, spending more time and more amount than intended. Patient has made attempts to quit, is experiencing cravings, and reports negative consequences.              alchol       USE DISORDER - CRITERIA  +admits to taking larger amounts than initially intended  +admits to unsuccessful efforts to cut down or control use  +admits to spending a great deal of time in activities necessary to obtain, use and recover from  +admits to cravings or a strong desire to use   + admits to failure to fulfill obligations at work, school or home  + admits to continued use despite negative consequences  + admits to giving up important activities to  use  +admits to use in situations in which it is physically dangerous        Patient does have a history of seizures.  Patient does not have a history of delirium tremens.             Denies thoughts of suicide or harming others.      Denies auditory or visual hallucinations.     Patient does not smokes/vapes nicotine    Patient denied any gambling    Substance Age first use First became regular or problematic Most recent use   Alcohol         Cannabis none     Cocaine NONE       Stimulants NONE       Opioids NONE  Previous methadone therapy:  No  Previous buprenorphine therapy:  No  Previous naltrexone therapy: No     Sedatives NONE       Hallucinogens NONE       Inhalants NONE       Other         OTC drugs NONE       Nicotine               PSYCHIATRIC REVIEW OF SYSTEMS:         Psychiatric Review of Systems:   Depression: down ,fatigue   Reports depressed mood,  No suicidal ideation,  No decreased motivation decreased energy decreased interest, changes in sleep, changes in appetite, guilt, hopelessness, helplessness,  Sunitha:       Denies: sleeplessness, increased goal-directed activities, abrupt increase in energy, pressured speech   impulsiveness, racing thoughts, increased goal-directed activities, pressured speech, increase in energy  Sunitha Feeling euphoric,Distractible,Impulsive,Grandiose,Talking excessively,Have energy without sleeping,Mood swings,Irritability  Psychosis:     Denies: visual hallucinations, auditory hallucinations, paranoia  Anxiety: Patient reports situational anxiety-tense   denied excessive worries that are difficult to control for the past 6 months,   Chronic anxiety , not able to stop worrying impacting sleep, poor conc, irritable , muscle tension fatigue    denies any Panic attacks  Pt has following s/o of anxiety  Palpitation pounding heart trembling shaking shortness of breath   feeling of choking chest pain nausea feeling dizzy chills numbness derealization fear of dying fear of  losing control    Come out of the blue  Afraid of another panic attack  Denies: worries that are difficult to control for the past 6 months, panic attacks    Social anxiety disorder  Denies /: Marked anxiety about 1 or more social situations in which patient is exposed to scrutiny ofothers and patient fears patient will act in the way that patient that will be negatively  causes significant impairment  Patient is afraid of being judged scrutinized  Patient avoids social situations  PTSD:   Denies: re-experiencing past trauma, nightmares, increased arousal, avoidance of traumatic stimuli, impaired function.  OCD:   denies obsessions, patient has compulsions checking, counting symmetry, cleaning, skin picking.    ED:     Denies: restriction, binging, purging.         patient denies :symptoms of attention deficit disorder include a failure to pay attention to detail, a pattern of careless mistakes, a pattern of inattentive listening, a failure to follow through with projects, poor personal organization, losing necessary objects, distractibility, forgetfulness.                  PSYCHIATRIC HISTORY     Previous diagnoses:       Depression     Past court commitments: none  SIB /SUICIDE ATTEMPTS NONE  Psych Hosp :none  Outpatient Programs 0  Inpatient cd trt 2  Out pt cd trt 2  Detoxes 1      SOCIAL HISTORY                                                                             Patient is   Patient has  0 children  Patient is employed as a   Tile maker  Patient's housing is own house  Patient has the following stressors money ,job      Family History:   FAMILY HISTORY:   Family History   Problem Relation Age of Onset     Substance Abuse Father      Substance Abuse Nephew      Family Mental Health History-  none    Substance Use Problems - present for father had alcoholism             PTA Medications:     Medications Prior to Admission   Medication Sig Dispense Refill Last Dose     escitalopram (LEXAPRO) 5 MG  tablet Take 1 tablet by mouth daily   Past Month at AM     gabapentin (NEURONTIN) 100 MG capsule Take 100 mg by mouth 3 times daily   Past Month at 1 am;1 pm;1 pm     sodium bicarbonate 650 MG tablet Take 1 tablet by mouth 2 times daily   Past Week at AM     traZODone (DESYREL) 50 MG tablet Take 50 mg by mouth daily   3/15/2023 at prn     ACCU-CHEK GUIDE test strip         blood glucose (ACCU-CHEK GUIDE) test strip Test 2 times per day. Pharmacy allowed to substitute per patient's insurance.        blood glucose monitoring (ACCU-CHEK ERIKA PLUS) meter device kit Use as directed        blood glucose monitoring (SOFTCLIX) lancets Test 2 times per day. Call your doctor if blood glucose is > 300.        blood glucose monitoring (SOFTCLIX) lancets         Blood Glucose Monitoring Suppl (ACCU-CHEK GUIDE) w/Device KIT              Allergies:   No Known Allergies       Labs:     Recent Results (from the past 48 hour(s))   Alcohol breath test POCT    Collection Time: 03/16/23 11:30 AM   Result Value Ref Range    Alcohol Breath Test 0.306 (A) 0.00 - 0.01   Asymptomatic COVID-19 Virus (Coronavirus) by PCR Nasopharyngeal    Collection Time: 03/16/23 12:19 PM    Specimen: Nasopharyngeal; Swab   Result Value Ref Range    SARS CoV2 PCR Negative Negative   Comprehensive metabolic panel    Collection Time: 03/16/23 12:19 PM   Result Value Ref Range    Sodium 141 136 - 145 mmol/L    Potassium 3.6 3.4 - 5.3 mmol/L    Chloride 100 98 - 107 mmol/L    Carbon Dioxide (CO2) 20 (L) 22 - 29 mmol/L    Anion Gap 21 (H) 7 - 15 mmol/L    Urea Nitrogen 21.8 (H) 6.0 - 20.0 mg/dL    Creatinine 1.30 (H) 0.67 - 1.17 mg/dL    Calcium 7.8 (L) 8.6 - 10.0 mg/dL    Glucose 112 (H) 70 - 99 mg/dL    Alkaline Phosphatase 130 (H) 40 - 129 U/L     (H) 10 - 50 U/L    ALT 18 10 - 50 U/L    Protein Total 7.7 6.4 - 8.3 g/dL    Albumin 3.7 3.5 - 5.2 g/dL    Bilirubin Total 0.3 <=1.2 mg/dL    GFR Estimate 64 >60 mL/min/1.73m2   Magnesium    Collection Time:  "03/16/23 12:19 PM   Result Value Ref Range    Magnesium 1.3 (L) 1.7 - 2.3 mg/dL   CBC with platelets and differential    Collection Time: 03/16/23 12:19 PM   Result Value Ref Range    WBC Count 4.0 4.0 - 11.0 10e3/uL    RBC Count 2.64 (L) 4.40 - 5.90 10e6/uL    Hemoglobin 8.9 (L) 13.3 - 17.7 g/dL    Hematocrit 26.9 (L) 40.0 - 53.0 %     (H) 78 - 100 fL    MCH 33.7 (H) 26.5 - 33.0 pg    MCHC 33.1 31.5 - 36.5 g/dL    RDW 15.1 (H) 10.0 - 15.0 %    Platelet Count 209 150 - 450 10e3/uL    % Neutrophils 58 %    % Lymphocytes 29 %    % Monocytes 10 %    % Eosinophils 1 %    % Basophils 1 %    % Immature Granulocytes 1 %    NRBCs per 100 WBC 0 <1 /100    Absolute Neutrophils 2.4 1.6 - 8.3 10e3/uL    Absolute Lymphocytes 1.2 0.8 - 5.3 10e3/uL    Absolute Monocytes 0.4 0.0 - 1.3 10e3/uL    Absolute Eosinophils 0.0 0.0 - 0.7 10e3/uL    Absolute Basophils 0.0 0.0 - 0.2 10e3/uL    Absolute Immature Granulocytes 0.0 <=0.4 10e3/uL    Absolute NRBCs 0.0 10e3/uL   Drug abuse screen 1 urine (ED)    Collection Time: 03/16/23  4:51 PM   Result Value Ref Range    Amphetamines Urine Screen Negative Screen Negative    Barbituates Urine Screen Negative Screen Negative    Benzodiazepine Urine Screen Negative Screen Negative    Cannabinoids Urine Screen Negative Screen Negative    Cocaine Urine Screen Negative Screen Negative    Opiates Urine Screen Negative Screen Negative   Glucose by meter    Collection Time: 03/17/23  8:03 AM   Result Value Ref Range    GLUCOSE BY METER POCT 90 70 - 99 mg/dL         BP (!) 143/93 (BP Location: Right arm)   Pulse 92   Temp 97.8  F (36.6  C) (Temporal)   Resp 16   Ht 1.803 m (5' 11\")   Wt 73.5 kg (162 lb)   SpO2 97%   BMI 22.59 kg/m    Weight is 162 lbs 0 oz  Body mass index is 22.59 kg/m .    Physical Exam:     ROS: 10 point ROS neg other than the symptoms noted above in the HPI.            Past Medical History:   PAST MEDICAL HISTORY: History reviewed. No pertinent past medical " history.    PAST SURGICAL HISTORY: History reviewed. No pertinent surgical history.    -    -           MENTAL STATUS EXAM:      Constitutional: General appearance of patient:  Appearance:  awake, alert, appeared as age stated, adequate groomed and slightly unkempt  Attitude:  cooperative  Eye Contact:  good  Mood:  euthymic  Affect:  congruent   Speech:  clear, coherent normal rate   Psychomotor Behavior:  no evidence of tardive dyskinesia, dystonia, or tics  Thought Process:  logical, linear and goal oriented  Associations:  no loose associations  Thought Content:  no evidence of psychotic thought and active suicidal ideation present  Denied any active suicidal /homicidation ideation plan intent   Insight:  fair  Judgment:  fair  Oriented to:  time, person, and place  Attention Span and Concentration:  intact  Recent and Remote Memory:  intact  Language:  english with appropriate syntax and vocabulary  Fund of Knowledge: appropriate  Muscle Strength and Tone: normal  Gait and Station: Normal     There are no abnormal or psychotic thoughts, no preoccupations, no overvalued ideas, no rumination, no obsessions, no compulsions, no somatic concerns, no hypochrondriasis, no ideas of reference, and no delusions.  Patient denies homicidal thoughts.   Patient denies suicidal thoughts.  Patient appears to have good judgment and good insight.     Musculoskeletal: Patient shows no abnormalities of motor activity: there is no tremor, no tic, and no dystonia.  There is no apparent muscle atrophy, strength and tone appear normal, and there are no abnormal movements.  Patient has normal gait and stance.    DISCUSSION:         Assessment:       Patient has a biological predisposition with family history positive for alcohol  Psychologically patient is experiencing alcohol  Patient has these particular stressors job, money   Patient has chronic illness exacerbation leading to hospitalization progression as described.     Patient  has been unable to stop using drugs in the community due to both physical and psychological symptoms.  Continued use will put the patient at risk for medical and/or psychiatric complications.      Inpatient psychiatric hospitalization is warranted at this time for safety, stabilization, and possible adjustment in medications.       Diagnoses:    alochol use dx severe'  Alcohol withdrawal  CKD    htn       Plan:   Problem list  1#alcohol use disorder severe alcohol withdrawal severe     - MSSA protocol using Valium for management of alcohol withdrawal    - Continue thiamine, folate, and multivitamin daily    MSSA    Eating Disturbances: ate and enjoyed all of it or not applicable  Tremor: 1 - not visibly apparent but can be felt by the examiner placing his fingertip slightly against the patient's fingertips  Sleep Disturbance: slept through the night or not applicable  Clouding of Sensorium: no evidence  Hallucinations: 0 - none  Quality of Contact: 0 - awareness of examiner and people around him/her  Agitation: 0 - normal activity  Paroxysmal Sweats: 0 - no observed sweating  Temperature: 99.5 or below  Pulse: 3 - 90 to 99  Total MSSA Score: 5    2#patient has chronic kidney disease he was previously on dialysis  I spoke with medicine to help me and put an internal Medicine consult  Patient has elevated blood urine nitrogen 21.8 creatinine is elevated 1.3 calcium is low 7.8 magnesium is low 1.3    3#patient has elevated  we will put internal medicine consult  4#patient has hemoglobin low 8.9 hematocrit low 36.9 RBC low at 2.64 we will put internal medicine consult  5#patient be on Lexapro 5 mg for his depression      - Consider anti-craving medications prior to discharge. Pt willing to review additional information about both naltrexone and Antabuse.  -Alcohol withdrawal nausea prn Zofran as needed for nausea     hydroxyzine 25 mg q4h prn for acute anxiety  Trazodone 50 mg at bedtime prn for sleep  disturbances       Patient has been unable to stop using drugs in the community due to both physical and psychological symptoms.  Continued use will put the patient at risk for medical and/or psychiatric complications.    I HAVE REVIEWED LABS WITH PT AND TALKED ABOUT RESULTS WITH PT  I HAVE REVIEWED AND SUMMARIZED OLD RECORDS including his medication reconcilation of his home medications  and PDMP was reviewed  I HAVE SPOKEN WITH RN ABOUT MEDICATIONS AND DETOX SCORES  I HAVE SPOKEN WITH CM ABOUT PTS TREATMENT OPTIONS                 Laboratory/Imaging:    Liver Function Studies -   Recent Labs   Lab Test 03/16/23  1219   PROTTOTAL 7.7   ALBUMIN 3.7   BILITOTAL 0.3   ALKPHOS 130*   *   ALT 18      Last Comprehensive Metabolic Panel:  Sodium   Date Value Ref Range Status   03/16/2023 141 136 - 145 mmol/L Final     Potassium   Date Value Ref Range Status   03/16/2023 3.6 3.4 - 5.3 mmol/L Final     Chloride   Date Value Ref Range Status   03/16/2023 100 98 - 107 mmol/L Final     Carbon Dioxide (CO2)   Date Value Ref Range Status   03/16/2023 20 (L) 22 - 29 mmol/L Final     Anion Gap   Date Value Ref Range Status   03/16/2023 21 (H) 7 - 15 mmol/L Final     GLUCOSE BY METER POCT   Date Value Ref Range Status   03/17/2023 90 70 - 99 mg/dL Final     Urea Nitrogen   Date Value Ref Range Status   03/16/2023 21.8 (H) 6.0 - 20.0 mg/dL Final     Creatinine   Date Value Ref Range Status   03/16/2023 1.30 (H) 0.67 - 1.17 mg/dL Final     GFR Estimate   Date Value Ref Range Status   03/16/2023 64 >60 mL/min/1.73m2 Final     Comment:     eGFR calculated using 2021 CKD-EPI equation.     Calcium   Date Value Ref Range Status   03/16/2023 7.8 (L) 8.6 - 10.0 mg/dL Final     Bilirubin Total   Date Value Ref Range Status   03/16/2023 0.3 <=1.2 mg/dL Final     Alkaline Phosphatase   Date Value Ref Range Status   03/16/2023 130 (H) 40 - 129 U/L Final     ALT   Date Value Ref Range Status   03/16/2023 18 10 - 50 U/L Final     AST  "  Date Value Ref Range Status   03/16/2023 184 (H) 10 - 50 U/L Final                   Medical treatment/interventions:  Medical concerns: As above    - Consults: IM consult placed. Appreciate assistance.     Legal Status: Voluntary     Safety Assessment:   Checks: Status 15  Pt has not required locked seclusion or restraints in the past 24 hours to maintain safety, please refer to RN documentation for further details.    The risks, benefits, alternatives and side effects have been discussed and are understood by the patient.       Patient will be treated in therapeutic milieu with appropriate individual and group therapies as described.      Clinical Global Impressions  First:7     Most recent:7     Disposition: Pending clinical stabilization. Pt does  appear interested in COMPLETE DETOX AND DO TRT  Length of stay 3-5 days    Attestation:  Patient has been seen and evaluated by me, Dr James Martinez MD  The patient was counseled on nature of illness and treatment plan/options  Care was coordinated with treatment team          \"Much or all of the text in this note was generated through the use of Dragon Dictate voice to text software. Errors in spelling or words which appear to be out of contact are unintentional, may be present due having escaped editing\"     "

## 2023-03-18 VITALS
TEMPERATURE: 97.6 F | BODY MASS INDEX: 22.68 KG/M2 | HEIGHT: 71 IN | HEART RATE: 85 BPM | RESPIRATION RATE: 16 BRPM | WEIGHT: 162 LBS | SYSTOLIC BLOOD PRESSURE: 127 MMHG | OXYGEN SATURATION: 100 % | DIASTOLIC BLOOD PRESSURE: 85 MMHG

## 2023-03-18 LAB
ANION GAP SERPL CALCULATED.3IONS-SCNC: 15 MMOL/L (ref 7–15)
BUN SERPL-MCNC: 25.6 MG/DL (ref 6–20)
CALCIUM SERPL-MCNC: 8.2 MG/DL (ref 8.6–10)
CHLORIDE SERPL-SCNC: 95 MMOL/L (ref 98–107)
CREAT SERPL-MCNC: 1.39 MG/DL (ref 0.67–1.17)
DEPRECATED HCO3 PLAS-SCNC: 21 MMOL/L (ref 22–29)
GFR SERPL CREATININE-BSD FRML MDRD: 59 ML/MIN/1.73M2
GLUCOSE BLDC GLUCOMTR-MCNC: 95 MG/DL (ref 70–99)
GLUCOSE SERPL-MCNC: 98 MG/DL (ref 70–99)
HOLD SPECIMEN: NORMAL
MAGNESIUM SERPL-MCNC: 1.3 MG/DL (ref 1.7–2.3)
OSMOLALITY SERPL: 290 MMOL/KG (ref 275–295)
OSMOLALITY UR: 411 MMOL/KG (ref 100–1200)
POTASSIUM SERPL-SCNC: 4.1 MMOL/L (ref 3.4–5.3)
SODIUM SERPL-SCNC: 131 MMOL/L (ref 136–145)
SODIUM UR-SCNC: 22 MMOL/L

## 2023-03-18 PROCEDURE — 83935 ASSAY OF URINE OSMOLALITY: CPT

## 2023-03-18 PROCEDURE — 250N000013 HC RX MED GY IP 250 OP 250 PS 637: Performed by: PSYCHIATRY & NEUROLOGY

## 2023-03-18 PROCEDURE — 83735 ASSAY OF MAGNESIUM: CPT

## 2023-03-18 PROCEDURE — 250N000013 HC RX MED GY IP 250 OP 250 PS 637: Performed by: NURSE PRACTITIONER

## 2023-03-18 PROCEDURE — 36415 COLL VENOUS BLD VENIPUNCTURE: CPT

## 2023-03-18 PROCEDURE — 99232 SBSQ HOSP IP/OBS MODERATE 35: CPT

## 2023-03-18 PROCEDURE — 84300 ASSAY OF URINE SODIUM: CPT

## 2023-03-18 PROCEDURE — 80048 BASIC METABOLIC PNL TOTAL CA: CPT

## 2023-03-18 PROCEDURE — 99238 HOSP IP/OBS DSCHRG MGMT 30/<: CPT | Performed by: PSYCHIATRY & NEUROLOGY

## 2023-03-18 PROCEDURE — 250N000013 HC RX MED GY IP 250 OP 250 PS 637

## 2023-03-18 PROCEDURE — 83930 ASSAY OF BLOOD OSMOLALITY: CPT

## 2023-03-18 PROCEDURE — 250N000012 HC RX MED GY IP 250 OP 636 PS 637

## 2023-03-18 RX ORDER — ALLOPURINOL 100 MG/1
50 TABLET ORAL DAILY
Status: DISCONTINUED | OUTPATIENT
Start: 2023-03-18 | End: 2023-03-18 | Stop reason: HOSPADM

## 2023-03-18 RX ORDER — NALTREXONE HYDROCHLORIDE 50 MG/1
50 TABLET, FILM COATED ORAL DAILY
Qty: 30 TABLET | Refills: 0 | Status: SHIPPED | OUTPATIENT
Start: 2023-03-18 | End: 2024-06-25

## 2023-03-18 RX ORDER — GABAPENTIN 100 MG/1
100 CAPSULE ORAL 3 TIMES DAILY
Qty: 90 CAPSULE | Refills: 0 | Status: ON HOLD | OUTPATIENT
Start: 2023-03-18 | End: 2023-08-10

## 2023-03-18 RX ORDER — PREDNISONE 20 MG/1
40 TABLET ORAL DAILY
Status: DISCONTINUED | OUTPATIENT
Start: 2023-03-18 | End: 2023-03-18 | Stop reason: HOSPADM

## 2023-03-18 RX ORDER — ALLOPURINOL 100 MG/1
100 TABLET ORAL DAILY
Status: CANCELLED | OUTPATIENT
Start: 2023-03-18

## 2023-03-18 RX ORDER — MAGNESIUM OXIDE 400 MG/1
400 TABLET ORAL 2 TIMES DAILY
Status: DISCONTINUED | OUTPATIENT
Start: 2023-03-19 | End: 2023-03-18 | Stop reason: HOSPADM

## 2023-03-18 RX ORDER — PREDNISONE 20 MG/1
20 TABLET ORAL DAILY
Status: DISCONTINUED | OUTPATIENT
Start: 2023-03-23 | End: 2023-03-18 | Stop reason: HOSPADM

## 2023-03-18 RX ORDER — ESCITALOPRAM OXALATE 5 MG/1
10 TABLET ORAL DAILY
Qty: 30 TABLET | Refills: 0 | Status: SHIPPED | OUTPATIENT
Start: 2023-03-18 | End: 2023-08-08

## 2023-03-18 RX ORDER — PREDNISONE 10 MG/1
10 TABLET ORAL DAILY
Status: DISCONTINUED | OUTPATIENT
Start: 2023-03-26 | End: 2023-03-18 | Stop reason: HOSPADM

## 2023-03-18 RX ORDER — PREDNISONE 5 MG/1
5 TABLET ORAL DAILY
Status: DISCONTINUED | OUTPATIENT
Start: 2023-03-28 | End: 2023-03-18 | Stop reason: HOSPADM

## 2023-03-18 RX ADMIN — Medication 50 MG: at 12:38

## 2023-03-18 RX ADMIN — MAGNESIUM OXIDE TAB 400 MG (241.3 MG ELEMENTAL MG) 400 MG: 400 (241.3 MG) TAB at 07:59

## 2023-03-18 RX ADMIN — MULTIPLE VITAMINS W/ MINERALS TAB 1 TABLET: TAB at 07:58

## 2023-03-18 RX ADMIN — ESCITALOPRAM OXALATE 5 MG: 5 TABLET, FILM COATED ORAL at 07:59

## 2023-03-18 RX ADMIN — SODIUM BICARBONATE 650 MG TABLET 650 MG: at 07:59

## 2023-03-18 RX ADMIN — PREDNISONE 40 MG: 20 TABLET ORAL at 12:38

## 2023-03-18 RX ADMIN — FOLIC ACID 1 MG: 1 TABLET ORAL at 07:59

## 2023-03-18 RX ADMIN — TRAZODONE HYDROCHLORIDE 50 MG: 50 TABLET ORAL at 00:07

## 2023-03-18 RX ADMIN — IBUPROFEN 400 MG: 400 TABLET ORAL at 05:21

## 2023-03-18 RX ADMIN — THIAMINE HCL TAB 100 MG 100 MG: 100 TAB at 07:58

## 2023-03-18 ASSESSMENT — ACTIVITIES OF DAILY LIVING (ADL)
ADLS_ACUITY_SCORE: 30
LAUNDRY: WITH SUPERVISION
ADLS_ACUITY_SCORE: 30
ORAL_HYGIENE: INDEPENDENT
ADLS_ACUITY_SCORE: 30
HYGIENE/GROOMING: INDEPENDENT
ADLS_ACUITY_SCORE: 30
DRESS: INDEPENDENT
ADLS_ACUITY_SCORE: 30
ADLS_ACUITY_SCORE: 30

## 2023-03-18 NOTE — PLAN OF CARE
Problem: Alcohol Withdrawal  Goal: Alcohol Withdrawal Symptom Control  Outcome: Progressing   Goal Outcome Evaluation:    Patient continues in detox status MSSA scores 5 and 5. Out and visible on the unit. Flat affect. Pleasant and cooperative with cares.  Minimal engagements with peers. Reports eating 100% dinner and fluids taken well. Pt denied SI,SIB, HI, and hallucinations;  Attended the evening AA meeting. Endorsed anxiety 6 /10. PRN hydroxyzine 25 mg administered with effect per pt. Pt also c/o wrist pain due to hx of gout.   mg Ibuprofen given. No behavioral concerns noted this shift. VSS    PRN: Trazodone 50 mg x 1 and hydroxyzine 25 mg, Ibuprofen 400 mg  this shift.    2000 Vitals:. BP (!) 148/89 (BP Location: Left arm)   Pulse 94   Temp 98.1  F (36.7  C) (Temporal)   Resp 16  SpO2 97%

## 2023-03-18 NOTE — DISCHARGE SUMMARY
Monty Cox is a 57 year old male    CHIEF COMPLAINT:  alcohol    HISTORY OF PRESENT ILLNESS:          Hospital Course:     Patient was admitted and used Valium for detox. He detoxed without complications. He had no problems with depression.    Today patient is stable. He denies suicidal or homicidal thoughts and no hallucinations. He had wrist pain yesterday due to gout but it is more mild today.         Per ED Provider Note dated 3/16/23:  HPI  Monty Cox is a 57 year old male who presents to the emergency department seeking detox from alcohol.  Patient states he has been drinking alcohol daily for the past month.  He states he drinks a pint and a half of vodka.  He becomes tremulous if he does not drink.  He does have a history of withdrawal seizure years ago.  He denies a history of DTs.  Patient does endorse depression but denies suicidal ideation.  He does have a history of diabetes and depression but has not taken his medications as he ran out of refills.  He denies any recent illness or medical concerns.  Patient denies any other substance use.                  MENTAL STATUS EXAM:      Constitutional: General appearance of patient:  Appearance:  awake, alert, appeared as age stated, adequate groomed and slightly unkempt  Attitude:  cooperative  Eye Contact:  good  Mood:  euthymic  Affect:  congruent   Speech:  clear, coherent normal rate   Psychomotor Behavior:  no evidence of tardive dyskinesia, dystonia, or tics  Thought Process:  logical, linear and goal oriented  Associations:  no loose associations  Thought Content:  no evidence of psychotic thought and active suicidal ideation present  Denied any active suicidal /homicidation ideation plan intent   Insight:  fair  Judgment:  fair  Oriented to:  time, person, and place  Attention Span and Concentration:  intact  Recent and Remote Memory:  intact  Language:  english with appropriate syntax and vocabulary  Fund of Knowledge:  appropriate  Muscle Strength and Tone: normal  Gait and Station: Normal  Minimal change in mental status in the past 24 hours               Assessment:       Patient has completed detox and is safe for discharge       Diagnoses:   Alcohol use dx severe'  Alcohol withdrawal  CKD   HTN       Plan:     Discharge today    Patient plans to pursue outpatient virtual IOP.      escitalopram  5 mg Oral Daily     folic acid  1 mg Oral Daily     multivitamin w/minerals  1 tablet Oral Daily     sodium bicarbonate  650 mg Oral BID     thiamine  100 mg Oral Daily     Will increase Lexapro to 10 mg a day. He will also use Gabapentin    No further change in treatment plan  Patient seen, chart reviewed, case reviewed with  and with nursing.   Case reviewed in multi-disciplinary treatment team.    Valentin Koehler MD

## 2023-03-18 NOTE — PLAN OF CARE
Goal Outcome Evaluation:        Problem: Alcohol Withdrawal  Goal: Alcohol Withdrawal Symptom Control  Outcome: Progressing      Patient is A&O x 4, no C/O  dizziness . Pt was about to discharge today but his Sodium was low 131,Internal medicine Kathryn ordered stat labs to r/o why Na+ are low.

## 2023-03-18 NOTE — PROGRESS NOTES
MSSA score os of 5,pt did not require medication for alcohol withdrawal. Up by 2 for left arm pain secondary to gout and received an ice pack and trazade early in the shift and an ice pack and ibuprofen late in the shift. He is observed to sleep throughout the noc when not engaged in cares.

## 2023-03-18 NOTE — PROGRESS NOTES
[facility]    Medicine Progress Note - Hospitalist Service    Date of Admission:  3/16/2023    Assessment & Plan   Monty Cox is a 57 year old male admitted on 3/16/2023 for detox from alcohol. He a past medical history of alcohol use disorder, gout, CKD stage 3b previously on HD, HTN, pancreatitis, prediabetes, MDD, PILI, and gout.  Sent has been consulted for comanagement and detox.    Medicine will continue to follow if here d/t low Mg. BMP back with Na of 131 <--141.  Pt did receive NaCl in ED. Also got ibuprofen x2 on 3/18 for inflammatory pain. Could be r/t poor PO intake, but will check serum Osmo, Urine studies prior to leaving today.     Serum Osmo 290, urine Na 22, urine Osmo 411. Pt to get labs follow-up in 1-2 weeks with PCP. Suspect poor intake. Ok to go home per Medicine with close follow-up.    If discharging:  - Start Mg Oxide 400 mg BID tomorrow, on 3/19  - Continue Prednisone taper as below for gout flare  - Start allopurinol 50 mg daily for gout  - Follow-up with PCP in 1-2 weeks to get BMP and to assess response to steroid taper for gout flare    Acute Alcohol Withdrawal  Alcohol Use Disorder  Alcohol Withdrawal Seizure hx  Last drink on prior to admission. Drinks a pint and a half of vodka daily for the past month. No history of seizures or DT's. Blood alcohol level of 0.306. GGT of 293.Utox negative. Alk phos WNL.Total bili WNL. AST: ALT of 184:18, in 2:1 pattern with alcohol use. Alk phos 130. MSSA scores of 5-6 in past 24 hrs.   - Management per Psychiatry  - No need for BMP/hepatic panel unless clinically indicated   - Agree with MSSA protocol  - Agree with using Ativan  - Agree with MVI, folate, thiamine     Alcoholic Pancreatitis hx: No current pain.  Has been hospitalized in the past.  Lipase within normal.  - Monitor     CKD stage 3b  Metabolic acidosis hx  HTN  Blood pressure ranging in the 140s/80s to 90s.  Per Pharm review patient  has been on Lasix 80 mg in the past. Recently, chlorthalidone 25 mg filled on 2/23 but patient reports he stopped taking it right away.  Patient believes that his blood pressure was elevated as an appointment as he had been late and had been rushing to it.  He reports he has not taken it since.  Does not have a blood pressure cuff at home.  Hx of  CKD stage 5, but now 3b Patient has been on dialysis in the past but his kidney function resolved per pt and he has not needed dialysis for around 1-1/2 years. He still has a patent left fistula. Cr baseline ~1.38-2.05, GFR 21-61. See trends below. Follows with Nephrology [facility]. Last appointment 6/2022. Cr is within his normal range and appears euvolemic. Mildly hypertensive prior to treatment, but now down trending.   - Restart Sodium Bicarb 650 mg BID  - Monitor BP  - Restart hydrochlorothiazide if trends remain elevated  - Always recheck BP if high or low and correlate with clinical situation  - Treat pain, anxiety, and any withdrawal s/s if present  - Notify provider if SBP >170 or DBP >110 after careful reassessment, treatment of pain/anxiety/withdrawal/home PTA med adminstration  - Follow-up OP in 1-2 weeks with [facility]   - Avoid nephrotoxic medication if available   - Ok to restart gabapentin 100 mg TID if needed for mental health    Prediabetes (A1c of 5.7 on  3/17/23): Not on any PTA medications. Appears to be checking once daily BG at home.  Reports blood sugars trend in the 90s at home.  - A1c pending  - POCT BG daily.   - Hypoglycemia protocol  - Monitor    Gout: Reporting current flare .Hands do look swollen, mild tenderness to touch on fingers and wrists. Has been on allopurinol 50 mg & 100 mg in the past.  Patient states that he was taken off of it due to concerns with his kidney function. Uric acid 11.5.  - Prednisone taper 5 days of 40 mg, gradual taper off over 7-days  - Start 50 mg allopurinol daily if uric acid remains elevated  - Pt to follow-up  "with PCP in 1-2 weeks to check on flare/get BMP    Normocytic Anemia likely 2/2 chronic disease: See trend below. Hgb trends over the past years from 9.4-11.6. Per Pharm rec, was on Ergocalciferol 50,000 units at one time.   -Monitor  - Follow-up OP with Nephrology     Hemoglobin   Date Value Ref Range Status   03/16/2023 8.9 (L) 13.3 - 17.7 g/dL Final       MDD  PILI: Denies SI/SIB/HI. PTA meds include Lexapro and gabapentin, last filled on 9/2022. Pt states he \"should be taking, but is not.\"  - Per Psychiatry     Age-related cataracts of both eyes: Mild cataracts in both eyes, left greater than right.  -Follow-up OP    Hypomagnesia: Likely 2/2 to Etoh withdrawal/poor PO intake/CKD. 1.3 on admission. Replacement given.Recheck at 1.4.  - 400 mg QID for 1 day  - Recheck in AM    Hyponatremia: 134 on labs. Likely d/t poor PO intake/Etoh usage/possibly due to CKD.   -Encourage intake  - Recheck in AM    HAGMA: 21 upon admission. Suspect 2/2 Etoh usage.   - Recheck at 16, trending down  - Recheck in AM      Clinically Significant Risk Factors          # Hypocalcemia: Lowest Ca = 7.8 mg/dL in last 2 days, will monitor and replace as appropriate   # Hypomagnesemia: Lowest Mg = 1.3 mg/dL in last 2 days, will replace as needed  # Anion Gap Metabolic Acidosis: Highest Anion Gap = 21 mmol/L in last 2 days, will monitor and treat as appropriate                    Disposition Plan      Expected Discharge Date: 03/18/2023                The patient's care was discussed with the Bedside Nurse, Patient and Primary team.    [facility] [employer]  Hospitalist Service  [facility]  Securely message with [employer] (more info)  Text page via [facility] Paging/Directory   ______________________________________________________________________    Interval History   Nursing/SW/consult/interdisciplinary healthcare worker's notes reviewed. No acute events overnight. States finger joints and wrists " swollen from gout flares. ROS: 12 point ROS negative other than the symptoms noted here or above in the assessment and plan.       Physical Exam   Vital Signs: Temp: 97.9  F (36.6  C) Temp src: Temporal BP: (!) 138/93 Pulse: 99   Resp: 16 SpO2: 100 % O2 Device: None (Room air)    Weight: 162 lbs 0 oz    General Appearance:  In NAD, sitting in bed  Respiratory: LS clear b/l, normal RR  Cardiovascular: S1, S2, no m/r/g, thrill +/bruit+, no peripheral edema  GI: BS+, all 4 quadrants, no masses, non-tender upon palpation  Skin: Intact on face, arms, legs.  No wounds, bruising, or lesions noted.  Other:  A&Ox4, moving all extremities, flat affect, tender/swollen joints on finger and mildly on wrist.       Medical Decision Making       40 MINUTES SPENT BY ME on the date of service doing chart review, history, exam, documentation & further activities per the note.      Data     I have personally reviewed the following data over the past 24 hrs:    N/A  \   N/A   / N/A     131 (L) 95 (L) 25.6 (H) /  98   4.1 21 (L) 1.39 (H) \       Imaging results reviewed over the past 24 hrs:   No results found for this or any previous visit (from the past 24 hour(s)).

## 2023-03-18 NOTE — PLAN OF CARE
Problem: Alcohol Withdrawal  Goal: Alcohol Withdrawal Symptom Control  3/18/2023 1459 by Sandy Eugene, RN  Outcome: Met  3/18/2023 1301 by Sandy Eugene, RN  Outcome: Progressing   Goal Outcome Evaluation:         Patient alert and oriented x 3. Patient cleared to d/c home.  BS present and active, passing flatus LBM today Patient voiding well without difficulty.  Discharge instructions and medication reviewed with patient and he verbalized understanding and signed AVS.  Pt's belongings all returned to patient.Psych associate Satinder escorted Pt.Left at 1500

## 2023-03-18 NOTE — PLAN OF CARE
Patient scored 5 on MSSA chart at 0803 this morning. Patient's MSSA scores have been under 8 for 24 hours and patient has not received valium or ativan in 24 hours. Patient now out of detox per unit protocol.

## 2023-06-18 ENCOUNTER — HEALTH MAINTENANCE LETTER (OUTPATIENT)
Age: 58
End: 2023-06-18

## 2023-08-08 ENCOUNTER — HOSPITAL ENCOUNTER (OUTPATIENT)
Dept: BEHAVIORAL HEALTH | Facility: CLINIC | Age: 58
Discharge: HOME | DRG: 897 | End: 2023-08-08
Attending: FAMILY MEDICINE | Admitting: FAMILY MEDICINE
Payer: COMMERCIAL

## 2023-08-08 ENCOUNTER — HOSPITAL ENCOUNTER (INPATIENT)
Facility: CLINIC | Age: 58
LOS: 7 days | Discharge: NURSING, REHAB OR LONG-TERM CARE | DRG: 897 | End: 2023-08-15
Attending: EMERGENCY MEDICINE | Admitting: PSYCHIATRY & NEUROLOGY
Payer: COMMERCIAL

## 2023-08-08 VITALS — BODY MASS INDEX: 22.35 KG/M2 | WEIGHT: 165 LBS | HEIGHT: 72 IN

## 2023-08-08 DIAGNOSIS — F10.220 ALCOHOL DEPENDENCE WITH UNCOMPLICATED INTOXICATION (H): ICD-10-CM

## 2023-08-08 DIAGNOSIS — R73.03 PREDIABETES: ICD-10-CM

## 2023-08-08 DIAGNOSIS — F10.20 ALCOHOL USE DISORDER, SEVERE, DEPENDENCE (H): ICD-10-CM

## 2023-08-08 DIAGNOSIS — F10.20 ALCOHOL USE DISORDER, SEVERE, DEPENDENCE (H): Primary | ICD-10-CM

## 2023-08-08 DIAGNOSIS — M10.9 POLYARTICULAR GOUT: Primary | ICD-10-CM

## 2023-08-08 DIAGNOSIS — F33.1 MODERATE EPISODE OF RECURRENT MAJOR DEPRESSIVE DISORDER (H): ICD-10-CM

## 2023-08-08 LAB
ALBUMIN SERPL BCG-MCNC: 4 G/DL (ref 3.5–5.2)
ALCOHOL BREATH TEST: 0 (ref 0–0.01)
ALP SERPL-CCNC: 117 U/L (ref 40–129)
ALT SERPL W P-5'-P-CCNC: 26 U/L (ref 0–70)
AMPHETAMINES UR QL SCN: NORMAL
ANION GAP SERPL CALCULATED.3IONS-SCNC: 21 MMOL/L (ref 7–15)
AST SERPL W P-5'-P-CCNC: 56 U/L (ref 0–45)
BARBITURATES UR QL SCN: NORMAL
BASOPHILS # BLD AUTO: 0 10E3/UL (ref 0–0.2)
BASOPHILS NFR BLD AUTO: 0 %
BENZODIAZ UR QL SCN: NORMAL
BILIRUB SERPL-MCNC: 0.4 MG/DL
BUN SERPL-MCNC: 58.9 MG/DL (ref 6–20)
BZE UR QL SCN: NORMAL
CALCIUM SERPL-MCNC: 9.3 MG/DL (ref 8.6–10)
CANNABINOIDS UR QL SCN: NORMAL
CHLORIDE SERPL-SCNC: 91 MMOL/L (ref 98–107)
CREAT SERPL-MCNC: 1.66 MG/DL (ref 0.67–1.17)
DEPRECATED HCO3 PLAS-SCNC: 19 MMOL/L (ref 22–29)
EOSINOPHIL # BLD AUTO: 0.1 10E3/UL (ref 0–0.7)
EOSINOPHIL NFR BLD AUTO: 0 %
ERYTHROCYTE [DISTWIDTH] IN BLOOD BY AUTOMATED COUNT: 18.4 % (ref 10–15)
ETHANOL SERPL-MCNC: <0.01 G/DL
GFR SERPL CREATININE-BSD FRML MDRD: 48 ML/MIN/1.73M2
GLUCOSE SERPL-MCNC: 103 MG/DL (ref 70–99)
HCT VFR BLD AUTO: 24.6 % (ref 40–53)
HGB BLD-MCNC: 8.4 G/DL (ref 13.3–17.7)
IMM GRANULOCYTES # BLD: 0.3 10E3/UL
IMM GRANULOCYTES NFR BLD: 2 %
LYMPHOCYTES # BLD AUTO: 1.3 10E3/UL (ref 0.8–5.3)
LYMPHOCYTES NFR BLD AUTO: 10 %
MCH RBC QN AUTO: 30 PG (ref 26.5–33)
MCHC RBC AUTO-ENTMCNC: 34.1 G/DL (ref 31.5–36.5)
MCV RBC AUTO: 88 FL (ref 78–100)
MONOCYTES # BLD AUTO: 1 10E3/UL (ref 0–1.3)
MONOCYTES NFR BLD AUTO: 8 %
NEUTROPHILS # BLD AUTO: 9.9 10E3/UL (ref 1.6–8.3)
NEUTROPHILS NFR BLD AUTO: 80 %
NRBC # BLD AUTO: 0 10E3/UL
NRBC BLD AUTO-RTO: 0 /100
OPIATES UR QL SCN: NORMAL
PLATELET # BLD AUTO: 427 10E3/UL (ref 150–450)
POTASSIUM SERPL-SCNC: 3.3 MMOL/L (ref 3.4–5.3)
PROT SERPL-MCNC: 8.5 G/DL (ref 6.4–8.3)
RBC # BLD AUTO: 2.8 10E6/UL (ref 4.4–5.9)
SODIUM SERPL-SCNC: 131 MMOL/L (ref 136–145)
WBC # BLD AUTO: 12.5 10E3/UL (ref 4–11)

## 2023-08-08 PROCEDURE — 36415 COLL VENOUS BLD VENIPUNCTURE: CPT | Performed by: EMERGENCY MEDICINE

## 2023-08-08 PROCEDURE — 128N000004 HC R&B CD ADULT

## 2023-08-08 PROCEDURE — 82075 ASSAY OF BREATH ETHANOL: CPT | Performed by: EMERGENCY MEDICINE

## 2023-08-08 PROCEDURE — H0001 ALCOHOL AND/OR DRUG ASSESS: HCPCS

## 2023-08-08 PROCEDURE — 99285 EMERGENCY DEPT VISIT HI MDM: CPT | Performed by: EMERGENCY MEDICINE

## 2023-08-08 PROCEDURE — 82077 ASSAY SPEC XCP UR&BREATH IA: CPT | Performed by: EMERGENCY MEDICINE

## 2023-08-08 PROCEDURE — 80053 COMPREHEN METABOLIC PANEL: CPT | Performed by: EMERGENCY MEDICINE

## 2023-08-08 PROCEDURE — 250N000013 HC RX MED GY IP 250 OP 250 PS 637: Performed by: PSYCHIATRY & NEUROLOGY

## 2023-08-08 PROCEDURE — 250N000013 HC RX MED GY IP 250 OP 250 PS 637: Performed by: EMERGENCY MEDICINE

## 2023-08-08 PROCEDURE — 80307 DRUG TEST PRSMV CHEM ANLYZR: CPT | Performed by: EMERGENCY MEDICINE

## 2023-08-08 PROCEDURE — 85025 COMPLETE CBC W/AUTO DIFF WBC: CPT | Performed by: EMERGENCY MEDICINE

## 2023-08-08 RX ORDER — NICOTINE 21 MG/24HR
1 PATCH, TRANSDERMAL 24 HOURS TRANSDERMAL DAILY
Status: DISCONTINUED | OUTPATIENT
Start: 2023-08-08 | End: 2023-08-08

## 2023-08-08 RX ORDER — ALLOPURINOL 100 MG/1
50 TABLET ORAL DAILY
Status: ON HOLD | COMMUNITY
End: 2023-08-10

## 2023-08-08 RX ORDER — TRAZODONE HYDROCHLORIDE 50 MG/1
50 TABLET, FILM COATED ORAL
Status: DISCONTINUED | OUTPATIENT
Start: 2023-08-08 | End: 2023-08-11

## 2023-08-08 RX ORDER — MULTIPLE VITAMINS W/ MINERALS TAB 9MG-400MCG
1 TAB ORAL DAILY
Status: DISCONTINUED | OUTPATIENT
Start: 2023-08-08 | End: 2023-08-08 | Stop reason: ALTCHOICE

## 2023-08-08 RX ORDER — DIAZEPAM 5 MG
5-20 TABLET ORAL EVERY 30 MIN PRN
Status: DISCONTINUED | OUTPATIENT
Start: 2023-08-08 | End: 2023-08-15 | Stop reason: HOSPADM

## 2023-08-08 RX ORDER — FOLIC ACID 1 MG/1
1 TABLET ORAL DAILY
Status: DISCONTINUED | OUTPATIENT
Start: 2023-08-08 | End: 2023-08-08 | Stop reason: ALTCHOICE

## 2023-08-08 RX ORDER — FOLIC ACID 1 MG/1
1 TABLET ORAL DAILY
Status: DISCONTINUED | OUTPATIENT
Start: 2023-08-09 | End: 2023-08-15 | Stop reason: HOSPADM

## 2023-08-08 RX ORDER — DIAZEPAM 5 MG
5-20 TABLET ORAL EVERY 30 MIN PRN
Status: DISCONTINUED | OUTPATIENT
Start: 2023-08-08 | End: 2023-08-08 | Stop reason: ALTCHOICE

## 2023-08-08 RX ORDER — ESCITALOPRAM OXALATE 10 MG/1
1 TABLET ORAL DAILY
Status: ON HOLD | COMMUNITY
Start: 2023-08-08 | End: 2023-08-10

## 2023-08-08 RX ORDER — ESCITALOPRAM OXALATE 10 MG/1
10 TABLET ORAL DAILY
Status: DISCONTINUED | OUTPATIENT
Start: 2023-08-09 | End: 2023-08-15 | Stop reason: HOSPADM

## 2023-08-08 RX ORDER — GABAPENTIN 100 MG/1
100 CAPSULE ORAL 3 TIMES DAILY
Status: DISCONTINUED | OUTPATIENT
Start: 2023-08-09 | End: 2023-08-15 | Stop reason: HOSPADM

## 2023-08-08 RX ORDER — MAGNESIUM HYDROXIDE/ALUMINUM HYDROXICE/SIMETHICONE 120; 1200; 1200 MG/30ML; MG/30ML; MG/30ML
30 SUSPENSION ORAL EVERY 4 HOURS PRN
Status: DISCONTINUED | OUTPATIENT
Start: 2023-08-08 | End: 2023-08-15 | Stop reason: HOSPADM

## 2023-08-08 RX ORDER — ATORVASTATIN CALCIUM 20 MG/1
20 TABLET, FILM COATED ORAL DAILY
Status: DISCONTINUED | OUTPATIENT
Start: 2023-08-09 | End: 2023-08-15 | Stop reason: HOSPADM

## 2023-08-08 RX ORDER — ATORVASTATIN CALCIUM 20 MG/1
20 TABLET, FILM COATED ORAL DAILY
Status: ON HOLD | COMMUNITY
Start: 2023-03-27 | End: 2023-08-10

## 2023-08-08 RX ORDER — HYDROXYZINE HYDROCHLORIDE 25 MG/1
25 TABLET, FILM COATED ORAL EVERY 4 HOURS PRN
Status: DISCONTINUED | OUTPATIENT
Start: 2023-08-08 | End: 2023-08-15 | Stop reason: HOSPADM

## 2023-08-08 RX ORDER — ACETAMINOPHEN 325 MG/1
650 TABLET ORAL EVERY 4 HOURS PRN
Status: DISCONTINUED | OUTPATIENT
Start: 2023-08-08 | End: 2023-08-15 | Stop reason: HOSPADM

## 2023-08-08 RX ORDER — ALLOPURINOL 100 MG/1
100 TABLET ORAL DAILY
Status: DISCONTINUED | OUTPATIENT
Start: 2023-08-09 | End: 2023-08-09

## 2023-08-08 RX ORDER — FOLIC ACID 1 MG/1
1 TABLET ORAL DAILY
Status: ON HOLD | COMMUNITY
Start: 2023-07-10 | End: 2023-08-10

## 2023-08-08 RX ORDER — POTASSIUM CHLORIDE 20MEQ/15ML
20 LIQUID (ML) ORAL ONCE
Status: COMPLETED | OUTPATIENT
Start: 2023-08-08 | End: 2023-08-08

## 2023-08-08 RX ADMIN — THIAMINE HCL TAB 100 MG 100 MG: 100 TAB at 16:59

## 2023-08-08 RX ADMIN — TRAZODONE HYDROCHLORIDE 50 MG: 50 TABLET ORAL at 23:06

## 2023-08-08 RX ADMIN — DIAZEPAM 10 MG: 5 TABLET ORAL at 20:04

## 2023-08-08 RX ADMIN — MULTIPLE VITAMINS W/ MINERALS TAB 1 TABLET: TAB at 16:59

## 2023-08-08 RX ADMIN — FOLIC ACID 1 MG: 1 TABLET ORAL at 16:59

## 2023-08-08 RX ADMIN — POTASSIUM CHLORIDE 20 MEQ: 40 SOLUTION ORAL at 18:27

## 2023-08-08 RX ADMIN — DIAZEPAM 5 MG: 5 TABLET ORAL at 17:06

## 2023-08-08 ASSESSMENT — ANXIETY QUESTIONNAIRES
GAD7 TOTAL SCORE: 16
IF YOU CHECKED OFF ANY PROBLEMS ON THIS QUESTIONNAIRE, HOW DIFFICULT HAVE THESE PROBLEMS MADE IT FOR YOU TO DO YOUR WORK, TAKE CARE OF THINGS AT HOME, OR GET ALONG WITH OTHER PEOPLE: VERY DIFFICULT
GAD7 TOTAL SCORE: 16
4. TROUBLE RELAXING: NEARLY EVERY DAY
6. BECOMING EASILY ANNOYED OR IRRITABLE: NOT AT ALL
3. WORRYING TOO MUCH ABOUT DIFFERENT THINGS: NEARLY EVERY DAY
5. BEING SO RESTLESS THAT IT IS HARD TO SIT STILL: MORE THAN HALF THE DAYS
2. NOT BEING ABLE TO STOP OR CONTROL WORRYING: NEARLY EVERY DAY
1. FEELING NERVOUS, ANXIOUS, OR ON EDGE: NEARLY EVERY DAY
7. FEELING AFRAID AS IF SOMETHING AWFUL MIGHT HAPPEN: MORE THAN HALF THE DAYS

## 2023-08-08 ASSESSMENT — ACTIVITIES OF DAILY LIVING (ADL)
WALKING_OR_CLIMBING_STAIRS_DIFFICULTY: NO
CONCENTRATING,_REMEMBERING_OR_MAKING_DECISIONS_DIFFICULTY: NO
DIFFICULTY_COMMUNICATING: NO
ADLS_ACUITY_SCORE: 35
FALL_HISTORY_WITHIN_LAST_SIX_MONTHS: NO
ADLS_ACUITY_SCORE: 41
VISION_MANAGEMENT: GLASSES
CHANGE_IN_FUNCTIONAL_STATUS_SINCE_ONSET_OF_CURRENT_ILLNESS/INJURY: NO
DOING_ERRANDS_INDEPENDENTLY_DIFFICULTY: NO
DRESSING/BATHING_DIFFICULTY: NO
TOILETING_ISSUES: NO
DIFFICULTY_EATING/SWALLOWING: NO
ADLS_ACUITY_SCORE: 35
ADLS_ACUITY_SCORE: 35
WEAR_GLASSES_OR_BLIND: YES

## 2023-08-08 ASSESSMENT — COLUMBIA-SUICIDE SEVERITY RATING SCALE - C-SSRS
TOTAL  NUMBER OF ABORTED OR SELF INTERRUPTED ATTEMPTS LIFETIME: NO
2. HAVE YOU ACTUALLY HAD ANY THOUGHTS OF KILLING YOURSELF?: NO
6. HAVE YOU EVER DONE ANYTHING, STARTED TO DO ANYTHING, OR PREPARED TO DO ANYTHING TO END YOUR LIFE?: NO
ATTEMPT LIFETIME: NO
TOTAL  NUMBER OF INTERRUPTED ATTEMPTS LIFETIME: NO
1. HAVE YOU WISHED YOU WERE DEAD OR WISHED YOU COULD GO TO SLEEP AND NOT WAKE UP?: NO

## 2023-08-08 ASSESSMENT — PAIN SCALES - GENERAL: PAINLEVEL: MODERATE PAIN (4)

## 2023-08-08 ASSESSMENT — PATIENT HEALTH QUESTIONNAIRE - PHQ9: SUM OF ALL RESPONSES TO PHQ QUESTIONS 1-9: 20

## 2023-08-08 NOTE — MEDICATION SCRIBE - ADMISSION MEDICATION HISTORY
ADDENDUM:     Upon review of patient's medications it appears that patient's PTA dose of allopurinol is only 50 mg daily. This change was made to the PTA medication list and communicated with patient's provider. Thank you, Cristal PharmD   *[id number]             Medication Scribe Admission Medication History    Admission medication history is complete. The information provided in this note is only as accurate as the sources available at the time of the update.    Medication reconciliation/reorder completed by provider prior to medication history? No    Information Source(s): Patient and CareEverywhere/SureScripts via in-person    Pertinent Information: N/A    Changes made to PTA medication list:  Added: atorvastatin, folic acid  Deleted: sodium bicarbonate  Changed: lexapro 5mg changed to lexapro 10mg    Medication Affordability:       Allergies reviewed with patient and updates made in EHR: yes    Medication History Completed By: Juanita Smith 8/8/2023 5:13 PM    Prior to Admission medications    Medication Sig Last Dose Taking? Auth Provider Long Term End Date   allopurinol (ZYLOPRIM) 100 MG tablet Take 50 mg by mouth daily 8/8/2023 Yes Reported, Patient     atorvastatin (LIPITOR) 20 MG tablet Take 20 mg by mouth daily 8/7/2023 at AM Yes Reported, Patient Yes    blood glucose (ACCU-CHEK GUIDE) test strip Test 2 times per day. Pharmacy allowed to substitute per patient's insurance. More than a month Yes Reported, Patient     blood glucose monitoring (SOFTCLIX) lancets Test 2 times per day. Call your doctor if blood glucose is > 300. More than a month Yes Reported, Patient     Blood Glucose Monitoring Suppl (ACCU-CHEK GUIDE) w/Device KIT  More than a month Yes Reported, Patient     escitalopram (LEXAPRO) 10 MG tablet Take 1 tablet by mouth daily 8/8/2023 at AM Yes Reported, Patient Yes    folic acid (FOLVITE) 1 MG tablet Take 1 mg by mouth daily 8/7/2023 at PM Yes Reported, Patient     gabapentin (NEURONTIN) 100 MG  capsule Take 1 capsule (100 mg) by mouth 3 times daily 8/8/2023 at AM Yes Valentin Koehler MD Yes    naltrexone (DEPADE/REVIA) 50 MG tablet Take 1 tablet (50 mg) by mouth daily More than a month Yes Valentin Koehler MD Yes    traZODone (DESYREL) 50 MG tablet Take 50 mg by mouth nightly as needed 8/7/2023 at PM Yes Reported, Patient Yes

## 2023-08-08 NOTE — PROGRESS NOTES
"    [facility]      PATIENT'S NAME: Monty Cox  PREFERRED NAME: Monty  PRONOUNS:he/him/his  MRN: [id number]  : 1965  ADDRESS: [address] [address]  ACCT. NUMBER:  [id number]  DATE OF SERVICE: 23  START TIME: 8:00 a.m.  END TIME: 9:30 a.m.  PREFERRED PHONE: [phone]  May we leave a program related message: Yes  SERVICE MODALITY:  In-person    UNIVERSAL ADULT Substance Use Disorder DIAGNOSTIC ASSESSMENT    Identifying Information:  Patient is a 57 year old, .  The pronoun use throughout this assessment reflects the patient's chosen pronoun.  Patient was referred for an assessment by self.  Patient attended the session alone.      Chief Complaint:   The reason for seeking services at this time is: Per Assessment at 3/22/2021: \"I have been sober for a while and had relapsed and needed professional help to maintain sobriety\"   Current: \"I had a relapse after completing [facility] residential about a month and a half ago.  My wife told me I needed to get out of the house and get some help.  [facility] was not a match for me at all\".  The problem(s) began about 17-23 years. Patient has attempted to resolve these concerns in the past through 1 IP and 1 OP ([facility] for IP and [facility] for OP).  Patient does not appear to be in severe withdrawal, an imminent safety risk to self or others, or requiring immediate medical attention and may proceed with the assessment interview.     Social/Family History:  Patient reported they grew up in [place] [place] [facility].  They were raised by my mother and father co-parenting after they split up when the pt was 6 or 7.   Patient reported that their childhood was good.  Pt has 4 half-sisters and one half-brother still living and one  .  Patient describes current relationships with family of origin as \"very good\".       The patient describes their cultural background as -Black.  " "Cultural influences and impact on patient's life structure, values, norms, and healthcare: none identified.  \"greatly affects me'\".  Contextual influences on patient's health include: Individual Factors: co-occurring MH/ALEXA.  Family Factors: some HX of AUD. Patient identified their preferred language to be English. Patient reported they does not need the assistance of an  or other support involved in therapy.  Patient reports they are not involved in community of jg activities.  They reports spirituality impacts recovery in the following ways: \"medium to high impact in a positive way\".      Patient reported had no significant delays in developmental tasks.   Patient's highest education level was some college. Patient identified the following learning problems: none reported.  Patient reports they are  able to understand written materials.     Patient reported the following relationship history \"Yes, we were together for 8 years before I  my wife\".  Patient's current relationship status is  for 14 years and together for 6 years prior.   Patient identified their sexual orientation as heterosexual.  Patient reported having no child(isidro).      Patient's current living/housing situation involves staying in own home/apartment.  They live with his wife and they report that housing is stable. \"Currently living with a supportive friend\".  Patient identified mother, father, friends, siblings and spouse as part of their support system.  Patient identified the quality of these relationships as good.  \"With my wife, we are struggling\".      Patient reports engaging in the following recreational/leisure activities: \"painting, gardening, music, biking, cooking\".  Patient is currently unemployed.  Patient reports their income is obtained through spouse.  Patient does not identify finances as a current stressor.       Patient denies substance related arrests or legal issues.  Patient denies being on " "probation / parole / under the jurisdiction of the court.    Patient's Strengths and Limitations:  Patient identified the following strengths or resources that will help them succeed in treatment: commitment to health and well being, jg / spirituality, friends / good social support, family support, motivation, sober support group / recovery support  and sponsor. Things that may interfere with the patient's success in treatment include: \"none\".     Assessments:  The following assessments were completed by patient for this visit:  PHQ9:       3/22/2021     1:00 PM 8/8/2023     7:00 AM   PHQ-9 SCORE   PHQ-9 Total Score 9 20     GAD7:       3/22/2021     1:00 PM 8/8/2023     7:00 AM   PILI-7 SCORE   Total Score 10 16     PROMIS 10-Global Health (all questions and answers displayed):       8/8/2023     7:00 AM   PROMIS 10   In general, would you say your health is: 2   In general, would you say your quality of life is: 1   In general, how would you rate your physical health? 2   In general, how would you rate your mental health, including your mood and your ability to think? 1   In general, how would you rate your satisfaction with your social activities and relationships? 1   In general, please rate how well you carry out your usual social activities and roles. (This includes activities at home, at work and in your community, and responsibilities as a parent, child, spouse, employee, friend, etc.) 1   To what extent are you able to carry out your everyday physical activities such as walking, climbing stairs, carrying groceries, or moving a chair? 4   In the past 7 days, how often have you been bothered by emotional problems such as feeling anxious, depressed, or irritable? 5   In the past 7 days, how would you rate your fatigue on average? 3   In the past 7 days, how would you rate your pain on average, where 0 means no pain, and 10 means worst imaginable pain? 6   Global Mental Health Score 4   Global Physical Health " Score 12   PROMIS TOTAL - SUBSCORES 16     [place] Suicide Severity Rating Scale (Lifetime/Recent)      3/22/2021     1:00 PM 3/16/2023     7:00 PM 8/8/2023     8:00 AM   [place] Suicide Severity Rating (Lifetime/Recent)   Q1 Wish to be Dead (Lifetime) No No    Q2 Non-Specific Active Suicidal Thoughts (Lifetime) No No    Most Severe Ideation Rating (Lifetime) NA     Most Severe Ideation Description (Lifetime) NA     Frequency (Lifetime) NA     Duration (Lifetime) NA     Controllability (Lifetime) NA     Protective Factors  (Lifetime) NA     Reasons for Ideation (Lifetime) NA     Q1 Wished to be Dead (Past Month)  no    Q2 Suicidal Thoughts (Past Month)  no    Q3 Suicidal Thought Method  no    Q4 Suicidal Intent without Specific Plan  no    Q5 Suicide Intent with Specific Plan  no    Q6 Suicide Behavior (Lifetime)  no    Level of Risk per Screen  low risk    RETIRED: 1. Wish to be Dead (Recent) No     RETIRED: 2. Non-Specific Active Suicidal Thoughts (Recent) No     3. Active Suicidal Ideation with any Methods (Not Plan) Without Intent to Act (Lifetime) No     RETIRED: 3. Active Suicidal Ideation with any Methods (Not Plan) Without Intent to Act (Recent) No     RETIRE: 4. Active Suicidal Ideation with Some Intent to Act, Without Specific Plan (Lifetime) No     4. Active Suicidal Ideation with Some Intent to Act, Without Specific Plan (Recent) No     RETIRE: 5. Active Suicidal Ideation with Specific Plan and Intent (Lifetime) No     RETIRED: 5. Active Suicidal Ideation with Specific Plan and Intent (Recent) No     Most Severe Ideation Rating (Past Month) NA     Most Severe Ideation Description (Past Month) NA     Frequency (Past Month) NA     Duration (Past Month) NA     Controllability (Past Month) NA     Protective Factors (Past Month) NA     Reasons for Ideation (Past Month) NA     Actual Attempt (Lifetime) No     Actual Attempt (Past 3 Months) No     Has subject engaged in non-suicidal self-injurious behavior?  (Lifetime) No     Has subject engaged in non-suicidal self-injurious behavior? (Past 3 Months) No     Interrupted Attempts (Lifetime) No     Interrupted Attempts (Past 3 Months) No     Aborted or Self-Interrupted Attempt (Lifetime) No     Aborted or Self-Interrupted Attempt (Past 3 Months) No     Preparatory Acts or Behavior (Lifetime) No     Preparatory Acts or Behavior (Past 3 Months) No     Most Recent Attempt Actual Lethality Code NA     Most Lethal Attempt Actual Lethality Code NA     Initial/First Attempt Actual Lethality Code NA     Q1 Wish to be Dead (Lifetime)   N   Q2 Non-Specific Active Suicidal Thoughts (Lifetime)   N   Actual Attempt (Lifetime)   N   Has subject engaged in non-suicidal self-injurious behavior? (Lifetime)   N   Interrupted Attempts (Lifetime)   N   Aborted or Self-Interrupted Attempt (Lifetime)   N   Preparatory Acts or Behavior (Lifetime)   N   Calculated C-SSRS Risk Score (Lifetime/Recent)   No Risk Indicated     GAIN-sliding scale:      8/8/2023     7:00 AM   When was the last time that you had significant problems...   with feeling very trapped, lonely, sad, blue, depressed or hopeless about the future? Past month   with sleep trouble, such as bad dreams, sleeping restlessly, or falling asleep during the day? Past Month   with feeling very anxious, nervous, tense, scared, panicked or like something bad was going to happen? Past month   with becoming very distressed & upset when something reminded you of the past? 2 to 12 months ago   with thinking about ending your life or committing suicide? Never          8/8/2023     7:00 AM   When was the last time that you did the following things 2 or more times?   Lied or conned to get things you wanted or to avoid having to do something? 2 to 12 months ago   Had a hard time paying attention at school, work or home? Past month   Had a hard time listening to instructions at school, work or home? 2 to 12 months ago   Were a bully or threatened  "other people? Never   Started physical fights with other people? Never       Personal and Family Medical History:  Patient did not report a family history of mental health concerns.  Patient reports family history is not on file..       Patient reported the following previous mental health diagnoses: \"depression and anxiety\".  Patient reports their primary mental health symptoms include:\"tired, being reclusive\" and these do impact his ability to function.   Patient has received mental health services in the past: \"therapy briefly, medication, PCP\". Psychiatric Hospitalizations: None.  Patient denies a history of civil commitment.  Current mental health services/providers include:  \"therapy, medication, PCP\".     Patient has had a physical exam to rule out medical causes for current symptoms.  Date of last physical exam was within the past year. Client was encouraged to follow up with PCP if symptoms were to develop. The patient has a non-[place] Primary Care Provider. Their PCP is at [facility].  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 Patient reports  pain from Gout in his ankles and is taking medication. There are not significant appetite / nutritional concerns / weight changes.  \"Weight loss lately\".  Patient does not report a history of an eating disorder.  Patient does not report a history of head injury / trauma / cognitive impairment.  NA     Patient reports current meds as:   Current Outpatient Medications   Medication    ACCU-CHEK GUIDE test strip    allopurinol (ZYLOPRIM) 100 MG tablet    blood glucose (ACCU-CHEK GUIDE) test strip    blood glucose monitoring (ACCU-CHEK ERIKA PLUS) meter device kit    blood glucose monitoring (SOFTCLIX) lancets    blood glucose monitoring (SOFTCLIX) lancets    Blood Glucose Monitoring Suppl (ACCU-CHEK GUIDE) w/Device KIT    escitalopram (LEXAPRO) 5 MG tablet    gabapentin " "(NEURONTIN) 100 MG capsule    naltrexone (DEPADE/REVIA) 50 MG tablet    traZODone (DESYREL) 50 MG tablet    sodium bicarbonate 650 MG tablet     No current facility-administered medications for this encounter.      Medication Adherence:  Patient reports taking prescribed medications as prescribed.     Patient Allergies:  \"none\"    Medical History:    History reviewed. No pertinent past medical history.     Substance Use:  Patient reported the following biological family members or relatives with chemical health issues: Father reportedly used alcohol in the past.  \"He just stopped one day\".  Nephew reportedly abuses alcohol .  Patient has received substance use disorder   treatment in the past.  Patient reports the following dates and locations of treatment services:  (2 IP and 1 OP ([facility] for IP and [facility] for OP).  Patient has ever been to detox 4 times.  Patient is not currently receiving any chemical dependency treatment other than working with a therapist with knowledge of addiction.. Patient reports they currently attend the following support groups: AA every Tuesday.                   X = Primary Drug Used    Age of First Use Most Recent Pattern of Use and Duration   Need enough information to show pattern (both frequency and amounts) and to show tolerance for each chemical that has a diagnosis    Date of last use and time, if needed    Withdrawal Potential? Requiring special care Method of use  (oral, smoked, snort, IV, etc)        Alcohol       20 Per Assessment at 3/22/2021: Drinking 4-5 drinks with friends possibly on weekends and maybe once or twice during the week.  I had 6-7 years of sobriety.  My drinking has gone out of hand for the last 15-20 years( drinking 4-8 drinks about 4 nights a week.  Prior to couple months ago. It was 1-2 pints daily, and then reduced to few drinks for the past couple weeks.  Current:\"past few years a pint a day of straight Vodka\"  2023: \"about a pint a " "day\"    Last night,  \"half a pint of straight Vodka'.  no oral        Marijuana/  Hashish    No use  \"tried it a long, long time ago\"              Cocaine/Crack       No use                Meth/  Amphetamines    No use                Heroin       No use                Other Opiates/  Synthetics    No use                Inhalants       No use                Benzodiazepines       No use                Hallucinogens       No use  \"acid once in college and Ecstasy once\"              Barbiturates/  Sedatives/  Hypnotics No use                Over-the-Counter Drugs    No use                Other       No use  \"occasionally\"              Nicotine       No use                 Patient reported the following problems as a result of their substance use: work absences, financial, relationship problems, worsens anxiety and depression.  Patient is concerned about substance use. Patient reports his wife, friends and family are concerned about their substance use.  Patient reports their recovery goals are \"stopping drinking altogether and not relying on it'.      Patient reports experiencing the following withdrawal symptoms within the past 12 months:  shaky/jittery/tremors, unable to sleep, agitation,  fatigue, sad/depressed feeling, seizures, diminished appetite, unable to eat and anxiety/worry and the following within the past 30 days: none.   Patients reports urges to use Alcohol.  Patient reports he has used more Alcohol than intended and over a longer period of time than intended. Patient reports he has had unsuccessful attempts to cut down or control use of Alcohol.  Patient reports longest period of abstinence was 6-7 years and return to use was due to \"stress and anxiety; family health related issues\". Patient reports he has needed to use more Alcohol to achieve the same effect.  Patient does  report diminished effect with use of same amount of Alcohol.      Patient does report a great deal of time is spent in activities " "necessary to obtain, use, or recover from Alcohol effects.  Patient does report important social, occupational, or recreational activities are given up or reduced because of Alcohol use.  Alcohol use is continued despite knowledge of having a persistent or recurrent physical or psychological problem that is likely to have caused or exacerbated by use.  Patient reports the following problem behaviors while under the influence of substances \"more isolated, talk more than usual\".      Patient reports substance use has not ever impacted their ability to function in a school setting. Patient reports substance use has ever impacted their ability to function in a work setting.  Patients demographics and history impact their recovery in the following ways:  Some family HX of AUD. Patient reports engaging in the following recreation/leisure activities while using:  \"just isolating at home usually in my art room\".  Patient reports the following people are supportive of recovery: wife, family and friends.     Patient does not have a history of gambling concerns and/or treatment.  Patient does not have other addictive behaviors he is concerned about.          Dimension Scale Ratings:     Dimension 1 -  Acute Intoxication/Withdrawal: 2 - Moderate Problem-Pt reports drinking \"a pint of straight Vodka daily for the past few years.  He reports some withdrawal in the past including seizure.  He is aware of the risks and resources for detox programs have been provided.  Dimension 2 - Biomedical: 2 - Moderate Problem-Patient has had a physical exam to rule out medical causes for current symptoms.  Date of last physical exam was within the past year. Client was encouraged to follow up with PCP if symptoms were to develop. The patient has a non-[place] Primary Care Provider. [place] [facility]  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 Patient reports  pain from Gout in his ankles and is taking medication. There are not significant appetite / nutritional concerns / weight changes.  \"Weight loss lately\".  Patient does not report a history of an eating disorder.  Patient does not report a history of head injury / trauma / cognitive impairment.  NA  Dimension 3 - Emotional/Behavioral/Cognitive Conditions: 2 - Moderate Problem-Patient reported the following previous mental health diagnoses: \"depression and anxiety\".  Patient reports their primary mental health symptoms include:\"tired, being reclusive\" and these do impact his ability to function.   Patient has received mental health services in the past: \"therapy briefly, medication, PCP\". Psychiatric Hospitalizations: None.  Patient denies a history of civil commitment.  Current mental health services/providers include:  \"therapy, medication, PCP\".  He reports no SA/SIB or SI in his lifetime.  Dimension 4 - Readiness to Change: 0-No Problem-The pt expresses a strong desire to participate in ALEXA Tx.  Dimension 5 - Relapse/Continued Use/ Continued Problem Potential: 4-Extreme Problem- The pt meets criteria for alcohol use disorder-severe.  He has co-occurring MH DX and AUD as well as some family HX of AUD which places him at a high risk for relapse.  He has experienced both IP and OP in the past.   Dimension 6 - Recovery Environment:   2 - Moderate Problem        Significant Losses / Trauma / Abuse / Neglect Issues:   Patient did not serve in the .  There are indications or report of significant loss, trauma, abuse or neglect issues related to: are no indications and client denies any losses, trauma, abuse, or neglect concerns.  Concerns for possible neglect are not present. NA     Safety Assessment:   Current Safety Concerns:Patient denies current homicidal ideation and behaviors.  Patient denies current self-injurious ideation and behaviors.    Patient denied risk behaviors " "associated with substance use.  Patient denies any high risk behaviors associated with mental health symptoms.  Patient reports the following current concerns for their personal safety: None.  Patient reports there are not  firearms in the house. NA.      History of Safety Concerns:  Patient denied a history of homicidal ideation.     Patient denied a history of personal safety concerns.    Patient denied a history of assaultive behaviors.    Patient denied a history of sexual assault behaviors.     Patient reports a history of risk behaviors associated with substance use, \"driving and harm to health\"  Patient denies any history of high risk behaviors associated with mental health symptoms.  Patient reports the following protective factors: spirituality, restricted access to lethal means weapon, safe and stable environment, help seeking behaviors when distressed for his alcohol use problem, adherence with prescribed medication, agreement to use safety plan, living with other people, uses community crisis resources, healthy fear of risky behaviors or pain and access to a variety of clinical interventions     Risk Plan:  See Recommendations for Safety and Risk Management Plan     Review of Symptoms per patient report:  Substance Use:  vomiting, hangovers, daily use, family relationship problems due to substance use and cravings/urges to use      Diagnostic Criteria:  OP BEH ALEXA CRITERIA: Substance is often taken in larger amounts or over a longer period than was intended.  Met for:  Alcohol, There is persistent desire or unsuccessful efforts to cut down or control use of the substance.  Met for:  Alcohol,  A great deal of time is spent in activities necessary to obtain the substance, use the substance, or recover from its effects.  Met for:  Alcohol, Craving, or a strong desire or urge to use the substance.  Met for:  Alcohol, Continued use of the substance despite having persistent or recurrent social or " interpersonal problems caused or exacerbated by the effects of its use.  Met for:  Alcohol, Important social, occupational, or recreational activities are given up or reduced because of the substance.  Met for:  Alcohol, Use of the substance is continued despite knowledge of having a persistent or recurrent physical or psychological problem that is likely to have been cause or exacerbated by the substance.  Met for:  Alcohol, Tolerance:  either a need for markedly increased amounts of the substance to achieve the desired effect or a markedly diminished effect with continued use of the dame amount of the substance.  Met for:  Alcohol, Withdrawal:  either patient endorses characteristic withdrawal syndrome for the substance or the substance (or closely related substance) is taken to relieve or avoid withdrawal symptoms.  Met for:  Alcohol        Collateral Contact Summary:   Collateral contacts contributing to this assessment:  NA     If court related records were reviewed, summarize here: NA     Information from collateral contacts supported/largely agreed with information from the client and associated risk ratings.     Information in this assessment was obtained from the medical record and provided by patient who is a good historian.    Patient will have open access to their mental health medical record.     Diagnostic Criteria: 1.) Alcohol/drug is often taken in larger amounts or over a longer period than was intended.  Met for Alcohol.  2.) There is a persistent desire or unsuccessful efforts to cut down or control alcohol/drug use.  Met for Alcohol.  3.) A great deal of time is spent in activities necessary to obtain alcohol, use alcohol, or recover from its effects.  Met for Alcohol.  4.) Craving, or a strong desire or urge to use alcohol/drug.  Met for Alcohol.  5.) Patient does report a great deal of time is spent in activities necessary to obtain, use, or recover from Alcohol effects.  6.) Continued alcohol  use despite having persistent or recurrent social or interpersonal problems caused or exacerbated by the effects of alcohol/drug.  Met for Alcohol.  7.) Important social, occupational, or recreational activities are given up or reduced because of alcohol/drug use.  Met for Alcohol.  9.) Alcohol/drug use is continued despite knowledge of having a persistent or recurrent physical or psychological problem that is likely to have been caused or exacerbated by alcohol.  Met for Alcohol.  10.) Tolerance, as defined by either of the following: A need for markedly increased amounts of alcohol/drug to achieve intoxication or desired effect..  Met for Alcohol.  11.) Withdrawal, as manifested by either of the following: The characteristic withdrawal syndrome for alcohol/drug (refer to Criteria A and B of the criteria set for alcohol/drug withdrawal).. Met for Alcohol.         As evidenced by self report and criteria, client meets the following DSM5 Diagnoses:   (Sustained by DSM5 Criteria Listed Above)  Alcohol Use Disorder   303.90 (F10.20) Severe Current.     Risk Plan:  See Recommendations for Safety and Risk Management Plan    Recommendations:     1. Plan for Safety and Risk Management:  Recommended that patient call 911 or go to the local ED should there be a change in any of these risk factors..      Report to child / adult protection services was NA.     2. ALEXA Referrals:   Recommendations:      Consider entering a detox program(resources provided).  Abstain from the use of alcohol and all mood-altering drugs except those prescribed by a medical professional.  Participate in a co-occurring residential or [facility] with Lodging ALEXA Program such as those offered by [facility], [facility] or [facility].  The pt has requested a referral to [facility].  Continue to use RX as prescribed and discuss any concerns with your PCP.  Attend, weekly support group meeting such as 12 step based (AA/NA), [facility], [facility]  Realizations, and/or [facility] meetings.     Actively work with your mentor/sponsor on a weekly basis.   Follow all the recommendations of your treatment/medical providers.     Patient reports they are willing to follow these recommendations.  Patient would like the following family or other support people involved in their treatment:  TBD. Patient does not have a history of opiate use.    3. Mental Health Referrals:  Pt sees a therapist and has medication.  Pt will admit to a co-occurring ALEXA Program where his MH concerns can be addressed as well.     4. Clinical Substantiation/medical necessity for the above recommendations:  The pt meets criteria for alcohol use disorder-severe.  He has co-occurring MH DX and AUD as well as some family HX of AUD which places him at a high risk for relapse.  He has experienced both IP and OP in the past.  He has been drinking a pint of straight Vodka daily for the past few years now and has been unable to quit.  The pt has requested and meets criteria for [facility] and he will be in need of a strong aftercare plan.    5. Patient's identified  no special considerations needed for tx. .     6. Recommendations for treatment focus:     Depressed Mood - DX per pt.  Anxiety - DX per pt.  Alcohol / Substance Use - alcohol-severe .   Relapse prevention.  Relationship    7.  Interactive Complexity: No     DAANES: Assessment ID: [id number]      Provider Name/ Credentials:  ELIGIO Durbin  August 8, 2023

## 2023-08-08 NOTE — ADDENDUM NOTE
Encounter addended by: Hortencia Magana [facility] on: 8/8/2023 2:24 PM   Actions taken: Clinical Note Signed

## 2023-08-08 NOTE — ED TRIAGE NOTES
Was going to lodging [facility], told to stop here for detox. Alcohol detox, drinks about 1 pint/day. Has cut down recently in an attempt to detox himself. Seizure history about 2 months ago.    Last drink around 0800.

## 2023-08-08 NOTE — ED PROVIDER NOTES
[facility] EMERGENCY DEPARTMENT ([place])    8/08/23      ED PROVIDER NOTE    History     Chief Complaint   Patient presents with    Drug / Alcohol Assessment     HPI  Monty Cox is a 57 year old male with past medical history significant for alcohol use disorder, alcohol-induced pancreatitis, and PINEDA (2020) who presents to the emergency department for detox before transitioning into [facility]. Patient states that he called ahead and was instructed to come in for evaluation. He states that his last drink was this afternoon. He drinks around a pint of vodka a day. He reports history of withdrawal seizures. He states that the last withdrawal seizure he had was in detox two months ago. He denies any other drug use. He denies any suicidal ideation or intent to harm himself. He states that he is not currently feeling withdrawal symptoms.     Past Medical History  No past medical history on file.  No past surgical history on file.  ACCU-CHEK GUIDE test strip  allopurinol (ZYLOPRIM) 100 MG tablet  escitalopram (LEXAPRO) 5 MG tablet  gabapentin (NEURONTIN) 100 MG capsule  naltrexone (DEPADE/REVIA) 50 MG tablet  traZODone (DESYREL) 50 MG tablet  blood glucose (ACCU-CHEK GUIDE) test strip  blood glucose monitoring (ACCU-CHEK ERIKA PLUS) meter device kit  blood glucose monitoring (SOFTCLIX) lancets  blood glucose monitoring (SOFTCLIX) lancets  Blood Glucose Monitoring Suppl (ACCU-CHEK GUIDE) w/Device KIT  sodium bicarbonate 650 MG tablet      No Known Allergies  Family History  Family History   Problem Relation Age of Onset    Substance Abuse Father     Substance Abuse Nephew      Social History   Social History     Tobacco Use    Smoking status: Never    Smokeless tobacco: Never      Past medical history, past surgical history, medications, allergies, family history, and social history were reviewed with the patient. No additional pertinent items.      A medically appropriate review of systems was  performed with pertinent positives and negatives noted in the HPI, and all other systems negative.    Physical Exam   BP: 134/80  Pulse: 96  Temp: 99.5  F (37.5  C)  Resp: 16  SpO2: 100 %    Physical Exam  Vitals and nursing note reviewed.   Constitutional:       General: He is not in acute distress.     Appearance: He is well-developed.   HENT:      Head: Normocephalic.      Right Ear: External ear normal.      Left Ear: External ear normal.      Nose: Nose normal.   Eyes:      Extraocular Movements: Extraocular movements intact.      Conjunctiva/sclera: Conjunctivae normal.   Pulmonary:      Effort: Pulmonary effort is normal. No respiratory distress.   Abdominal:      General: Abdomen is flat. There is no distension.   Musculoskeletal:         General: No deformity. Normal range of motion.      Cervical back: Normal range of motion and neck supple.   Skin:     General: Skin is warm and dry.   Neurological:      Mental Status: He is alert. Mental status is at baseline.      Comments: Oriented   Psychiatric:         Mood and Affect: Mood normal.         Behavior: Behavior normal.           ED Course, Procedures, & Data      Procedures             No results found for any visits on 08/08/23.  Medications   diazepam (VALIUM) tablet 5-20 mg (has no administration in time range)   thiamine (B-1) tablet 100 mg (has no administration in time range)   folic acid (FOLVITE) tablet 1 mg (has no administration in time range)   multivitamin w/minerals (THERA-VIT-M) tablet 1 tablet (has no administration in time range)     Labs Ordered and Resulted from Time of ED Arrival to Time of ED Departure - No data to display  No orders to display            Assessment & Plan      Medical Decision Making  The patient's presentation is strongly suggestive of a chronic illness severe exacerbation, progression, or side effect of treatment.    The patient's evaluation involved:  review of external note(s) from 1 sources (Most recent ER  visit)  ordering and/or review of 2 test(s) in this encounter (Ethanol level as well as CMP)  review of 1 test result(s) ordered prior to this encounter (Previous ethanol levels)  discussion of management or test interpretation with another health professional (Clinical report was discussed with mental health intake)    The patient's management involved a decision regarding hospitalization.      Patient presents to us with a chief complaint of alcohol intoxication requesting detox.  Patient is not actively suicidal or homicidal.   Previous records were reviewed, labs and vital signs were also reviewed.  Patient is medically cleared and clinical report was given to mental health intake.  We do have a bed available on the detox unit.  Patient will be placed on the alcohol withdrawal protocol and treated with Valium.    I have reviewed the nursing notes. I have reviewed the findings, diagnosis, plan and need for follow up with the patient.    New Prescriptions    No medications on file       Final diagnoses:   Alcohol dependence with uncomplicated intoxication (H)     I, Suzanne Desai, am serving as a trained medical scribe to document services personally performed by Edgar Avila DO based on the provider's statements to me on August 8, 2023.  This document has been checked and approved by the attending provider.    I, Edgar Avila DO, was physically present and have reviewed and verified the accuracy of this note documented by Suzanne Desai, medical scribe.      Edgar Avila DO  [facility] EMERGENCY DEPARTMENT  8/8/2023     Edgar Avila DO  08/08/23 [id number]

## 2023-08-09 LAB
BASOPHILS # BLD AUTO: 0 10E3/UL (ref 0–0.2)
BASOPHILS NFR BLD AUTO: 0 %
CHOLEST SERPL-MCNC: 173 MG/DL
DEPRECATED HCO3 PLAS-SCNC: 19 MMOL/L (ref 22–29)
EOSINOPHIL # BLD AUTO: 0.1 10E3/UL (ref 0–0.7)
EOSINOPHIL NFR BLD AUTO: 1 %
ERYTHROCYTE [DISTWIDTH] IN BLOOD BY AUTOMATED COUNT: 18.6 % (ref 10–15)
GGT SERPL-CCNC: 81 U/L (ref 8–61)
HCT VFR BLD AUTO: 24.2 % (ref 40–53)
HDLC SERPL-MCNC: 21 MG/DL
HGB BLD-MCNC: 8.1 G/DL (ref 13.3–17.7)
IMM GRANULOCYTES # BLD: 0.3 10E3/UL
IMM GRANULOCYTES NFR BLD: 3 %
LDLC SERPL CALC-MCNC: 118 MG/DL
LYMPHOCYTES # BLD AUTO: 2.3 10E3/UL (ref 0.8–5.3)
LYMPHOCYTES NFR BLD AUTO: 18 %
MAGNESIUM SERPL-MCNC: 2.4 MG/DL (ref 1.7–2.3)
MCH RBC QN AUTO: 29.6 PG (ref 26.5–33)
MCHC RBC AUTO-ENTMCNC: 33.5 G/DL (ref 31.5–36.5)
MCV RBC AUTO: 88 FL (ref 78–100)
MONOCYTES # BLD AUTO: 1.3 10E3/UL (ref 0–1.3)
MONOCYTES NFR BLD AUTO: 10 %
NEUTROPHILS # BLD AUTO: 8.5 10E3/UL (ref 1.6–8.3)
NEUTROPHILS NFR BLD AUTO: 68 %
NONHDLC SERPL-MCNC: 152 MG/DL
NRBC # BLD AUTO: 0 10E3/UL
NRBC BLD AUTO-RTO: 0 /100
PLATELET # BLD AUTO: 433 10E3/UL (ref 150–450)
POTASSIUM SERPL-SCNC: 3.9 MMOL/L (ref 3.4–5.3)
RBC # BLD AUTO: 2.74 10E6/UL (ref 4.4–5.9)
SODIUM SERPL-SCNC: 131 MMOL/L (ref 136–145)
TRIGL SERPL-MCNC: 171 MG/DL
WBC # BLD AUTO: 12.6 10E3/UL (ref 4–11)

## 2023-08-09 PROCEDURE — 85025 COMPLETE CBC W/AUTO DIFF WBC: CPT | Performed by: PSYCHIATRY & NEUROLOGY

## 2023-08-09 PROCEDURE — 36415 COLL VENOUS BLD VENIPUNCTURE: CPT | Performed by: PSYCHIATRY & NEUROLOGY

## 2023-08-09 PROCEDURE — 82374 ASSAY BLOOD CARBON DIOXIDE: CPT | Performed by: PHYSICIAN ASSISTANT

## 2023-08-09 PROCEDURE — 99223 1ST HOSP IP/OBS HIGH 75: CPT | Mod: AI | Performed by: PSYCHIATRY & NEUROLOGY

## 2023-08-09 PROCEDURE — 84132 ASSAY OF SERUM POTASSIUM: CPT | Performed by: PHYSICIAN ASSISTANT

## 2023-08-09 PROCEDURE — 83735 ASSAY OF MAGNESIUM: CPT | Performed by: PHYSICIAN ASSISTANT

## 2023-08-09 PROCEDURE — 82977 ASSAY OF GGT: CPT | Performed by: PSYCHIATRY & NEUROLOGY

## 2023-08-09 PROCEDURE — 99222 1ST HOSP IP/OBS MODERATE 55: CPT | Performed by: PHYSICIAN ASSISTANT

## 2023-08-09 PROCEDURE — 84295 ASSAY OF SERUM SODIUM: CPT | Performed by: PHYSICIAN ASSISTANT

## 2023-08-09 PROCEDURE — 128N000004 HC R&B CD ADULT

## 2023-08-09 PROCEDURE — H2035 A/D TX PROGRAM, PER HOUR: HCPCS | Mod: HQ

## 2023-08-09 PROCEDURE — 250N000013 HC RX MED GY IP 250 OP 250 PS 637: Performed by: PSYCHIATRY & NEUROLOGY

## 2023-08-09 PROCEDURE — HZ2ZZZZ DETOXIFICATION SERVICES FOR SUBSTANCE ABUSE TREATMENT: ICD-10-PCS | Performed by: PSYCHIATRY & NEUROLOGY

## 2023-08-09 PROCEDURE — 80061 LIPID PANEL: CPT | Performed by: PSYCHIATRY & NEUROLOGY

## 2023-08-09 PROCEDURE — 250N000013 HC RX MED GY IP 250 OP 250 PS 637: Performed by: PHYSICIAN ASSISTANT

## 2023-08-09 RX ORDER — ALLOPURINOL 100 MG/1
50 TABLET ORAL DAILY
Status: DISCONTINUED | OUTPATIENT
Start: 2023-08-09 | End: 2023-08-15 | Stop reason: HOSPADM

## 2023-08-09 RX ADMIN — GABAPENTIN 100 MG: 100 CAPSULE ORAL at 14:15

## 2023-08-09 RX ADMIN — GABAPENTIN 100 MG: 100 CAPSULE ORAL at 20:34

## 2023-08-09 RX ADMIN — ESCITALOPRAM OXALATE 10 MG: 10 TABLET ORAL at 08:46

## 2023-08-09 RX ADMIN — FOLIC ACID 1 MG: 1 TABLET ORAL at 08:46

## 2023-08-09 RX ADMIN — TRAZODONE HYDROCHLORIDE 50 MG: 50 TABLET ORAL at 22:17

## 2023-08-09 RX ADMIN — HYDROXYZINE HYDROCHLORIDE 25 MG: 25 TABLET, FILM COATED ORAL at 22:17

## 2023-08-09 RX ADMIN — Medication 50 MG: at 09:58

## 2023-08-09 RX ADMIN — DIAZEPAM 10 MG: 5 TABLET ORAL at 04:00

## 2023-08-09 RX ADMIN — GABAPENTIN 100 MG: 100 CAPSULE ORAL at 08:46

## 2023-08-09 RX ADMIN — ATORVASTATIN CALCIUM 20 MG: 20 TABLET, FILM COATED ORAL at 08:46

## 2023-08-09 ASSESSMENT — ACTIVITIES OF DAILY LIVING (ADL)
ADLS_ACUITY_SCORE: 30
HYGIENE/GROOMING: INDEPENDENT
DRESS: INDEPENDENT
ADLS_ACUITY_SCORE: 30
LAUNDRY: UNABLE TO COMPLETE
ORAL_HYGIENE: INDEPENDENT
ADLS_ACUITY_SCORE: 30
DRESS: INDEPENDENT
ADLS_ACUITY_SCORE: 30
ORAL_HYGIENE: INDEPENDENT
ADLS_ACUITY_SCORE: 30
HYGIENE/GROOMING: INDEPENDENT
ADLS_ACUITY_SCORE: 30
LAUNDRY: UNABLE TO COMPLETE

## 2023-08-09 NOTE — H&P
"[facility] [place]   Psychiatric History and Physical  Admission date: 8/8/2023        Chief Complaint:   \"Alcohol\"         HPI:     The patient is a 56yo male with a history of alcohol use disorder and depression who was admitted to detoxify from alcohol. He reports that he is \"doing okay.\" Was able to get \"a little sleep\" last night. Denies any nausea. Mood is depressed but denies any SI. Does have a history of withdrawal seizures with the most recent just a few months ago. Has a remote history of DTs but denies any current AH or VH. Was on Naltrexone but didn't find it helpful. Was on Campral but stopped \"due to my kidneys.\" Open to considering Antabuse. Open to CD treatment. Non smoker.      Per ER:  Monty Cox is a 57 year old male with past medical history significant for alcohol use disorder, alcohol-induced pancreatitis, and PINEDA (2020) who presents to the emergency department for detox before transitioning into [facility]. Patient states that he called ahead and was instructed to come in for evaluation. He states that his last drink was this afternoon. He drinks around a pint of vodka a day. He reports history of withdrawal seizures. He states that the last withdrawal seizure he had was in detox two months ago. He denies any other drug use. He denies any suicidal ideation or intent to harm himself. He states that he is not currently feeling withdrawal symptoms.          Past Psychiatric History:     MDD. On Lexapro. Denies any history of suicide attempts.         Substance Use and History:     Alcohol use disorder. Does have a history of withdrawal seizures, most recent was 2 months ago. Has been on Naltrexone and Campral in the past.         Past Medical History:   PAST MEDICAL HISTORY: No past medical history on file.    PAST SURGICAL HISTORY: No past surgical history on file.          Family History:   FAMILY HISTORY:   Family History   Problem Relation Age of Onset "    Substance Abuse Father     Substance Abuse Nephew            Social History:   Please see the full psychosocial profile from the clinical treatment coordinator.   SOCIAL HISTORY:   Social History     Tobacco Use    Smoking status: Never    Smokeless tobacco: Never   Substance Use Topics    Alcohol use: Not on file            Physical ROS:   The patient endorsed fatigue. The remainder of 10-point review of systems was negative except as noted in HPI.         PTA Medications:     Medications Prior to Admission   Medication Sig Dispense Refill Last Dose    allopurinol (ZYLOPRIM) 100 MG tablet Take 100 mg by mouth daily   8/8/2023    atorvastatin (LIPITOR) 20 MG tablet Take 20 mg by mouth daily   8/7/2023 at AM    blood glucose (ACCU-CHEK GUIDE) test strip Test 2 times per day. Pharmacy allowed to substitute per patient's insurance.   More than a month    blood glucose monitoring (SOFTCLIX) lancets Test 2 times per day. Call your doctor if blood glucose is > 300.   More than a month    Blood Glucose Monitoring Suppl (ACCU-CHEK GUIDE) w/Device KIT    More than a month    escitalopram (LEXAPRO) 10 MG tablet Take 1 tablet by mouth daily   8/8/2023 at AM    folic acid (FOLVITE) 1 MG tablet Take 1 mg by mouth daily   8/7/2023 at PM    gabapentin (NEURONTIN) 100 MG capsule Take 1 capsule (100 mg) by mouth 3 times daily 90 capsule 0 8/8/2023 at AM    naltrexone (DEPADE/REVIA) 50 MG tablet Take 1 tablet (50 mg) by mouth daily 30 tablet 0 More than a month    traZODone (DESYREL) 50 MG tablet Take 50 mg by mouth nightly as needed   8/7/2023 at PM          Allergies:   No Known Allergies       Labs:     Recent Results (from the past 48 hour(s))   Comprehensive metabolic panel    Collection Time: 08/08/23  4:52 PM   Result Value Ref Range    Sodium 131 (L) 136 - 145 mmol/L    Potassium 3.3 (L) 3.4 - 5.3 mmol/L    Chloride 91 (L) 98 - 107 mmol/L    Carbon Dioxide (CO2) 19 (L) 22 - 29 mmol/L    Anion Gap 21 (H) 7 - 15 mmol/L     Urea Nitrogen 58.9 (H) 6.0 - 20.0 mg/dL    Creatinine 1.66 (H) 0.67 - 1.17 mg/dL    Calcium 9.3 8.6 - 10.0 mg/dL    Glucose 103 (H) 70 - 99 mg/dL    Alkaline Phosphatase 117 40 - 129 U/L    AST 56 (H) 0 - 45 U/L    ALT 26 0 - 70 U/L    Protein Total 8.5 (H) 6.4 - 8.3 g/dL    Albumin 4.0 3.5 - 5.2 g/dL    Bilirubin Total 0.4 <=1.2 mg/dL    GFR Estimate 48 (L) >60 mL/min/1.73m2   Ethyl Alcohol Level    Collection Time: 08/08/23  4:52 PM   Result Value Ref Range    Alcohol ethyl <0.01 <=0.01 g/dL   CBC with platelets and differential    Collection Time: 08/08/23  4:52 PM   Result Value Ref Range    WBC Count 12.5 (H) 4.0 - 11.0 10e3/uL    RBC Count 2.80 (L) 4.40 - 5.90 10e6/uL    Hemoglobin 8.4 (L) 13.3 - 17.7 g/dL    Hematocrit 24.6 (L) 40.0 - 53.0 %    MCV 88 78 - 100 fL    MCH 30.0 26.5 - 33.0 pg    MCHC 34.1 31.5 - 36.5 g/dL    RDW 18.4 (H) 10.0 - 15.0 %    Platelet Count 427 150 - 450 10e3/uL    % Neutrophils 80 %    % Lymphocytes 10 %    % Monocytes 8 %    % Eosinophils 0 %    % Basophils 0 %    % Immature Granulocytes 2 %    NRBCs per 100 WBC 0 <1 /100    Absolute Neutrophils 9.9 (H) 1.6 - 8.3 10e3/uL    Absolute Lymphocytes 1.3 0.8 - 5.3 10e3/uL    Absolute Monocytes 1.0 0.0 - 1.3 10e3/uL    Absolute Eosinophils 0.1 0.0 - 0.7 10e3/uL    Absolute Basophils 0.0 0.0 - 0.2 10e3/uL    Absolute Immature Granulocytes 0.3 <=0.4 10e3/uL    Absolute NRBCs 0.0 10e3/uL   Drug abuse screen 1 urine (ED)    Collection Time: 08/08/23  4:52 PM   Result Value Ref Range    Amphetamines Urine Screen Negative Screen Negative    Barbituates Urine Screen Negative Screen Negative    Benzodiazepine Urine Screen Negative Screen Negative    Cannabinoids Urine Screen Negative Screen Negative    Cocaine Urine Screen Negative Screen Negative    Opiates Urine Screen Negative Screen Negative   Alcohol breath test POCT    Collection Time: 08/08/23  4:58 PM   Result Value Ref Range    Alcohol Breath Test 0.000 0.00 - 0.01   GGT    Collection  "Time: 08/09/23  6:41 AM   Result Value Ref Range    GGT 81 (H) 8 - 61 U/L   Lipid panel    Collection Time: 08/09/23  6:41 AM   Result Value Ref Range    Cholesterol 173 <200 mg/dL    Triglycerides 171 (H) <150 mg/dL    Direct Measure HDL 21 (L) >=40 mg/dL    LDL Cholesterol Calculated 118 (H) <=100 mg/dL    Non HDL Cholesterol 152 (H) <130 mg/dL   CBC with platelets and differential    Collection Time: 08/09/23  6:41 AM   Result Value Ref Range    WBC Count 12.6 (H) 4.0 - 11.0 10e3/uL    RBC Count 2.74 (L) 4.40 - 5.90 10e6/uL    Hemoglobin 8.1 (L) 13.3 - 17.7 g/dL    Hematocrit 24.2 (L) 40.0 - 53.0 %    MCV 88 78 - 100 fL    MCH 29.6 26.5 - 33.0 pg    MCHC 33.5 31.5 - 36.5 g/dL    RDW 18.6 (H) 10.0 - 15.0 %    Platelet Count 433 150 - 450 10e3/uL    % Neutrophils 68 %    % Lymphocytes 18 %    % Monocytes 10 %    % Eosinophils 1 %    % Basophils 0 %    % Immature Granulocytes 3 %    NRBCs per 100 WBC 0 <1 /100    Absolute Neutrophils 8.5 (H) 1.6 - 8.3 10e3/uL    Absolute Lymphocytes 2.3 0.8 - 5.3 10e3/uL    Absolute Monocytes 1.3 0.0 - 1.3 10e3/uL    Absolute Eosinophils 0.1 0.0 - 0.7 10e3/uL    Absolute Basophils 0.0 0.0 - 0.2 10e3/uL    Absolute Immature Granulocytes 0.3 <=0.4 10e3/uL    Absolute NRBCs 0.0 10e3/uL          Physical and Psychiatric Examination:     /81   Pulse 91   Temp 97.7  F (36.5  C) (Temporal)   Resp 16   Ht 1.803 m (5' 11\")   Wt 75.8 kg (167 lb)   SpO2 92%   BMI 23.29 kg/m    Weight is 167 lbs 0 oz  Body mass index is 23.29 kg/m .    Physical Exam:  I have reviewed the physical exam as documented by the medical team and agree with findings and assessment and have no additional findings to add at this time.    Mental Status Exam:  Appearance: awake, alert and adequately groomed  Attitude:  cooperative  Eye Contact:  good  Mood:  depressed  Affect:  mood congruent  Speech:  clear, coherent  Language: fluent and intact in English  Psychomotor, Gait, Musculoskeletal:  no evidence of " tardive dyskinesia, dystonia, or tics  Thought Process:  goal oriented  Associations:  no loose associations  Thought Content:  no evidence of suicidal ideation or homicidal ideation and no evidence of psychotic thought  Insight:  fair  Judgement:  intact  Oriented to:  time, person, and place  Attention Span and Concentration:  intact  Recent and Remote Memory:  fair  Fund of Knowledge:  appropriate         Admission Diagnoses:     Alcohol use disorder, severe  Alcohol withdrawal with unspecified complication   MDD, recurrent, moderate         Assessment & Plan:     1) MSSA with Valium.   2) Patient to be provided information on Antabuse.  3) Continue Lexapro.   4) Patient open to CD treatment. Likely to [facility].     Disposition Plan   Reason for ongoing admission: requires detoxification from substance that poses a risk of bodily harm during withdrawal period  Discharge location: Chemical dependency treatment facility  Discharge Medications: not ordered  Follow-up Appointments: not scheduled  Legal Status: voluntary    Entered by: Mark Austin MD on 8/9/2023 at 8:38 AM

## 2023-08-09 NOTE — PLAN OF CARE
Problem: Alcohol Withdrawal  Goal: Alcohol Withdrawal Symptom Control  Outcome: Progressing   Goal Outcome Evaluation:         Pt is in alcohol detox. On MSSA with prn valium.  Pt denies withdrawal symptoms, no pain or discomfort.   Pt sleeping fine after trazodone.    MSSA 5 and 8, 10mg valium given    B/P: 99/55, T: 99.2, P: 97, R: 18   B/P: 103/60, T: 97.7, P: 89, R: 18

## 2023-08-09 NOTE — PROGRESS NOTES
"SPIRITUAL HEALTH SERVICES Progress Note  [facility] ([facility]) 3AW    Saw pt Monty Cox per admission request.    Patient/Family Understanding of Illness and Goals of Care - Monty shared he's been struggling to gain traction in his sobriety from drinking for many years. He is planning to discharge to [facility] for treatment.    Distress and Loss - He identified \"loneliness,\" \"feeling like a burden,\" and not wanting to ask for help\" as barriers to his sobriety.     Strengths, Coping, and Resources - He enjoys art and journaling, and identified that \"forcing\" himself to do things he used to enjoy might benefit his sobriety. We discussed attitudes of self-compassion and gentleness in the midst of doing his part. He has the support of his wife/family and longs for stronger relationships with friends.    Meaning, Beliefs, and Spirituality - Monty brought books on Adventism and AA to the hospital to read. He was raised surrounded by [place], and has come to realize his spirituality and higher power aren't easily defined. What brings his life meaning: making others happy, working hard, and helping others. Monty processed how he's realizing he needs to begin to put himself first and focus on his own happiness, and we discussed his value and the gifts he has.     Plan of Care - No planned follow up. After our visit, Monty found me on the unit and said \"Thanks again, I feel so much better after we talked.\"  remains available per pt request.     Elaine Penaloza, [facility]  Chaplain Resident  Pager [phone]      * [facility] remains available 24/7 for emergent requests/referrals, either by having the switchboard page the on-call  or by entering an ASAP/STAT consult in Epic (this will also page the on-call ). Routine Epic consults receive an initial response within 24 hours.*    "

## 2023-08-09 NOTE — PLAN OF CARE
Nursing Assessment    Recent Vitals: B/P: 119/78, T: 97.3, P: 83, R: 16     Sleep:  Hours of sleep at night: 6.5    Goal No goal    General Shift Summary  Patient has been in and out of the milieu, social with peers, and makes needs known to staff. He presents with a bright affect. Patient voiced having anxiety 5/10, denied depression and AVH. Insight and judgement seem fair. Hygiene is good.    Patient is medication cooperative. He scored a 7 on his 0800 MSSA and a 4 on his 1200 MSSA. Patient will be out of detox by 0400 on 8/10/23.    Plan is to discharge to [facility] once out of detox.    Nadja Fink RN MSN

## 2023-08-09 NOTE — PROGRESS NOTES
"3A Admission Note    S = Situation:   Monty Cox is a 57 year old year old male with a chief complaint of Drug / Alcohol Assessment    Voluntary admission to [facility] for alcohol withdrawal and detox. Admitted from the [place] ED.    B  = Background:   Pt states he was accepted at [facility] but was told to come in for detox first. Pt states he was in treatment at [facility] about a month and a half ago. He spent a month there, it did not work out for him, he relapsed right after he came out. Started to drink approximately a pint of vodka daily since.   Pt endorses withdrawal seizures, last seizure was 2 months ago. Hx of DTs  Pt denies use of any other chemicals, does not smoke. Utox negative.   Pt received valium 10mg x2 in ED. Potassium 3.3, replaced, Na 131.       A  =  Assessment:   During admission interview, pt affect was pleasant and cooperative, denied SI, endorsed anxiety which reports is baseline.   MSSA 6 not requiring any valium. Took prn trazodone for sleep.   /78   Pulse 92   Temp 99.5  F (37.5  C)   Resp 16   Ht 1.803 m (5' 11\")   Wt 75.8 kg (167 lb)   SpO2 100%   BMI 23.29 kg/m       R =   Request or Recommendation:   Alcohol withdrawal will be monitored and treated using MSSA with valium prn.  Pt will meet with psychiatry, internal medicine, and case management tomorrow.  At the time of admission, pt reports discharge plan is [facility] after detox. Already secured a bed.   Has [facility] for insurance.   "

## 2023-08-09 NOTE — PLAN OF CARE
Behavioral Team Discussion: (8/9/2023)    Continued Stay Criteria/Rationale: Patient admitted for  Alcohol Use Disorder.  Plan: The following services will be provided to the patient; psychiatric assessment, medication management, therapeutic milieu, individual and group support, and skills groups.   Participants: 3A Provider: Dr. Mark Austin MD; 3A RN: Sandy Eugene, RN; 3A CM's: Carlos Galvin.  Summary/Recommendation: Providers will assess today for treatment recommendations, discharge planning, and aftercare plans. CM will meet with pt for discharge planning.   Medical/Physical: Patient reports a history of pancreatitis, and AK.   Precautions:   Behavioral Orders   Procedures    Code 1 - Restrict to Unit    Routine Programming     As clinically indicated    Seizure precautions    Status 15     Every 15 minutes.    Withdrawal precautions     Rationale for change in precautions or plan: N/A  Progress: Initial.    ASAM Dimension Scale Ratings:  Dimension 1: 3 Client tolerates and adriana with withdrawal discomfort poorly. Client has severe intoxication, such that the client endangers self or others, or intoxication has not abated with less intensive levels of services. Client displays severe signs and symptoms; or risk of severe, but manageable withdrawal; or withdrawal worsening despite detox at less intensive level.  Dimension 2: 1 Client tolerates and adriana with physical discomfort and is able to get the services that the client needs.  Dimension 3: 2 Client has difficulty with impulse control and lacks coping skills. Client has thoughts of suicide or harm to others without means; however, the thoughts may interfere with participation in some treatment activities. Client has difficulty functioning in significant life areas. Client has moderate symptoms of emotional, behavioral, or cognitive problems. Client is able to participate in most treatment activities.  Dimension 4: 3 Client displays inconsistent compliance,  minimal awareness of either the client's addiction or mental disorder, and is minimally cooperative.  Dimension 5: 3 Client has poor recognition and understanding of relapse and recidivism issues and displays moderately high vulnerability for further substance use or mental health problems. Client has few coping skills and rarely applies coping skills.  Dimension 6: 3 Client is not engaged in structured, meaningful activity and the client's peers, family, significant other, and living environment are unsupportive, or there is significant criminal justice system involvement.

## 2023-08-09 NOTE — PROGRESS NOTES
08/08/23 6857   Patient Belongings   Did you bring any home meds/supplements to the hospital?  Yes   Disposition of meds  Sent to security/pharmacy per site process   Patient Belongings other (see comments)   Belongings Search Yes   Clothing Search Yes   Second Staff Mendel Block     Storage bin: bag of toiletries, black backpack with spiral  notebooks and  cords, canvas backpack with colored pencils and spiral notebook, extra clothing, notebook with string, Ziploc with set of keys and watch; shoes    Storage  room:  red suitcase with extra clothing and art supplies     Box in med room: phone,  no wallet    Security env # [id number], [id number], [id number]  meds    ADMISSION:    I am responsible for any personal items that are not sent to the safe or pharmacy. [facility] is not responsible for loss, theft or damage of any property in my possession.    Patient Signature _________________________________________ Date/Time _____________________    Staff Signature ___________________________________________ Date/Time _____________________    2nd Staff person, if patient is unable/unwilling to sign    ________________________________________________________ Date/Time _____________________    DISCHARGE:    All personal items have been returned to me.    Patient Signature _________________________________________ Date/Time _____________________    Staff Signature ___________________________________________ Date/Time _____________________

## 2023-08-09 NOTE — CONSULTS
[facility]  Consult Note - Hospitalist Service  Date of Admission:  8/8/2023  Consult Requested by: Byron Dennis MD   Reason for Consult: routine medical co-management for detox    Assessment & Plan   Monty Cox is a 57 year old male admitted on 8/8/2023 to [facility] for alcohol detox. He has a history of depression, anxiety, dyslipidemia, CKD stage III.     Alcohol dependence and withdrawal  Does report history of withdrawal seizures, last about 2 months ago. Does report distant history of DTs.  Consumes a pint of vodka at baseline daily.  - Management per psychiatry:    - MSSA Valium protocol   - agree with vitamin supplementation including of thiamine and folate   - PRN hydroxyzine, trazodone, Maalox, Tylenol   - counseling, group therapies, community resources    Depression  Anxiety  -Management per psychiatry    Gout  Patient reports a flare that started last week.  At baseline he is on allopurinol.  He has a flare currently to his right second toe and his right thumb. (See below for photo).  He states that the joints are not particularly painful at this time.  He states that previously for flares providers would prescribe him steroids.    - He declines any acute interventions at this time.  - Continue allopurinol 50 mg daily    Leukocytosis with left shift  To 12.6. No recent fevers, chills, cough, abd pain, vomiting, diarrhea. May be related to gout or acute physiologic stress from alcohol / withdrawal.     Hypokalemia  - replaced and resolved    Mild hyponatremia  Does seem recurrent at baseline, may be related to hypovolemia.     Right shin pain  Patient feels that he has shinsplints.  He states that he feels this is a result of overcompensating due to his right toe gout.  Pain is to the lower aspect of his right shin.  - As needed Tylenol 650 mg every 4 hours  - Ice therapy as needed    Dyslipidemia  - continue PTA atorvastatin  - recommend  management by PCP    CKD stage III  Does have history of requiring dialysis for 2.5 years. Renal disease is now recovered to CKD stage III level.     Anemia of chronic disease, normocytic  Within a variable baseline ~7.5-12.  Patient denies any evidence of recent bleeding from his stool, urine, or other sites.    Prediabetes A1c 5.7% in March. - rec follow up with PCP, recommend lifestyle management    History of essential hypertension  History of alcohol induced pancreatitis  History of gout, polyarticular    Medicine service will sign off. Thank you for involving us in the care of this patient. Please consult or contact us with any new medical questions or concerns.     The patient's care was discussed with patient. Plan of care communicated to primary team via this note.       Clinically Significant Risk Factors Present on Admission        # Hypokalemia: Lowest K = 3.3 mmol/L in last 2 days, will replace as needed      # Anion Gap Metabolic Acidosis: Highest Anion Gap = 21 mmol/L in last 2 days, will monitor and treat as appropriate                    Yanni Orantes PA-C  Hospitalist Service  Securely message with [employer] (more info)  Text page via [facility] Paging/Directory   ______________________________________________________________________    Chief Complaint   anxious    History is obtained from the patient    History of Present Illness   Monty Cox is a 57 year old male who is being seen at the detox unit for medical consultation.  The patient reports feeling anxiety and sweatiness, otherwise no acute complaints.  He denies any recent fevers, chills, cough, chest pain, shortness of breath, abdominal pain, nausea, vomiting, changes to his urine or stool.  He denies any bleeding from his stool, urine, or any other sites of his body.    Past Medical History    See above.       Medications   Medications Prior to Admission   Medication Sig Dispense Refill Last Dose    allopurinol (ZYLOPRIM) 100 MG tablet Take  100 mg by mouth daily   8/8/2023    atorvastatin (LIPITOR) 20 MG tablet Take 20 mg by mouth daily   8/7/2023 at AM    blood glucose (ACCU-CHEK GUIDE) test strip Test 2 times per day. Pharmacy allowed to substitute per patient's insurance.   More than a month    blood glucose monitoring (SOFTCLIX) lancets Test 2 times per day. Call your doctor if blood glucose is > 300.   More than a month    Blood Glucose Monitoring Suppl (ACCU-CHEK GUIDE) w/Device KIT    More than a month    escitalopram (LEXAPRO) 10 MG tablet Take 1 tablet by mouth daily   8/8/2023 at AM    folic acid (FOLVITE) 1 MG tablet Take 1 mg by mouth daily   8/7/2023 at PM    gabapentin (NEURONTIN) 100 MG capsule Take 1 capsule (100 mg) by mouth 3 times daily 90 capsule 0 8/8/2023 at AM    naltrexone (DEPADE/REVIA) 50 MG tablet Take 1 tablet (50 mg) by mouth daily 30 tablet 0 More than a month    traZODone (DESYREL) 50 MG tablet Take 50 mg by mouth nightly as needed   8/7/2023 at PM          Review of Systems    The 10 point Review of Systems is negative other than noted in the HPI.     Physical Exam   Vital Signs: Temp: 97.7  F (36.5  C) Temp src: Temporal BP: 117/81 Pulse: 91   Resp: 16 SpO2: 92 % O2 Device: None (Room air)    Weight: 167 lbs 0 oz    GENERAL: adult male sitting comfortably in chair, also seen ambulating. NAD.   NEURO / PSYCH: No focal deficits. Moves all extremities.   HEENT: Anicteric sclera. PERRL. Mucous membranes moist.   CV: RRR. S1, S2. No murmurs appreciated. 2+ right radial pulse.   RESPIRATORY: Effort normal. Lungs CTAB with no wheezing, rales, rhonchi.   GI: Abdomen soft and non distended with bowel sounds rare. No TTP or guarding.   MSK: no gross deformities, appropriate gait. Right thumb and right 2nd toe are swollen, somewhat fluctuant, not exquisitely tender  EXTREMITIES: nonedematous  SKIN: No jaundice. No rashes or lesions to exposed areas.           Medical Decision Making       60 MINUTES SPENT BY ME on the date of  service doing chart review, history, exam, documentation & further activities per the note.      Data   Recent Labs   Lab 08/09/23  0641 08/08/23  1652   WBC 12.6* 12.5*   HGB 8.1* 8.4*   MCV 88 88    427   * 131*   POTASSIUM 3.9 3.3*   CHLORIDE  --  91*   CO2 19* 19*   BUN  --  58.9*   CR  --  1.66*   ANIONGAP  --  21*   JERALD  --  9.3   GLC  --  103*   ALBUMIN  --  4.0   PROTTOTAL  --  8.5*   BILITOTAL  --  0.4   ALKPHOS  --  117   ALT  --  26   AST  --  56*

## 2023-08-09 NOTE — PROGRESS NOTES
Triage & Transition Services, [address]     Monty Cox  August 9, 2023    Insurance: [employer]    Legal Status: Volentary     SUDs Assessment Status: not yet completed      ROIs on file: None     Living Situation: Pt lives in [facility] in a Duplex by himself     Current Providers and Supports:  Limited support     Encounter: Pt was met by the writer during breakfest. Pt was very pleasant and open and honest. Pt is set up with [facility] to attend treatment upon release from detox.      Collateral: None     Consulted with Treatment team patient s plan of care.          Current Plan: Complete detox and transfer to [facility]    RN updated.    ELIGIO TRINH  Triage & Transition Services - Mental Health and Addiction Service Line  [address] - Adult Inpatient Addiction Psychiatry Unit

## 2023-08-09 NOTE — DISCHARGE INSTRUCTIONS
Behavioral Discharge Planning and Instructions  THANK YOU FOR CHOOSING [address]  [phone]    Summary: You were admitted to [facility] on 08/08 for detoxification from alcohol.  A medical exam was performed that included lab work. You have met with a  and opted to attend residential treatment.  Please take care and make your recovery a daily priority, Monty!  It was a pleasure working with you and the entire treatment team here wishes you the very best in your recovery!     Recommendation:  Residential treatment.    Main Diagnoses:  Per Dr. Norman MD;  Alcohol use disorder, severe  Alcohol withdrawal with unspecified complication   MDD, recurrent, moderate    Major Treatments, Procedures and Findings:  You were treated for Alcohol detoxification using Valium. You had labs drawn and those results were reviewed with you. Please take a copy of your lab work with you to your next primary care provider appointment.    Symptoms to Report:  If you experience more anxiety, confusion, sleeplessness, deep sadness or thoughts of suicide, notify your treatment team or notify your primary care provider. IF ANY OF THE SYMPTOMS YOU ARE EXPERIENCING ARE A MEDICAL EMERGENCY CALL 911 IMMEDIATELY.     Lifestyle Adjustment: Adjust your lifestyle to get enough sleep, relaxation, exercise and good nutrition. Continue to develop healthy coping skills to decrease stress and promote a sober living environment. Do not use mood altering substances including alcohol, illegal drugs or addictive medications other than what is currently prescribed.     Disposition: Discharging to [facility] inpatient treatment.    Facts about COVID19 at www.cdc.gov/COVID19 and www.[place].gov/covid19    Keeping hands clean is one of the most important steps we can take to avoid getting sick and spreading germs to others.  Please wash your hands frequently and lather with soap for at least 20 seconds!    Follow-up Appointment:    Appointment Date/Time: September 12 th 2023 @ 10.00 AM  Primary Care Giver:     Jeramie Giles [facility]  [address] [facility] [address]  Phone Number: [phone]        Therapy with Holly Jones qTuesday    Admit to [facility] on Tuesday 08/15 at 1:00pm.  [address], [place] [address]   [phone]      Recovery apps for your phone to locate current in person and zoom recovery meetings  Pink Wilbarger - meeting bobby  AA  - meeting bobby  Meeting guide - meeting bobby  Quick NA meeting - meeting bobby  Cristinanicolejadon- has various apps    Resources:  Some AA/NA meetings are being held online however most have returned to in-person or a hybrid combination please check online to verify*  Need Support Now? If you or someone you know is struggling or in crisis, help is available. Call or text OpenCounter2 or chat [employer].org  AA meetings search for them at: https://aa-intergroup.org (worldwide meeting listings)  AA meetings for [place] area can be found online at: https://a[place].org (click local online meetings listings)  NA meetings for [place] area can be found online at: https://www.nam[place].org  (click find a meeting)  AA and NA Sponsors are excellent resources for support and you can find one at any support group meeting.   Alcoholics Anonymous (https://aa.org/): for information 24 hours/day  AA Intergroup service office in [place] (http://www.aastpaul.org/) [phone]  AA Intergroup service office in [facility]: [phone]. (http://www.a[place].org/)  Narcotics Anonymous (www.nam[place].org) (139) 866-4828  https://aafairviewriverside.org/meetings  SMART Recovery - self management for addiction recovery:  www.smartrecovery.org  Pathways ~ A Health Crisis Resource & Support Center:  414.611.1040.  https://prescribetoprevent.org/patient-education/videos/  http://www.harmreduction.org  Located within [facility] [phone]  Support Group:  AA/NA and  Sponsor/support.  National [place] on Mental Illness (www.[place].antonia.org): 202.612.2129 or 837-010-3591.  Alcoholics Anonymous (www.alcoholics-anonymous.org): Check your phone book for your local chapter.  Suicide Awareness Voices of Education (SAVE) (www.save.org): 884-130-MFCH (5883)  National Suicide Prevention Line (www.mentalhealthmn.org): 988-107-BSBL (1977)  Mental Health Consumer/Survivor Network of [place] (www.mhcsn.net): 369.939.8318 or 360-811-3729  Mental Health Association of [place] (www.mentalhealth.org): 954.161.8887 or 215-681-6098   Substance Abuse and Mental Health Services (www.samhsa.gov)  [place] Opioid Prevention Coalition: www.opioidcoalition.org    [place] Recovery Connection (MRC)  [facility] connects people seeking recovery to resources that help foster and sustain long-term recovery.  Whether you are seeking resources for treatment, transportation, housing, job training, education, health care or other pathways to recovery, [facility] is a great place to start.  206.666.3331.  www.[place] Devicesy.org    Great Pod casts for nutrition and wellness  Listen on Apple Podcasts  [employer]   Nutrition       Understand the connection between what you eat and how you feel. Hosted by licensed nutritionists and dietitians from [employer] we share practical, real-life solutions for healthier living through nutrition.     General Medication Instructions:   See your medication sheet(s) for instructions.   Take all medications as prescribed.  Make no changes unless your primary care provider suggests them.   Go to all your primary care provider visits.  Be sure to have all your required lab tests. This way, your medicines can be refilled on time.  Do not use any forms of alcohol.    Please Note:  If you have any questions at anytime after you are discharged please call [facility] detox unit 3[facility] at [phone].  SOURAV Zamora, Behavioral Intake  [phone]  Medical Records call [phone]  Outpatient Behavioral Intake call [phone]  LP+ Wait List/Bed Availability call [phone]    Please remember to take all of your behavioral discharge planning and lab paperwork to any follow up appointments, it contains your lab results, diagnosis, medication list and discharge recommendations.      THANK YOU FOR CHOOSING [facility]

## 2023-08-10 PROCEDURE — 99232 SBSQ HOSP IP/OBS MODERATE 35: CPT | Performed by: PSYCHIATRY & NEUROLOGY

## 2023-08-10 PROCEDURE — 250N000013 HC RX MED GY IP 250 OP 250 PS 637: Performed by: PHYSICIAN ASSISTANT

## 2023-08-10 PROCEDURE — 250N000013 HC RX MED GY IP 250 OP 250 PS 637: Performed by: PSYCHIATRY & NEUROLOGY

## 2023-08-10 PROCEDURE — 128N000004 HC R&B CD ADULT

## 2023-08-10 RX ORDER — TRAZODONE HYDROCHLORIDE 50 MG/1
50 TABLET, FILM COATED ORAL
Qty: 30 TABLET | Refills: 1 | Status: SHIPPED | OUTPATIENT
Start: 2023-08-10 | End: 2023-08-11

## 2023-08-10 RX ORDER — DISULFIRAM 250 MG/1
250 TABLET ORAL DAILY
Qty: 30 TABLET | Refills: 3 | Status: SHIPPED | OUTPATIENT
Start: 2023-08-10 | End: 2024-06-25

## 2023-08-10 RX ORDER — ALLOPURINOL 100 MG/1
50 TABLET ORAL DAILY
Qty: 15 TABLET | Refills: 0 | Status: SHIPPED | OUTPATIENT
Start: 2023-08-10 | End: 2024-06-25

## 2023-08-10 RX ORDER — ESCITALOPRAM OXALATE 10 MG/1
10 TABLET ORAL DAILY
Qty: 30 TABLET | Refills: 1 | Status: ON HOLD | OUTPATIENT
Start: 2023-08-10 | End: 2024-06-28

## 2023-08-10 RX ORDER — ATORVASTATIN CALCIUM 20 MG/1
20 TABLET, FILM COATED ORAL DAILY
Qty: 30 TABLET | Refills: 0 | Status: ON HOLD | OUTPATIENT
Start: 2023-08-10 | End: 2024-06-28

## 2023-08-10 RX ORDER — FOLIC ACID 1 MG/1
1 TABLET ORAL DAILY
Qty: 30 TABLET | Refills: 1 | Status: SHIPPED | OUTPATIENT
Start: 2023-08-10 | End: 2024-06-25

## 2023-08-10 RX ORDER — GABAPENTIN 100 MG/1
100 CAPSULE ORAL 3 TIMES DAILY
Qty: 90 CAPSULE | Refills: 1 | Status: ON HOLD | OUTPATIENT
Start: 2023-08-10 | End: 2024-06-28

## 2023-08-10 RX ADMIN — HYDROXYZINE HYDROCHLORIDE 25 MG: 25 TABLET, FILM COATED ORAL at 20:53

## 2023-08-10 RX ADMIN — FOLIC ACID 1 MG: 1 TABLET ORAL at 08:55

## 2023-08-10 RX ADMIN — GABAPENTIN 100 MG: 100 CAPSULE ORAL at 20:53

## 2023-08-10 RX ADMIN — GABAPENTIN 100 MG: 100 CAPSULE ORAL at 14:14

## 2023-08-10 RX ADMIN — TRAZODONE HYDROCHLORIDE 50 MG: 50 TABLET ORAL at 20:53

## 2023-08-10 RX ADMIN — HYDROXYZINE HYDROCHLORIDE 25 MG: 25 TABLET, FILM COATED ORAL at 16:32

## 2023-08-10 RX ADMIN — GABAPENTIN 100 MG: 100 CAPSULE ORAL at 08:55

## 2023-08-10 RX ADMIN — Medication 50 MG: at 08:55

## 2023-08-10 RX ADMIN — ATORVASTATIN CALCIUM 20 MG: 20 TABLET, FILM COATED ORAL at 08:55

## 2023-08-10 RX ADMIN — ESCITALOPRAM OXALATE 10 MG: 10 TABLET ORAL at 08:55

## 2023-08-10 ASSESSMENT — ACTIVITIES OF DAILY LIVING (ADL)
ADLS_ACUITY_SCORE: 30
ORAL_HYGIENE: INDEPENDENT
ADLS_ACUITY_SCORE: 30
ADLS_ACUITY_SCORE: 30
LAUNDRY: UNABLE TO COMPLETE
LAUNDRY: WITH SUPERVISION
ADLS_ACUITY_SCORE: 30
ORAL_HYGIENE: INDEPENDENT
ADLS_ACUITY_SCORE: 30
ADLS_ACUITY_SCORE: 30
DRESS: STREET CLOTHES
HYGIENE/GROOMING: INDEPENDENT
ADLS_ACUITY_SCORE: 30
HYGIENE/GROOMING: INDEPENDENT
ADLS_ACUITY_SCORE: 30
DRESS: INDEPENDENT

## 2023-08-10 NOTE — PLAN OF CARE
Goal Outcome Evaluation:    Plan of Care Reviewed With: patient        Patient pleasant and cooperative, visible in milieu.  Flat affect, denies SI/SIB, endorsed depression/anxiety.  Patient out of detox from alcohol.  Attended group meetings, patient also, paces hallway briefly before going to his room.  Good appetite, medication compliant.  Patient looking forward to discharge to treatment center when available.  Resting comfortably in room, will continue to monitor closely.

## 2023-08-10 NOTE — PROGRESS NOTES
Pt was not able to get to [facility] due to financial barriers. However he has made contact with [facility] and they will be accepting him on Monday. They would also like him to stay here in detox as he is homeless and they would like him to go door to door.

## 2023-08-10 NOTE — PROGRESS NOTES
Patient has not required any valium for alcohol detox since 8/9 @ 0400. All MSSA scores since that time have been less than 8. Pt is now removed from alcohol detox monitoring status.

## 2023-08-10 NOTE — PROGRESS NOTES
"[facility], [place]   Psychiatric Progress Note        Interim History:   The patient's care was discussed with the treatment team during the daily team meeting and/or staff's chart notes were reviewed.  Staff report patient is out of detox. Unable to go to [facility] due to insurance issues.     The patient reports that he is \"all right.\" Feeling better today. Sleeping and eating better. Denies any SI. Is open to considering Antabuse and would like it prescribed. Open to CD treatment. Currently homeless and would prefer [id number]-xz-[id number] admission.          Medications:      allopurinol  50 mg Oral Daily    atorvastatin  20 mg Oral Daily    escitalopram  10 mg Oral Daily    folic acid  1 mg Oral Daily    gabapentin  100 mg Oral TID          Allergies:   No Known Allergies       Labs:   No results found for this or any previous visit (from the past 24 hour(s)).       Psychiatric Examination:     /78 (BP Location: Left arm, Patient Position: Sitting, Cuff Size: Adult Regular)   Pulse 92   Temp 97.8  F (36.6  C) (Temporal)   Resp 17   Ht 1.803 m (5' 11\")   Wt 75.8 kg (167 lb)   SpO2 100%   BMI 23.29 kg/m    Weight is 167 lbs 0 oz  Body mass index is 23.29 kg/m .  Orthostatic Vitals       None              Appearance: awake, alert and adequately groomed  Attitude:  cooperative  Eye Contact:  good  Mood:  better  Affect:  mood congruent  Speech:  clear, coherent  Psychomotor Behavior:  no evidence of tardive dyskinesia, dystonia, or tics  Thought Process:  goal oriented  Associations:  no loose associations  Thought Content:  no evidence of suicidal ideation or homicidal ideation and no evidence of psychotic thought  Insight:  fair  Judgement:  intact  Oriented to:  time, person, and place  Attention Span and Concentration:  intact  Recent and Remote Memory:  fair         Precautions:     Behavioral Orders   Procedures    Code 1 - Restrict to Unit    Routine Programming     As " clinically indicated    Seizure precautions    Status 15     Every 15 minutes.    Withdrawal precautions          Diagnoses:     Alcohol use disorder, severe  Alcohol withdrawal with unspecified complication   MDD, recurrent, moderate    Clinically Significant Risk Factors     # Hypokalemia: Lowest K = 3.3 mmol/L in last 2 days, will replace as needed      # Anion Gap Metabolic Acidosis: Highest Anion Gap = 21 mmol/L in last 2 days, will monitor and treat as appropriate                                          Plan:     1) Patient detoxified with MSSA protocol.   2) Continue Lexapro and PRN gabapentin.   3) Patient to be provided Antabuse at discharge.   4) Patient open to CD treatment.       Disposition Plan   Reason for ongoing admission: requires detoxification from substance that poses a risk of bodily harm during withdrawal period  Discharge location: Chemical dependency treatment facility  Discharge Medications: ordered.  Follow-up Appointments: not scheduled  Legal Status: voluntary    Entered by: Mark Austin MD on August 10, 2023 at 2:30 PM

## 2023-08-10 NOTE — PROGRESS NOTES
08/09/23 2232   Group Therapy Session   Group Attendance attended group session   Time Session Began 1600   Time Session Ended 1740   Total Time (minutes) 50   Total # Attendees 8   Group Type addiction;psychotherapeutic   Group Topic Covered disease of addiction/choices in recovery;relapse prevention   Group Session Detail Process,coping with uncertainty, difficulty with emotions   Patient Response/Contribution cooperative with task;discussed personal experience with topic;listened actively   Patient Participation Detail feeling better today, hopeful abut [facility], says it is helpful when his wife is attending [facility]. He stopped writer after group to ask about Art/ and art therapy. 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 He spoke abut how his employer was supportive and how he will go to treatment and return to work.

## 2023-08-10 NOTE — PLAN OF CARE
Problem: Alcohol Withdrawal  Goal: Alcohol Withdrawal Symptom Control  Outcome: Met  Goal: Optimal Neurologic Function  Outcome: Met      Problem: Behavioral Health Plan of Care  Goal: Optimal Comfort and Wellbeing  Outcome: Ongoing, Progressing    Behavioral  Pt appeared sleeping comfortably overnight; no behavioral concerns noted;     Medical  Pt out of detox from alcohol.   No new medical concerns noted.

## 2023-08-10 NOTE — PLAN OF CARE
"  Problem: Alcohol Withdrawal  Goal: Alcohol Withdrawal Symptom Control  Outcome: Progressing        Patient is pleasant and cooperative upon approach;  Ate 100% of his dinner hydrating with encouragement.  Pt denied SI,SIB, HI, and hallucinations; mood is anxious and labile; affect flat and blunted; pt exhibits appropriate engagement with staff and peers in the milieu. Patient is waiting for a [facility] to [facility]. Staff will continue to monitor and assist as needed.  MSSA was 4&4  /81 (BP Location: Left arm)   Pulse 80   Temp 98  F (36.7  C) (Oral)   Resp 16   Ht 1.803 m (5' 11\")   Wt 75.8 kg (167 lb)   SpO2 95%   BMI 23.29 kg/m     "

## 2023-08-10 NOTE — PROGRESS NOTES
Triage & Transition Services, [address]     Monty Cox  August 10, 2023    Insurance: [employer]     Legal Status: Volentary      SUDs Assessment Status: not yet completed       ROIs on file: None      Living Situation: Pt lives in [facility] in a Duplex by himself      Current Providers and Supports:  Limited support      Encounter: Pt was met by the writer during breakfest. Pt was very pleasant and open and honest. Pt is not set up with [facility], as he was told he was accepted but today we were informed he would need to pay for the housing money out of pocket. Pt is in process to complete an intake and would like to attend treatment with [facility].       Collateral: None      Consulted with Treatment team patient s plan of care.            Current Plan: Pt is completing intake with [facility] today at noon and will wait for approval and acceptance to the program    RN updated.    ELIGIO TRINH  Triage & Transition Services - Mental Health and Addiction Service Line  [address] - Adult Inpatient Addiction Psychiatry Unit

## 2023-08-11 PROCEDURE — 250N000013 HC RX MED GY IP 250 OP 250 PS 637: Performed by: PSYCHIATRY & NEUROLOGY

## 2023-08-11 PROCEDURE — 250N000013 HC RX MED GY IP 250 OP 250 PS 637: Performed by: PHYSICIAN ASSISTANT

## 2023-08-11 PROCEDURE — 128N000004 HC R&B CD ADULT

## 2023-08-11 PROCEDURE — 99232 SBSQ HOSP IP/OBS MODERATE 35: CPT | Performed by: PSYCHIATRY & NEUROLOGY

## 2023-08-11 RX ORDER — TRAZODONE HYDROCHLORIDE 100 MG/1
100 TABLET ORAL
Qty: 30 TABLET | Refills: 1 | Status: ON HOLD | OUTPATIENT
Start: 2023-08-11 | End: 2024-06-28

## 2023-08-11 RX ORDER — TRAZODONE HYDROCHLORIDE 100 MG/1
100 TABLET ORAL
Status: DISCONTINUED | OUTPATIENT
Start: 2023-08-11 | End: 2023-08-15 | Stop reason: HOSPADM

## 2023-08-11 RX ADMIN — GABAPENTIN 100 MG: 100 CAPSULE ORAL at 14:11

## 2023-08-11 RX ADMIN — ESCITALOPRAM OXALATE 10 MG: 10 TABLET ORAL at 08:19

## 2023-08-11 RX ADMIN — GABAPENTIN 100 MG: 100 CAPSULE ORAL at 21:42

## 2023-08-11 RX ADMIN — GABAPENTIN 100 MG: 100 CAPSULE ORAL at 08:19

## 2023-08-11 RX ADMIN — TRAZODONE HYDROCHLORIDE 100 MG: 100 TABLET ORAL at 21:42

## 2023-08-11 RX ADMIN — HYDROXYZINE HYDROCHLORIDE 25 MG: 25 TABLET, FILM COATED ORAL at 21:42

## 2023-08-11 RX ADMIN — ATORVASTATIN CALCIUM 20 MG: 20 TABLET, FILM COATED ORAL at 08:18

## 2023-08-11 RX ADMIN — FOLIC ACID 1 MG: 1 TABLET ORAL at 08:19

## 2023-08-11 RX ADMIN — Medication 50 MG: at 08:18

## 2023-08-11 ASSESSMENT — ACTIVITIES OF DAILY LIVING (ADL)
ADLS_ACUITY_SCORE: 30
ADLS_ACUITY_SCORE: 30
DRESS: STREET CLOTHES
ADLS_ACUITY_SCORE: 30
ORAL_HYGIENE: INDEPENDENT
ADLS_ACUITY_SCORE: 30
DRESS: INDEPENDENT
ORAL_HYGIENE: INDEPENDENT
HYGIENE/GROOMING: INDEPENDENT
LAUNDRY: WITH SUPERVISION
ADLS_ACUITY_SCORE: 30
LAUNDRY: UNABLE TO COMPLETE
ADLS_ACUITY_SCORE: 30
ADLS_ACUITY_SCORE: 30
HYGIENE/GROOMING: INDEPENDENT
ADLS_ACUITY_SCORE: 30
ADLS_ACUITY_SCORE: 30

## 2023-08-11 NOTE — PLAN OF CARE
Problem: Sleep Disturbance  Goal: Adequate Sleep/Rest  Outcome: Progressing       Behavioral  Pt appeared sleeping comfortably overnight; no behavioral concerns noted;     Medical  Pt out of detox from alcohol.   No new medical concerns noted.

## 2023-08-11 NOTE — PROGRESS NOTES
Triage & Transition Services, [address]     Monty Cox  August 11, 2023    Insurance: [employer]     Legal Status: Volentary      SUDs Assessment Status: not yet completed       ROIs on file: None      Living Situation: Pt lives in [facility] in a Duplex by himself      Current Providers and Supports:    None identified     Encounter: Met with Patient on several occasions for discharge planning. He is scheduled to admit to [facility] on Tuesday 08/15. He expressed happiness with this and will be coordinating transportation with a friend or family member to get him to treatment.     Collateral: Had several calls with [facility] to coordinate care.    Current Plan: Can admit to [facility] on Tuesday August 15th at 1pm. Will contact [facility] (Jacquelyn [phone]) on Monday to see if they have had a cancellation so Patient can admit on Monday.    RN updated.    Corinne Romitti, [facility]  Triage & Transition Services - Mental Health and Addiction Service Line  [address] - Adult Inpatient Addiction Psychiatry Unit

## 2023-08-11 NOTE — PLAN OF CARE
Problem: Alcohol Withdrawal  Goal: Readiness for Change Identified  Outcome: Progressing   Patient came to writer complaining of anxiety related to a change in plan to his discharge to [facility]. He rated anxiety and depression 7/10 but denies SI/SIB/HI and all forms of hallucinations chevy to be safe. He is now looking at the option of going to [facility] but is not sure if his insurance will be accepted,Patient was given Hydroxyzine with some noted effect. Patient ate about 90% of his dinner with good fluid intake. He was compliant with his meds and unit rules. Staff will continue to monitor and assist as needed.

## 2023-08-11 NOTE — PROGRESS NOTES
Triage & Transition Services, [address]     Received call from Anel (Jacquelyn [phone]) and confirmed patient is cleared for admission for Tuesday, August 15th at 1:00pm. Patient is also on a waitlist if there is a cancellation patient might be able to get into [facility] earlier. Case Management will call and confirm for updates Monday.     Dorothea Sosa  Triage & Transition Services - Mental Health and Addiction Service Line  [address] - Adult Inpatient Addiction Psychiatry Unit   No

## 2023-08-11 NOTE — PROGRESS NOTES
"[facility], [place]   Psychiatric Progress Note        Interim History:   The patient's care was discussed with the treatment team during the daily team meeting and/or staff's chart notes were reviewed.  Staff report patient is out of detox. Can likely go to [facility] on Monday. Is homeless.     The patient reports that he is \"okay.\" Mood is good. Denies any SI. Eating well. Did not sleep well. Had difficulty staying asleep. Reports that Trazodone has typically been helpful but has started to be less effective. Agreeable to try an increase. Agreeable to be here for the weekend and enter CD treatment on Monday.          Medications:      allopurinol  50 mg Oral Daily    atorvastatin  20 mg Oral Daily    escitalopram  10 mg Oral Daily    folic acid  1 mg Oral Daily    gabapentin  100 mg Oral TID          Allergies:   No Known Allergies       Labs:   No results found for this or any previous visit (from the past 24 hour(s)).       Psychiatric Examination:     /73 (BP Location: Left arm, Patient Position: Sitting, Cuff Size: Adult Regular)   Pulse 75   Temp 97.2  F (36.2  C) (Temporal)   Resp 16   Ht 1.803 m (5' 11\")   Wt 75.8 kg (167 lb)   SpO2 100%   BMI 23.29 kg/m    Weight is 167 lbs 0 oz  Body mass index is 23.29 kg/m .  Orthostatic Vitals       None              Appearance: awake, alert and adequately groomed  Attitude:  cooperative  Eye Contact:  good  Mood:  good  Affect:  mood congruent  Speech:  clear, coherent  Psychomotor Behavior:  no evidence of tardive dyskinesia, dystonia, or tics  Thought Process:  goal oriented  Associations:  no loose associations  Thought Content:  no evidence of suicidal ideation or homicidal ideation and no evidence of psychotic thought  Insight:  fair  Judgement:  intact  Oriented to:  time, person, and place  Attention Span and Concentration:  intact  Recent and Remote Memory:  fair         Precautions:     Behavioral Orders   Procedures "    Code 1 - Restrict to Unit    Routine Programming     As clinically indicated    Seizure precautions    Status 15     Every 15 minutes.    Withdrawal precautions          Diagnoses:     Alcohol use disorder, severe  Alcohol withdrawal with unspecified complication   MDD, recurrent, moderate           Plan:     1) Patient detoxified with [facility]A protocol.   2) Increase Trazodone to 100mg at bedtime. Continue Lexapro and PRN gabapentin.   3) Patient to be provided Antabuse at discharge.   4) Patient open to CD treatment. Likely to [facility] on Monday.       Disposition Plan   Reason for ongoing admission: requires detoxification from substance that poses a risk of bodily harm during withdrawal period  Discharge location: Chemical dependency treatment facility  Discharge Medications: ordered.  Follow-up Appointments: not scheduled  Legal Status: voluntary    Entered by: Mark Austin MD on August 11, 2023 at 11:57 AM

## 2023-08-11 NOTE — PLAN OF CARE
Goal Outcome Evaluation:    Plan of Care Reviewed With: patient      Patient pleasant and cooperative, visible in milieu.  Flat affect, denies SI/SIB, depression/anxiety.  Out of detox from alcohol.  Attended group meetings and participated in activities.  Patient socialized and watched T.V with peers.  Good appetite, medication compliant.  Patient looking forward to discharge to treatment, [facility] on Tuesday.  Resting comfortably in room, will continue to monitor closely.

## 2023-08-12 PROCEDURE — 250N000013 HC RX MED GY IP 250 OP 250 PS 637: Performed by: PHYSICIAN ASSISTANT

## 2023-08-12 PROCEDURE — 250N000013 HC RX MED GY IP 250 OP 250 PS 637: Performed by: PSYCHIATRY & NEUROLOGY

## 2023-08-12 PROCEDURE — 128N000004 HC R&B CD ADULT

## 2023-08-12 RX ADMIN — HYDROXYZINE HYDROCHLORIDE 25 MG: 25 TABLET, FILM COATED ORAL at 16:38

## 2023-08-12 RX ADMIN — GABAPENTIN 100 MG: 100 CAPSULE ORAL at 14:31

## 2023-08-12 RX ADMIN — GABAPENTIN 100 MG: 100 CAPSULE ORAL at 21:03

## 2023-08-12 RX ADMIN — Medication 50 MG: at 08:56

## 2023-08-12 RX ADMIN — ESCITALOPRAM OXALATE 10 MG: 10 TABLET ORAL at 08:56

## 2023-08-12 RX ADMIN — GABAPENTIN 100 MG: 100 CAPSULE ORAL at 08:56

## 2023-08-12 RX ADMIN — ATORVASTATIN CALCIUM 20 MG: 20 TABLET, FILM COATED ORAL at 08:56

## 2023-08-12 RX ADMIN — FOLIC ACID 1 MG: 1 TABLET ORAL at 08:56

## 2023-08-12 RX ADMIN — TRAZODONE HYDROCHLORIDE 100 MG: 100 TABLET ORAL at 21:03

## 2023-08-12 RX ADMIN — HYDROXYZINE HYDROCHLORIDE 25 MG: 25 TABLET, FILM COATED ORAL at 21:03

## 2023-08-12 ASSESSMENT — ACTIVITIES OF DAILY LIVING (ADL)
ADLS_ACUITY_SCORE: 30
DRESS: SCRUBS (BEHAVIORAL HEALTH);INDEPENDENT
ADLS_ACUITY_SCORE: 30
HYGIENE/GROOMING: INDEPENDENT
ADLS_ACUITY_SCORE: 30
DRESS: SCRUBS (BEHAVIORAL HEALTH)
ADLS_ACUITY_SCORE: 30
ORAL_HYGIENE: INDEPENDENT
ADLS_ACUITY_SCORE: 30
LAUNDRY: UNABLE TO COMPLETE
ADLS_ACUITY_SCORE: 30
ADLS_ACUITY_SCORE: 30
LAUNDRY: WITH SUPERVISION
ORAL_HYGIENE: INDEPENDENT
HYGIENE/GROOMING: INDEPENDENT
ADLS_ACUITY_SCORE: 30

## 2023-08-12 NOTE — PLAN OF CARE
Goal Outcome Evaluation:    Plan of Care Reviewed With: patient          Patient had a good shift, visible in  milieu; out of detox from alcohol.  Flat affect, denies SI/SIB, depression/anxiety.  Patient attended group meetings.  Socialized and watched T.V with peers.  Good appetite, medication compliant, patient took nap after lunch.  Resting comfortably in room, will continue to monitor closely.

## 2023-08-12 NOTE — PLAN OF CARE
Problem: Adult Behavioral Health Plan of Care  Goal: Plan of Care Review  Outcome: Progressing  Flowsheets (Taken 8/11/2023 1700)  Patient Agreement with Plan of Care: agrees     Problem: Alcohol Withdrawal  Goal: Readiness for Change Identified  Outcome: Progressing   Goal Outcome Evaluation:    Plan of Care Reviewed With: patient        Pt resting in bed upon arrival. He is out of detox and he is looking forward to discharging on Tuesday to residential treatment center. He requested for and received Hydroxyzine and Trazodone for sleep. No concerns noted or verbalized. Will continue with same treatment plans.

## 2023-08-13 PROCEDURE — 250N000013 HC RX MED GY IP 250 OP 250 PS 637: Performed by: PSYCHIATRY & NEUROLOGY

## 2023-08-13 PROCEDURE — G0177 OPPS/PHP; TRAIN & EDUC SERV: HCPCS

## 2023-08-13 PROCEDURE — 128N000004 HC R&B CD ADULT

## 2023-08-13 PROCEDURE — 250N000013 HC RX MED GY IP 250 OP 250 PS 637: Performed by: PHYSICIAN ASSISTANT

## 2023-08-13 PROCEDURE — H2035 A/D TX PROGRAM, PER HOUR: HCPCS | Mod: HQ

## 2023-08-13 RX ADMIN — GABAPENTIN 100 MG: 100 CAPSULE ORAL at 07:55

## 2023-08-13 RX ADMIN — GABAPENTIN 100 MG: 100 CAPSULE ORAL at 14:14

## 2023-08-13 RX ADMIN — FOLIC ACID 1 MG: 1 TABLET ORAL at 07:54

## 2023-08-13 RX ADMIN — HYDROXYZINE HYDROCHLORIDE 25 MG: 25 TABLET, FILM COATED ORAL at 21:08

## 2023-08-13 RX ADMIN — GABAPENTIN 100 MG: 100 CAPSULE ORAL at 20:29

## 2023-08-13 RX ADMIN — ESCITALOPRAM OXALATE 10 MG: 10 TABLET ORAL at 07:54

## 2023-08-13 RX ADMIN — TRAZODONE HYDROCHLORIDE 100 MG: 100 TABLET ORAL at 21:08

## 2023-08-13 RX ADMIN — ATORVASTATIN CALCIUM 20 MG: 20 TABLET, FILM COATED ORAL at 07:54

## 2023-08-13 RX ADMIN — Medication 50 MG: at 07:54

## 2023-08-13 ASSESSMENT — ACTIVITIES OF DAILY LIVING (ADL)
ADLS_ACUITY_SCORE: 30
DRESS: INDEPENDENT
ADLS_ACUITY_SCORE: 30
HYGIENE/GROOMING: INDEPENDENT
HYGIENE/GROOMING: INDEPENDENT
ADLS_ACUITY_SCORE: 30
ADLS_ACUITY_SCORE: 30
DRESS: STREET CLOTHES
ADLS_ACUITY_SCORE: 30
ORAL_HYGIENE: INDEPENDENT
ADLS_ACUITY_SCORE: 30
ADLS_ACUITY_SCORE: 30
ORAL_HYGIENE: INDEPENDENT
ADLS_ACUITY_SCORE: 30
LAUNDRY: UNABLE TO COMPLETE
ADLS_ACUITY_SCORE: 30
ADLS_ACUITY_SCORE: 30

## 2023-08-13 NOTE — PLAN OF CARE
Goal Outcome Evaluation:    Plan of Care Reviewed With: patient        Patient pleasant and cooperative, visible in milieu.  Flat affect, denies SI/SIB, depression/anxiety.  Out of detox from alcohol; attended group meetings and participated in activities.  Good appetite, medication compliant.  Socialized and and watched T.V. with peers.  Resting comfortably in room; will continue to monitor closely.

## 2023-08-13 NOTE — CARE PLAN
Occupational Therapy Group Note:     08/13/23 1550   Group Therapy Session   Group Attendance attended group session   Time Session Began 1315   Time Session Ended 1415   Total Time (minutes) 60   Total # Attendees 7   Group Type recreation   Group Topic Covered leisure exploration/use of leisure time;structured socialization   Group Session Detail OT Leisure Group   Patient Response/Contribution confronted peers appropriately;cooperative with task;listened actively   Patient Participation Detail Focus of today's group was on healthy leisure exploration and engagement. Patient actively participated in a group game in order to: improve social skills and decrease isolative behaviors, exercise cognitive skills, improve concentration, act as a healthy distraction, and encourage new learning and retention. Patient verbalized engagement in reading and listening to music as ways in which he promotes cognitive wellness. Patient was able to identify benefits of participating in games to aid in recovery: cognitive stimulation. Patient listened actively to instructions of novel activity. Patient was able to independently engage in activity following instructions. Patient asked appropriate clarifying questions as needed. Patient was able to come up with multiple clever and unique responses to game prompts. Integrated instances of humor at times. Socially appropriate with peers within confines of game interactions. Overall, pleasant and engaged group participant.

## 2023-08-13 NOTE — PLAN OF CARE
Problem: Adult Behavioral Health Plan of Care  Goal: Plan of Care Review  Outcome: Progressing  Flowsheets (Taken 8/12/2023 1613)  Patient Agreement with Plan of Care: agrees   Goal Outcome Evaluation:    Plan of Care Reviewed With: patient        Pt up and in the lounge watching TV with peers upon arrival. Endorsed anxiety and rated at 7/10. Hydroxyzine administered with some relief. He remains pleasant calm, cooperative, and medication compliant. He requested for and received Trazodone and Hydroxyzine for sleep. No concerns noted or verbalized. Will continue to monitor.

## 2023-08-14 PROCEDURE — 250N000013 HC RX MED GY IP 250 OP 250 PS 637: Performed by: PSYCHIATRY & NEUROLOGY

## 2023-08-14 PROCEDURE — H2032 ACTIVITY THERAPY, PER 15 MIN: HCPCS

## 2023-08-14 PROCEDURE — 250N000013 HC RX MED GY IP 250 OP 250 PS 637: Performed by: PHYSICIAN ASSISTANT

## 2023-08-14 PROCEDURE — 128N000004 HC R&B CD ADULT

## 2023-08-14 RX ADMIN — ATORVASTATIN CALCIUM 20 MG: 20 TABLET, FILM COATED ORAL at 09:27

## 2023-08-14 RX ADMIN — ACETAMINOPHEN 650 MG: 325 TABLET, FILM COATED ORAL at 05:32

## 2023-08-14 RX ADMIN — GABAPENTIN 100 MG: 100 CAPSULE ORAL at 14:00

## 2023-08-14 RX ADMIN — Medication 50 MG: at 09:27

## 2023-08-14 RX ADMIN — HYDROXYZINE HYDROCHLORIDE 25 MG: 25 TABLET, FILM COATED ORAL at 20:47

## 2023-08-14 RX ADMIN — ESCITALOPRAM OXALATE 10 MG: 10 TABLET ORAL at 09:28

## 2023-08-14 RX ADMIN — GABAPENTIN 100 MG: 100 CAPSULE ORAL at 09:27

## 2023-08-14 RX ADMIN — TRAZODONE HYDROCHLORIDE 100 MG: 100 TABLET ORAL at 20:47

## 2023-08-14 RX ADMIN — FOLIC ACID 1 MG: 1 TABLET ORAL at 09:28

## 2023-08-14 RX ADMIN — GABAPENTIN 100 MG: 100 CAPSULE ORAL at 20:49

## 2023-08-14 ASSESSMENT — ACTIVITIES OF DAILY LIVING (ADL)
LAUNDRY: UNABLE TO COMPLETE
LAUNDRY: UNABLE TO COMPLETE
ADLS_ACUITY_SCORE: 30
ADLS_ACUITY_SCORE: 30
HYGIENE/GROOMING: INDEPENDENT
ADLS_ACUITY_SCORE: 30
ADLS_ACUITY_SCORE: 30
DRESS: INDEPENDENT
ADLS_ACUITY_SCORE: 30
DRESS: INDEPENDENT
ADLS_ACUITY_SCORE: 30
ADLS_ACUITY_SCORE: 30
ORAL_HYGIENE: INDEPENDENT
ADLS_ACUITY_SCORE: 30
ORAL_HYGIENE: INDEPENDENT
HYGIENE/GROOMING: INDEPENDENT

## 2023-08-14 NOTE — PLAN OF CARE
Goal Outcome Evaluation: Pt slept comfortably overnight.     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   No new medical concerns noted.

## 2023-08-14 NOTE — PROGRESS NOTES
08/13/23 2227   Group Therapy Session   Group Attendance attended group session   Time Session Began 1645   Time Session Ended 1735   Total Time (minutes) 50   Total # Attendees 8   Group Type addiction;psychotherapeutic   Group Topic Covered disease of addiction/choices in recovery;relapse prevention   Group Session Detail process   Patient Response/Contribution discussed personal experience with topic;listened actively;cooperative with task   Patient Participation Detail excited and anxious for discharge, spoke about feeling a little depressed and how wife kicked him out of house , that Joselyn was helping her with boundaries and he understands this even though he doesnt like it

## 2023-08-14 NOTE — PLAN OF CARE
Problem: Adult Behavioral Health Plan of Care  Goal: Plan of Care Review  Recent Flowsheet Documentation  Taken 8/14/2023 1040 by Elvira Koehler RN  Patient Agreement with Plan of Care: agrees   Goal Outcome Evaluation:    Plan of Care Reviewed With: patient          Pt calm and appropriate, engaging and socializing with peers on unit, denies pain, awaiting to discharge to [facility] tomorrow, no SI/HI, remains med compliant.  Requested Hydroxyzine for anxiety 7/10,  Trazodone given at HS, pt will be picked up by friend Buddy and transported to [facility] tomorrow, intake scheduled at 1 pm meds here.

## 2023-08-14 NOTE — PLAN OF CARE
Problem: Plan of Care - These are the overarching goals to be used throughout the patient stay.    Goal: Plan of Care Review    Outcome: Progressing   Goal Outcome Evaluation:    Plan of Care Reviewed With: patient        Patient has been awake and alert, participating in all programming and is social with staff and peers.  Pt denies symptoms of withdrawal.  He is eating well and drinking adequate fluids.  He has a full range affect, he is cooperative and calm.  Pt denies SI, SIB.  He anticipates discharge to [facility] on Tuesday.  Pt has been to [facility] before so he knows what to expect and has no questions.  Will continue to assist with discharge planning.

## 2023-08-14 NOTE — PLAN OF CARE
Problem: Plan of Care - These are the overarching goals to be used throughout the patient stay.    Goal: Optimal Comfort and Wellbeing  Outcome: Progressing   Goal Outcome Evaluation:      Pt alert and displaying a low mood this morning, states he did not sleep much last noc, kept waking up, continues out of detox, rates anxiety, and depression 6, has chronic Rt knee pain 6/10 , offered ice pack to site, has intake scheduled at [facility] for Tuesday at 1 pm, pt denies SI/HI,endorsing a good appetite,  VSS. no psych symptoms at this time.

## 2023-08-14 NOTE — PROGRESS NOTES
Triage & Transition Services, [address]     Monty SHI Kenny  August 14, 2023    Insurance: [employer]    Legal Status: Voluntary     SUDs Assessment Status: Completed by [facility].     ROIs on file: [facility]     Living Situation: Pt lives in [facility] in a Duplex by himself      Current Providers and Supports:  None identified.     Encounter: Met with Patient to discuss discharge plan and coordinate transportation. He reported he has talked to friends and family and no one is able to give him a ride. He has transportation benefits through his insurance and information was provided to him to have him set this up.    [place] that insurance denied his ride request. [facility] assisting in setting up transport with [employer].    Updated by [facility] that Patient reported he has now found a support to pick him up at 11am tomorrow to take him to [facility].    Collateral: [facility] for [facility] at 8am inquiring about bed availability today.  Received callback and they reported there is no availability today.  Inquired about if they can provide transportation and they report they only provide a shuttle to and from the airport.      Current Plan: Patient will discharge tomorrow 08/15 to [facility]. His admission time is 1pm. Has a support providing transportation.    RN updated.    Corinne Romitti, [facility]  Triage & Transition Services - Mental Health and Addiction Service Line  [facility] - Adult Inpatient Addiction Psychiatry Unit

## 2023-08-14 NOTE — PROGRESS NOTES
08/14/23 1800   Group Therapy Session   Group Attendance attended group session   Time Session Began 1645   Time Session Ended 1735   Total Time (minutes) 40   Total # Attendees 8   Group Type recreation   Group Topic Covered coping skills/lifestyle management   Group Session Detail healthy coping skills   Patient Response/Contribution cooperative with task   Patient Participation Detail Pt participated in Therapeutic Recreation group with focus on leisure education and acquisition of knowledge and skills. Pt was fully engaged and cooperative in group recreational intervention; leisure inventory. Pt was focused on the written portion for the first part of group and then contributed to the group discussion following. Pt discussed several recreational interests and positive coping skills that are healthy outlets. Pt mood was calm and was appropriate with interactions.

## 2023-08-15 VITALS
SYSTOLIC BLOOD PRESSURE: 106 MMHG | DIASTOLIC BLOOD PRESSURE: 68 MMHG | BODY MASS INDEX: 23.38 KG/M2 | WEIGHT: 167 LBS | HEIGHT: 71 IN | RESPIRATION RATE: 16 BRPM | OXYGEN SATURATION: 100 % | TEMPERATURE: 97.4 F | HEART RATE: 75 BPM

## 2023-08-15 PROCEDURE — 250N000013 HC RX MED GY IP 250 OP 250 PS 637: Performed by: PSYCHIATRY & NEUROLOGY

## 2023-08-15 PROCEDURE — 99239 HOSP IP/OBS DSCHRG MGMT >30: CPT | Performed by: PSYCHIATRY & NEUROLOGY

## 2023-08-15 PROCEDURE — 250N000013 HC RX MED GY IP 250 OP 250 PS 637: Performed by: PHYSICIAN ASSISTANT

## 2023-08-15 RX ADMIN — ATORVASTATIN CALCIUM 20 MG: 20 TABLET, FILM COATED ORAL at 08:06

## 2023-08-15 RX ADMIN — ESCITALOPRAM OXALATE 10 MG: 10 TABLET ORAL at 08:06

## 2023-08-15 RX ADMIN — Medication 50 MG: at 08:06

## 2023-08-15 RX ADMIN — GABAPENTIN 100 MG: 100 CAPSULE ORAL at 08:06

## 2023-08-15 RX ADMIN — FOLIC ACID 1 MG: 1 TABLET ORAL at 08:06

## 2023-08-15 RX ADMIN — ACETAMINOPHEN 650 MG: 325 TABLET, FILM COATED ORAL at 08:08

## 2023-08-15 ASSESSMENT — ACTIVITIES OF DAILY LIVING (ADL)
ADLS_ACUITY_SCORE: 30

## 2023-08-15 NOTE — PLAN OF CARE
"  Problem: Plan of Care - These are the overarching goals to be used throughout the patient stay.    Goal: Plan of Care Review  Description: The Plan of Care Review/Shift note should be completed every shift.  The Outcome Evaluation is a brief statement about your assessment that the patient is improving, declining, or no change.  This information will be displayed automatically on your shift note.  Outcome: Met  Goal: Patient-Specific Goal (Individualized)  Description: You can add care plan individualizations to a care plan. Examples of Individualization might be:  \"Parent requests to be called daily at 9am for status\", \"I have a hard time hearing out of my right ear\", or \"Do not touch me to wake me up as it startles me\".  Outcome: Met  Goal: Absence of Hospital-Acquired Illness or Injury  Intervention: Identify and Manage Fall Risk  Recent Flowsheet Documentation  Taken 8/15/2023 0823 by Elvira Koehler, RN  Safety Promotion/Fall Prevention: clutter free environment maintained  Intervention: Prevent Skin Injury  Recent Flowsheet Documentation  Taken 8/15/2023 0823 by Elvira Koehler, RN  Body Position: position changed independently  Goal: Optimal Comfort and Wellbeing  Intervention: Provide Person-Centered Care  Recent Flowsheet Documentation  Taken 8/15/2023 0823 by Elvira Koehler, RN  Trust Relationship/Rapport: care explained   Goal Outcome Evaluation:    Plan of Care Reviewed With: patient                   "

## 2023-08-15 NOTE — PLAN OF CARE
Problem: Adult Behavioral Health Plan of Care  Goal: Plan of Care Review  8/15/2023 0032 by Matt Sheppard RN  Outcome: Progressing   Goal Outcome Evaluation:    The patient has detoxed from alcohol, and he slept okay. The team conducted safety checks every 15 minutes with no related concerns.

## 2023-08-15 NOTE — DISCHARGE SUMMARY
"Psychiatric Discharge Summary    Monty Cox MRN# [id number]   Age: 57 year old YOB: 1965     Date of Admission:  8/8/2023  Date of Discharge:  8/15/2023 11:20 AM  Admitting Physician:  Mark Austin MD  Discharge Physician:  Mark Austin MD          Event Leading to Hospitalization:   The patient is a 58yo male with a history of alcohol use disorder and depression who was admitted to detoxify from alcohol. He reports that he is \"doing okay.\" Was able to get \"a little sleep\" last night. Denies any nausea. Mood is depressed but denies any SI. Does have a history of withdrawal seizures with the most recent just a few months ago. Has a remote history of DTs but denies any current AH or VH. Was on Naltrexone but didn't find it helpful. Was on Campral but stopped \"due to my kidneys.\" Open to considering Antabuse. Open to CD treatment. Non smoker.          See Admission note by Mark Austin MD for additional details.          Diagnoses:     Alcohol use disorder, severe  Alcohol withdrawal with unspecified complication   MDD, recurrent, moderate         Labs:        Lab Results   Component Value Date     08/09/2023    Lab Results   Component Value Date    CHLORIDE 91 08/08/2023    Lab Results   Component Value Date    BUN 58.9 08/08/2023      Lab Results   Component Value Date    POTASSIUM 3.9 08/09/2023    Lab Results   Component Value Date    CO2 19 08/09/2023    Lab Results   Component Value Date    CR 1.66 08/08/2023          Lab Results   Component Value Date    WBC 12.6 (H) 08/09/2023    HGB 8.1 (L) 08/09/2023    HCT 24.2 (L) 08/09/2023    MCV 88 08/09/2023     08/09/2023     Lab Results   Component Value Date    AST 56 (H) 08/08/2023    ALT 26 08/08/2023    GGT 81 (H) 08/09/2023    ALKPHOS 117 08/08/2023    BILITOTAL 0.4 08/08/2023     Lab Results   Component Value Date    TSH 1.65 03/17/2023            Consults:   Consultation during this admission received " from internal medicine:  Monty Cox is a 57 year old male admitted on 8/8/2023 to [facility] for alcohol detox. He has a history of depression, anxiety, dyslipidemia, CKD stage III.      Alcohol dependence and withdrawal  Does report history of withdrawal seizures, last about 2 months ago. Does report distant history of DTs.  Consumes a pint of vodka at baseline daily.  - Management per psychiatry:               - [facility] Valium protocol              - agree with vitamin supplementation including of thiamine and folate              - PRN hydroxyzine, trazodone, Maalox, Tylenol              - counseling, group therapies, community resources     Depression  Anxiety  -Management per psychiatry     Gout  Patient reports a flare that started last week.  At baseline he is on allopurinol.  He has a flare currently to his right second toe and his right thumb. (See below for photo).  He states that the joints are not particularly painful at this time.  He states that previously for flares providers would prescribe him steroids.    - He declines any acute interventions at this time.  - Continue allopurinol 50 mg daily     Leukocytosis with left shift  To 12.6. No recent fevers, chills, cough, abd pain, vomiting, diarrhea. May be related to gout or acute physiologic stress from alcohol / withdrawal.      Hypokalemia  - replaced and resolved     Mild hyponatremia  Does seem recurrent at baseline, may be related to hypovolemia.      Right shin pain  Patient feels that he has shinsplints.  He states that he feels this is a result of overcompensating due to his right toe gout.  Pain is to the lower aspect of his right shin.  - As needed Tylenol 650 mg every 4 hours  - Ice therapy as needed     Dyslipidemia  - continue PTA atorvastatin  - recommend management by PCP     CKD stage III  Does have history of requiring dialysis for 2.5 years. Renal disease is now recovered to CKD stage III level.      Anemia of chronic disease,  normocytic  Within a variable baseline ~7.5-12.  Patient denies any evidence of recent bleeding from his stool, urine, or other sites.     Prediabetes A1c 5.7% in March. - rec follow up with PCP, recommend lifestyle management     History of essential hypertension  History of alcohol induced pancreatitis  History of gout, polyarticular         Hospital Course:   Monty Cox was admitted to [facility] with attending Mark Austin MD as a voluntary patient. The patient was placed under status 15 (15 minute checks) to ensure patient safety.   MSSA protocol was initiated due to the patient's history of alcohol abuse and concern for withdrawal symptoms.  CBC, BMP and utox obtained.    All outpatient medications were continued with the exception of Naltrexone.   Antabuse was provided at discharge.     Monty Cox did participate in groups and was visible in the milieu.     The patient's symptoms of withdrawal improved.     Monty Cox was released to   treatment . At the time of discharge Monty Cox was determined to not be a danger to himself or others. At the current time of discharge, the patient does not meet criteria for involuntary hospitalization. On the day of discharge, the patient reports that they do not have suicidal or homicidal ideation and would never hurt themselves or others. Steps taken to minimize risk include: assessing patient s behavior and thought process daily during hospital stay, discharging patient with adequate plan for follow up for mental and physical health and discussing safety plan of returning to the hospital should the patient ever have thoughts of harming themselves or others. Therefore, based on all available evidence including the factors cited above, the patient does not appear to be at imminent risk for self-harm, and is appropriate for outpatient level of care.           Discharge Medications:     Current Discharge Medication List        START  taking these medications    Details   disulfiram (ANTABUSE) 250 MG tablet Take 1 tablet (250 mg) by mouth daily  Qty: 30 tablet, Refills: 3    Comments: Okay to use PRN and/or start when leaving treatment  Associated Diagnoses: Alcohol dependence with uncomplicated intoxication (H)           CONTINUE these medications which have CHANGED    Details   allopurinol (ZYLOPRIM) 100 MG tablet Take 0.5 tablets (50 mg) by mouth daily  Qty: 15 tablet, Refills: 0    Associated Diagnoses: Polyarticular gout      atorvastatin (LIPITOR) 20 MG tablet Take 1 tablet (20 mg) by mouth daily  Qty: 30 tablet, Refills: 0    Associated Diagnoses: Prediabetes      escitalopram (LEXAPRO) 10 MG tablet Take 1 tablet (10 mg) by mouth daily  Qty: 30 tablet, Refills: 1    Associated Diagnoses: Moderate episode of recurrent major depressive disorder (H)      folic acid (FOLVITE) 1 MG tablet Take 1 tablet (1 mg) by mouth daily  Qty: 30 tablet, Refills: 1    Associated Diagnoses: Alcohol use disorder, severe, dependence (H)      gabapentin (NEURONTIN) 100 MG capsule Take 1 capsule (100 mg) by mouth 3 times daily  Qty: 90 capsule, Refills: 1    Associated Diagnoses: Moderate episode of recurrent major depressive disorder (H)      traZODone (DESYREL) 100 MG tablet Take 1 tablet (100 mg) by mouth nightly as needed for sleep (may repeat after 60 minutes)  Qty: 30 tablet, Refills: 1    Associated Diagnoses: Moderate episode of recurrent major depressive disorder (H)           CONTINUE these medications which have NOT CHANGED    Details   blood glucose (ACCU-CHEK GUIDE) test strip Test 2 times per day. Pharmacy allowed to substitute per patient's insurance.      blood glucose monitoring (SOFTCLIX) lancets Test 2 times per day. Call your doctor if blood glucose is > 300.      Blood Glucose Monitoring Suppl (ACCU-CHEK GUIDE) w/Device KIT       naltrexone (DEPADE/REVIA) 50 MG tablet Take 1 tablet (50 mg) by mouth daily  Qty: 30 tablet, Refills: 0     Associated Diagnoses: Moderate episode of recurrent major depressive disorder (H)                  Psychiatric Examination:   Appearance:  awake, alert and adequately groomed  Attitude:  cooperative  Eye Contact:  good  Mood:  better  Affect:  mood congruent  Speech:  clear, coherent  Psychomotor Behavior:  no evidence of tardive dyskinesia, dystonia, or tics  Thought Process:  goal oriented  Associations:  no loose associations  Thought Content:  no evidence of suicidal ideation or homicidal ideation and no evidence of psychotic thought  Insight:  fair  Judgment:  intact  Oriented to:  time, person, and place  Attention Span and Concentration:  intact  Recent and Remote Memory:  fair  Language: Able to read and write  Fund of Knowledge: appropriate  Muscle Strength and Tone: normal  Gait and Station: Normal         Discharge Plan:   Continue medications as above.     Major Treatments, Procedures and Findings:  You were treated for Alcohol detoxification using Valium. You had labs drawn and those results were reviewed with you. Please take a copy of your lab work with you to your next primary care provider appointment.     Symptoms to Report:  If you experience more anxiety, confusion, sleeplessness, deep sadness or thoughts of suicide, notify your treatment team or notify your primary care provider. IF ANY OF THE SYMPTOMS YOU ARE EXPERIENCING ARE A MEDICAL EMERGENCY CALL 911 IMMEDIATELY.      Lifestyle Adjustment: Adjust your lifestyle to get enough sleep, relaxation, exercise and good nutrition. Continue to develop healthy coping skills to decrease stress and promote a sober living environment. Do not use mood altering substances including alcohol, illegal drugs or addictive medications other than what is currently prescribed.      Disposition: Discharging to [facility] inpatient treatment.     Facts about COVID19 at www.cdc.gov/COVID19 and www.[place].gov/covid19     Keeping hands clean is one of the most important  steps we can take to avoid getting sick and spreading germs to others.  Please wash your hands frequently and lather with soap for at least 20 seconds!     Follow-up Appointment:   Appointment Date/Time: September 12 th 2023 @ 10.00 AM  Primary Care Giver:      Jeramie Giles CNP    [facility]  [address] [address]  Phone Number: [phone]          [facility]     Admit to [facility] on Tuesday 08/15 at 1:00pm.  [address], [place] [address]   [phone]    Attestation:    The patient was seen and evaluated by me. I spent more than 30 minutes on discharge day activities. Mark Austin MD

## 2023-08-15 NOTE — PLAN OF CARE
Problem: Plan of Care - These are the overarching goals to be used throughout the patient stay.    Goal: Optimal Comfort and Wellbeing  Intervention: Provide Person-Centered Care  Recent Flowsheet Documentation  Taken 8/15/2023 0823 by Elvira Koehler RN  Trust Relationship/Rapport: care explained   Goal Outcome Evaluation:    Plan of Care Reviewed With: patient        Pt alert and social with peers,  SI/HI, med compliant and looking forward to discharging to [facility] with friend Buddy picking up at 11am, discharge meds and Instructions reviewed with patient, c/o general discomfort, Tylenol administered with effect, vitals stable, denies all psych symptoms, pt has PCP appointments and psych appointments set up for after care. Belongings returned to patient.

## 2023-08-25 ENCOUNTER — HOSPITAL ENCOUNTER (EMERGENCY)
Facility: CLINIC | Age: 58
Discharge: HOME | End: 2023-08-25
Attending: EMERGENCY MEDICINE | Admitting: EMERGENCY MEDICINE
Payer: COMMERCIAL

## 2023-08-25 VITALS
RESPIRATION RATE: 18 BRPM | TEMPERATURE: 97.5 F | BODY MASS INDEX: 23.08 KG/M2 | HEART RATE: 75 BPM | WEIGHT: 170.4 LBS | HEIGHT: 72 IN | DIASTOLIC BLOOD PRESSURE: 96 MMHG | SYSTOLIC BLOOD PRESSURE: 154 MMHG | OXYGEN SATURATION: 99 %

## 2023-08-25 DIAGNOSIS — D63.1 ANEMIA DUE TO STAGE 3B CHRONIC KIDNEY DISEASE (H): ICD-10-CM

## 2023-08-25 DIAGNOSIS — N18.32 ANEMIA DUE TO STAGE 3B CHRONIC KIDNEY DISEASE (H): ICD-10-CM

## 2023-08-25 LAB
ABO/RH(D): NORMAL
ALBUMIN SERPL BCG-MCNC: 3.9 G/DL (ref 3.5–5.2)
ALP SERPL-CCNC: 84 U/L (ref 40–129)
ALT SERPL W P-5'-P-CCNC: 13 U/L (ref 0–70)
ANION GAP SERPL CALCULATED.3IONS-SCNC: 12 MMOL/L (ref 7–15)
ANTIBODY SCREEN: NEGATIVE
AST SERPL W P-5'-P-CCNC: 27 U/L (ref 0–45)
BASOPHILS # BLD AUTO: 0 10E3/UL (ref 0–0.2)
BASOPHILS NFR BLD AUTO: 1 %
BILIRUB SERPL-MCNC: 0.2 MG/DL
BUN SERPL-MCNC: 33.9 MG/DL (ref 6–20)
CALCIUM SERPL-MCNC: 9.5 MG/DL (ref 8.6–10)
CHLORIDE SERPL-SCNC: 107 MMOL/L (ref 98–107)
CREAT SERPL-MCNC: 1.82 MG/DL (ref 0.67–1.17)
DEPRECATED HCO3 PLAS-SCNC: 21 MMOL/L (ref 22–29)
EOSINOPHIL # BLD AUTO: 0.2 10E3/UL (ref 0–0.7)
EOSINOPHIL NFR BLD AUTO: 3 %
ERYTHROCYTE [DISTWIDTH] IN BLOOD BY AUTOMATED COUNT: 18.6 % (ref 10–15)
FERRITIN SERPL-MCNC: 249 NG/ML (ref 31–409)
GFR SERPL CREATININE-BSD FRML MDRD: 43 ML/MIN/1.73M2
GLUCOSE SERPL-MCNC: 96 MG/DL (ref 70–99)
HCT VFR BLD AUTO: 23.8 % (ref 40–53)
HGB BLD-MCNC: 7.5 G/DL (ref 13.3–17.7)
HOLD SPECIMEN: NORMAL
HOLD SPECIMEN: NORMAL
IMM GRANULOCYTES # BLD: 0 10E3/UL
IMM GRANULOCYTES NFR BLD: 0 %
IRON BINDING CAPACITY (ROCHE): 289 UG/DL (ref 240–430)
IRON SATN MFR SERPL: 6 % (ref 15–46)
IRON SERPL-MCNC: 16 UG/DL (ref 61–157)
LYMPHOCYTES # BLD AUTO: 1.6 10E3/UL (ref 0.8–5.3)
LYMPHOCYTES NFR BLD AUTO: 29 %
MCH RBC QN AUTO: 30.1 PG (ref 26.5–33)
MCHC RBC AUTO-ENTMCNC: 31.5 G/DL (ref 31.5–36.5)
MCV RBC AUTO: 96 FL (ref 78–100)
MONOCYTES # BLD AUTO: 0.8 10E3/UL (ref 0–1.3)
MONOCYTES NFR BLD AUTO: 14 %
NEUTROPHILS # BLD AUTO: 2.9 10E3/UL (ref 1.6–8.3)
NEUTROPHILS NFR BLD AUTO: 53 %
NRBC # BLD AUTO: 0 10E3/UL
NRBC BLD AUTO-RTO: 0 /100
PLATELET # BLD AUTO: 315 10E3/UL (ref 150–450)
POTASSIUM SERPL-SCNC: 5 MMOL/L (ref 3.4–5.3)
PROT SERPL-MCNC: 8.2 G/DL (ref 6.4–8.3)
RBC # BLD AUTO: 2.49 10E6/UL (ref 4.4–5.9)
SODIUM SERPL-SCNC: 140 MMOL/L (ref 136–145)
SPECIMEN EXPIRATION DATE: NORMAL
WBC # BLD AUTO: 5.5 10E3/UL (ref 4–11)

## 2023-08-25 PROCEDURE — 83550 IRON BINDING TEST: CPT | Performed by: EMERGENCY MEDICINE

## 2023-08-25 PROCEDURE — 99284 EMERGENCY DEPT VISIT MOD MDM: CPT | Performed by: EMERGENCY MEDICINE

## 2023-08-25 PROCEDURE — 86901 BLOOD TYPING SEROLOGIC RH(D): CPT | Performed by: EMERGENCY MEDICINE

## 2023-08-25 PROCEDURE — 86850 RBC ANTIBODY SCREEN: CPT | Performed by: EMERGENCY MEDICINE

## 2023-08-25 PROCEDURE — 99283 EMERGENCY DEPT VISIT LOW MDM: CPT

## 2023-08-25 PROCEDURE — 82728 ASSAY OF FERRITIN: CPT | Performed by: EMERGENCY MEDICINE

## 2023-08-25 PROCEDURE — 85025 COMPLETE CBC W/AUTO DIFF WBC: CPT | Performed by: EMERGENCY MEDICINE

## 2023-08-25 PROCEDURE — 36415 COLL VENOUS BLD VENIPUNCTURE: CPT | Performed by: EMERGENCY MEDICINE

## 2023-08-25 PROCEDURE — 80053 COMPREHEN METABOLIC PANEL: CPT | Performed by: EMERGENCY MEDICINE

## 2023-08-25 ASSESSMENT — ACTIVITIES OF DAILY LIVING (ADL)
ADLS_ACUITY_SCORE: 35
ADLS_ACUITY_SCORE: 35

## 2023-08-25 NOTE — ED PROVIDER NOTES
History     Chief Complaint   Patient presents with    Abnormal Labs     HPI  Monty Cox is a 57 year old male with history of alcohol use disorder, alcohol-induced pancreatitis, end-stage renal disease (no longer on dialysis) who was sent from alcohol treatment facility for anemia.  Blood draw from yesterday shows hemoglobin of 6.6.  Was 7.4 on intake.  Has been in treatment program for 2 weeks and states it is going well.  Patient feels well.  Denies any abdominal pain.  No melena or bright red blood in stool.  No blood in his urine.  No emesis.  No hemoptysis.  Was on dialysis for a year to a year and a half for acute renal failure and has now been off from dialysis for more than a year.  No chest pain or shortness of breath.  No lightheadedness.    The patient's PMHx, Surgical Hx, Allergies, and Medications were all reviewed with the patient.    Allergies:  No Known Allergies    Problem List:    Patient Active Problem List    Diagnosis Date Noted    Alcohol use disorder, severe, dependence (H) 08/08/2023     Priority: Medium    Alcohol dependence with uncomplicated intoxication (H) 08/08/2023     Priority: Medium    Metabolic acidosis 05/20/2022     Priority: Medium    Age-related cataract of both eyes 03/09/2021     Priority: Medium     Last Assessment & Plan:   Formatting of this note might be different from the original.  - Mild cataracts in both eyes, L > R, not visually significant   - Non-surgical at this time, good VA with new glasses prescription  - Patient educated, will observe      Hyperopia of both eyes with astigmatism and presbyopia 03/09/2021     Priority: Medium     Last Assessment & Plan:   Formatting of this note might be different from the original.  - Continue wearing current glasses      Bandemia 01/18/2021     Priority: Medium    Polyarticular gout 01/18/2021     Priority: Medium    PINEDA (acute kidney injury) (H) 12/28/2020     Priority: Medium     Last Assessment & Plan:    Formatting of this note might be different from the original.  A: Doing well. He continues on MWF HD @ [facility] and has not missed any dialysis sessions. Nephrologist is Dr. Avery. Taking lasix 80mg BID and endorsing significant urine output. He was told that he may not need dialysis for much longer.   P: BMP today. Will reach out to Dr. Avery to learn more about longterm plans. Continue PTA lasix 80mg BID for now.      Alcohol withdrawal syndrome with complication (H) 12/28/2020     Priority: Medium     Last Assessment & Plan:   Formatting of this note might be different from the original.  A: Recent admission for alcohol withdrawal complicated by hepatitis and pancreatitis requiring pressors and intubation. Patient has been home for the past week and doing well. He has avoided alcohol and thinks the IM naltrexone has been helpful to curb cravings. From chart review, he did have significantly elevated liver enzymes and continues to have some mild scleral icterus on examination today. Imaging showed hepatosteatosis.   P: Spent significant time with patient discussing his lab results and alcohol use. Fortunately, he does not appear to have cirrhosis on imaging although is at risk if he continues to consume alcohol. Patient was counseled on alcohol cessation and will continue on IM naltrexone. Repeat hepatic panel today.      Alcohol-induced acute pancreatitis, unspecified complication status 12/28/2020     Priority: Medium    Moderate episode of recurrent major depressive disorder (H) 04/21/2020     Priority: Medium     Last Assessment & Plan:   Formatting of this note might be different from the original.  Plan as above      Gout 12/05/2018     Priority: Medium     Last Assessment & Plan:   Formatting of this note might be different from the original.  A: On allopurinol & prednisone taper.   P: Currently on prednisone taper @ 30mg daily.      Finger stiffness 02/08/2017     Priority: Medium     Prediabetes 01/29/2016     Priority: Medium     Last Assessment & Plan:   Formatting of this note might be different from the original.  - No diabetic retinopathy in either eye on exam today. Most recent A1C was 5.8% on 01/17/21.  - Discussed the importance of good blood sugar control under PCP supervision and yearly exams  - Note sent to managing provider  - Recheck annually      Anxiety disorder 06/23/2010     Priority: Medium     Last Assessment & Plan:   Formatting of this note might be different from the original.  Mood has been stable.  Anxiety fluctuates and the patient notes that it is still present.  Had complained of worsening anxiety about 1 month ago when he was seen here by a different provider.  No changes to the medications were made.  Today he is saying that anxiety is not bothering him too much.  Cannot remember the last time he had a panic attack.  Discussed options including titrating medications, escitalopram, to better address this symptom though the patient was not interested at this time.  Discussed that changes could be made in the future if need be.  Recommended that he call the clinic if he wanted to make medication changes and we could discuss this.  His kidney function has improved and is now off of hemodialysis for the past 1 month.  GFR is around 40.  We could safely increase either the gabapentin or Lexapro if need be.  He is sleeping well and using trazodone as needed.  No acute safety concerns.  It is encouraging that he is established with an individual psychotherapist on his own and outside of our system.  -Continue escitalopram 5 mg daily  -Continue gabapentin 100 mg 3 times daily  -Continue Vivitrol 380 mg IM monthly  -Continue trazodone 50 mg at bedtime as needed for insomnia  -Follow-up in 3 months or earlier if needed  -Continue psychotherapy weekly with outside individual psychotherapist    Last Assessment & Plan:   Formatting of this note might be different from the  original.  Plan as above          Past Medical History:    No past medical history on file.    Past Surgical History:    No past surgical history on file.    Family History:    Family History   Problem Relation Age of Onset    Substance Abuse Father     Substance Abuse Nephew        Social History:  Marital Status:   [2]  Social History     Tobacco Use    Smoking status: Never    Smokeless tobacco: Never        Medications:    allopurinol (ZYLOPRIM) 100 MG tablet  atorvastatin (LIPITOR) 20 MG tablet  blood glucose (ACCU-CHEK GUIDE) test strip  blood glucose monitoring (SOFTCLIX) lancets  Blood Glucose Monitoring Suppl (ACCU-CHEK GUIDE) w/Device KIT  disulfiram (ANTABUSE) 250 MG tablet  escitalopram (LEXAPRO) 10 MG tablet  folic acid (FOLVITE) 1 MG tablet  gabapentin (NEURONTIN) 100 MG capsule  naltrexone (DEPADE/REVIA) 50 MG tablet  traZODone (DESYREL) 100 MG tablet          Review of Systems  Pertinent positives and negatives mentioned in HPI    Physical Exam   BP: 122/74  Pulse: 72  Temp: 97.5  F (36.4  C)  Resp: 14  Height: 182.9 cm (6')  Weight: 77.3 kg (170 lb 6.4 oz)  SpO2: 100 %    Physical Exam  GEN: Awake, alert, and cooperative. No acute distress.  HENT: MMM. External ears and nose normal bilaterally.  EYES: EOM intact. Conjunctiva clear. No discharge.   NECK: Symmetric, freely mobile.   CV : Regular rate and rhythm.  PULM: Normal effort. No wheezes, rales, or rhonchi bilaterally.  ABD: Soft, non-tender, non-distended. No rebound or guarding.   NEURO: Normal speech. Following commands. CN II-XII grossly intact. Answering questions and interacting appropriately.   EXT: No gross deformity. Warm and well perfused.  INT: Warm. No diaphoresis. Normal color.        ED Course        Procedures       Critical Care time:  none Due to at                Results for orders placed or performed during the hospital encounter of 08/25/23 (from the past 24 hour(s))   CBC with platelets differential    Narrative     The following orders were created for panel order CBC with platelets differential.  Procedure                               Abnormality         Status                     ---------                               -----------         ------                     CBC with platelets and d...[[id number]]  Abnormal            Final result                 Please view results for these tests on the individual orders.   Comprehensive metabolic panel   Result Value Ref Range    Sodium 140 136 - 145 mmol/L    Potassium 5.0 3.4 - 5.3 mmol/L    Chloride 107 98 - 107 mmol/L    Carbon Dioxide (CO2) 21 (L) 22 - 29 mmol/L    Anion Gap 12 7 - 15 mmol/L    Urea Nitrogen 33.9 (H) 6.0 - 20.0 mg/dL    Creatinine 1.82 (H) 0.67 - 1.17 mg/dL    Calcium 9.5 8.6 - 10.0 mg/dL    Glucose 96 70 - 99 mg/dL    Alkaline Phosphatase 84 40 - 129 U/L    AST 27 0 - 45 U/L    ALT 13 0 - 70 U/L    Protein Total 8.2 6.4 - 8.3 g/dL    Albumin 3.9 3.5 - 5.2 g/dL    Bilirubin Total 0.2 <=1.2 mg/dL    GFR Estimate 43 (L) >60 mL/min/1.73m2   ABO/Rh type and screen    Narrative    The following orders were created for panel order ABO/Rh type and screen.  Procedure                               Abnormality         Status                     ---------                               -----------         ------                     Adult Type and Screen[[id number]]                            Edited Result - FINAL        Please view results for these tests on the individual orders.   [place] Draw    Narrative    The following orders were created for panel order [place] Draw.  Procedure                               Abnormality         Status                     ---------                               -----------         ------                     Extra Blue Top Tube[[id number]]                              Final result               Extra Red Top Tube[[id number]]                               Final result                 Please view results for these tests on the individual  orders.   CBC with platelets and differential   Result Value Ref Range    WBC Count 5.5 4.0 - 11.0 10e3/uL    RBC Count 2.49 (L) 4.40 - 5.90 10e6/uL    Hemoglobin 7.5 (L) 13.3 - 17.7 g/dL    Hematocrit 23.8 (L) 40.0 - 53.0 %    MCV 96 78 - 100 fL    MCH 30.1 26.5 - 33.0 pg    MCHC 31.5 31.5 - 36.5 g/dL    RDW 18.6 (H) 10.0 - 15.0 %    Platelet Count 315 150 - 450 10e3/uL    % Neutrophils 53 %    % Lymphocytes 29 %    % Monocytes 14 %    % Eosinophils 3 %    % Basophils 1 %    % Immature Granulocytes 0 %    NRBCs per 100 WBC 0 <1 /100    Absolute Neutrophils 2.9 1.6 - 8.3 10e3/uL    Absolute Lymphocytes 1.6 0.8 - 5.3 10e3/uL    Absolute Monocytes 0.8 0.0 - 1.3 10e3/uL    Absolute Eosinophils 0.2 0.0 - 0.7 10e3/uL    Absolute Basophils 0.0 0.0 - 0.2 10e3/uL    Absolute Immature Granulocytes 0.0 <=0.4 10e3/uL    Absolute NRBCs 0.0 10e3/uL   Adult Type and Screen   Result Value Ref Range    ABO/RH(D) A POS     Antibody Screen Negative Negative    SPECIMEN EXPIRATION DATE [id number]    Extra Blue Top Tube   Result Value Ref Range    Hold Specimen JIC    Extra Red Top Tube   Result Value Ref Range    Hold Specimen JIC    Iron and iron binding capacity   Result Value Ref Range    Iron 16 (L) 61 - 157 ug/dL    Iron Binding Capacity 289 240 - 430 ug/dL    Iron Sat Index 6 (L) 15 - 46 %   Ferritin   Result Value Ref Range    Ferritin 249 31 - 409 ng/mL       Medications - No data to display    Assessments & Plan (with Medical Decision Making)   57 year old male with past medical history notable for CKD, alcohol abuse, CKD 3, who is currently in alcohol treatment and sent for evaluation of hemoglobin of 6.6.  Patient is asymptomatic and no abdominal pain.  No obvious bleeding reported.  Repeat hemoglobin in department 7.5.  No indication for transfusion at this time.  Iron studies pending but will not ultimately change his disposition.  Patient states that he is known to be anemic but is unclear of why this is.  Could be  related to his chronic kidney disease.  If he has not had colonoscopy then should have this to further evaluate.  Creatinine is slightly up from baseline at 1.82.    After disposition iron returned low at 16, iron saturation of 6 and iron binding capacity was normal.  Ferritin normal at 2.49.  MCV is normal and MCHC also normal.  Normal platelet count.  Okay to follow-up with primary team once he completes inpatient alcohol treatment program.  ED return precautions discussed.    I have reviewed the nursing notes.         Discharge Medication List as of 8/25/2023  4:55 PM          Final diagnoses:   Anemia due to stage 3b chronic kidney disease (H)     Mikey Gibbons MD    8/25/2023   [facility] EMERGENCY DEPT    Disclaimer: This note consists of words and symbols derived from keyboarding and dictation using voice recognition software.  As a result, there may be errors that have gone undetected.  Please consider this when interpreting information found in this note.               Mikey Gibbons MD  08/25/23 [id number]

## 2023-08-25 NOTE — DISCHARGE INSTRUCTIONS
Your hemoglobin emergency room was 7.5.  No indication for blood transfusion at this time.  I do not see any obvious source of bleeding currently.  If you have not had a colonoscopy in the past few years you should discuss this with your primary care team and to have 1 completed after you are done with your treatment at [facility].     If you have any black-colored stools, blood in your stool, lightheadedness, severe abdominal pain, seek medical attention immediately.

## 2023-08-25 NOTE — ED TRIAGE NOTES
Patient was sent from [facility] for low Hgb. Last check today was 6.6.  Pt is asymptomatic. Sclera is pale.

## 2023-08-25 NOTE — ED NOTES
[facility] called and report given. They are aware pt is ready for discharge and are sending transport for pt.

## 2024-06-25 ENCOUNTER — HOSPITAL ENCOUNTER (INPATIENT)
Facility: CLINIC | Age: 59
LOS: 3 days | Discharge: PSYCHIATRIC OR SUBSTANCE USE FACILITY | DRG: 897 | End: 2024-06-28
Attending: EMERGENCY MEDICINE | Admitting: PSYCHIATRY & NEUROLOGY
Payer: COMMERCIAL

## 2024-06-25 ENCOUNTER — TELEPHONE (OUTPATIENT)
Dept: BEHAVIORAL HEALTH | Facility: CLINIC | Age: 59
End: 2024-06-25

## 2024-06-25 DIAGNOSIS — Z11.52 ENCOUNTER FOR SCREENING FOR COVID-19: Primary | ICD-10-CM

## 2024-06-25 DIAGNOSIS — F10.10 ALCOHOL ABUSE: ICD-10-CM

## 2024-06-25 DIAGNOSIS — N17.9 ACUTE KIDNEY INJURY (H): ICD-10-CM

## 2024-06-25 DIAGNOSIS — F10.220 ALCOHOL DEPENDENCE WITH UNCOMPLICATED INTOXICATION (H): ICD-10-CM
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
## 2024-06-25 DIAGNOSIS — R73.03 PREDIABETES: ICD-10-CM

## 2024-06-25 DIAGNOSIS — K85.20 ALCOHOL-INDUCED ACUTE PANCREATITIS, UNSPECIFIED COMPLICATION STATUS: ICD-10-CM

## 2024-06-25 DIAGNOSIS — F10.939 ALCOHOL WITHDRAWAL SYNDROME WITH COMPLICATION (H): ICD-10-CM

## 2024-06-25 DIAGNOSIS — F10.20 ALCOHOL USE DISORDER, SEVERE, DEPENDENCE (H): ICD-10-CM

## 2024-06-25 DIAGNOSIS — F33.1 MODERATE EPISODE OF RECURRENT MAJOR DEPRESSIVE DISORDER (H): ICD-10-CM

## 2024-06-25 LAB
ALBUMIN SERPL BCG-MCNC: 4.5 G/DL (ref 3.5–5.2)
ALCOHOL BREATH TEST: 0.27 (ref 0–0.01)
ALP SERPL-CCNC: 116 U/L (ref 40–150)
ALT SERPL W P-5'-P-CCNC: 79 U/L (ref 0–70)
AMPHETAMINES UR QL SCN: ABNORMAL
ANION GAP SERPL CALCULATED.3IONS-SCNC: 17 MMOL/L (ref 7–15)
ANION GAP SERPL CALCULATED.3IONS-SCNC: 22 MMOL/L (ref 7–15)
AST SERPL W P-5'-P-CCNC: 201 U/L (ref 0–45)
BARBITURATES UR QL SCN: ABNORMAL
BASOPHILS # BLD AUTO: 0 10E3/UL (ref 0–0.2)
BASOPHILS NFR BLD AUTO: 1 %
BENZODIAZ UR QL SCN: ABNORMAL
BILIRUB SERPL-MCNC: 0.4 MG/DL
BUN SERPL-MCNC: 22.2 MG/DL (ref 6–20)
BUN SERPL-MCNC: 22.7 MG/DL (ref 6–20)
BZE UR QL SCN: ABNORMAL
CALCIUM SERPL-MCNC: 8.1 MG/DL (ref 8.6–10)
CALCIUM SERPL-MCNC: 8.7 MG/DL (ref 8.6–10)
CANNABINOIDS UR QL SCN: ABNORMAL
CHLORIDE SERPL-SCNC: 100 MMOL/L (ref 98–107)
CHLORIDE SERPL-SCNC: 99 MMOL/L (ref 98–107)
CREAT BLD-MCNC: 2.4 MG/DL (ref 0.7–1.3)
CREAT SERPL-MCNC: 2.09 MG/DL (ref 0.67–1.17)
CREAT SERPL-MCNC: 2.31 MG/DL (ref 0.67–1.17)
DEPRECATED HCO3 PLAS-SCNC: 18 MMOL/L (ref 22–29)
DEPRECATED HCO3 PLAS-SCNC: 20 MMOL/L (ref 22–29)
EGFRCR SERPLBLD CKD-EPI 2021: 31 ML/MIN/1.73M2
EGFRCR SERPLBLD CKD-EPI 2021: 32 ML/MIN/1.73M2
EGFRCR SERPLBLD CKD-EPI 2021: 36 ML/MIN/1.73M2
EOSINOPHIL # BLD AUTO: 0.1 10E3/UL (ref 0–0.7)
EOSINOPHIL NFR BLD AUTO: 2 %
ERYTHROCYTE [DISTWIDTH] IN BLOOD BY AUTOMATED COUNT: 17.4 % (ref 10–15)
ETHANOL SERPL-MCNC: 0.3 G/DL
FENTANYL UR QL: ABNORMAL
FLUAV RNA SPEC QL NAA+PROBE: NEGATIVE
FLUBV RNA RESP QL NAA+PROBE: NEGATIVE
GLUCOSE BLDC GLUCOMTR-MCNC: 149 MG/DL (ref 70–99)
GLUCOSE SERPL-MCNC: 179 MG/DL (ref 70–99)
GLUCOSE SERPL-MCNC: 99 MG/DL (ref 70–99)
HCT VFR BLD AUTO: 33.3 % (ref 40–53)
HGB BLD-MCNC: 11 G/DL (ref 13.3–17.7)
IMM GRANULOCYTES # BLD: 0 10E3/UL
IMM GRANULOCYTES NFR BLD: 1 %
LYMPHOCYTES # BLD AUTO: 1.4 10E3/UL (ref 0.8–5.3)
LYMPHOCYTES NFR BLD AUTO: 36 %
MAGNESIUM SERPL-MCNC: 2 MG/DL (ref 1.7–2.3)
MCH RBC QN AUTO: 27.7 PG (ref 26.5–33)
MCHC RBC AUTO-ENTMCNC: 33 G/DL (ref 31.5–36.5)
MCV RBC AUTO: 84 FL (ref 78–100)
MONOCYTES # BLD AUTO: 0.4 10E3/UL (ref 0–1.3)
MONOCYTES NFR BLD AUTO: 10 %
NEUTROPHILS # BLD AUTO: 2.1 10E3/UL (ref 1.6–8.3)
NEUTROPHILS NFR BLD AUTO: 50 %
NRBC # BLD AUTO: 0 10E3/UL
NRBC BLD AUTO-RTO: 0 /100
OPIATES UR QL SCN: ABNORMAL
PCP QUAL URINE (ROCHE): ABNORMAL
PLATELET # BLD AUTO: 118 10E3/UL (ref 150–450)
POTASSIUM SERPL-SCNC: 3.6 MMOL/L (ref 3.4–5.3)
POTASSIUM SERPL-SCNC: 3.7 MMOL/L (ref 3.4–5.3)
PROT SERPL-MCNC: 8.5 G/DL (ref 6.4–8.3)
RBC # BLD AUTO: 3.97 10E6/UL (ref 4.4–5.9)
RSV RNA SPEC NAA+PROBE: NEGATIVE
SARS-COV-2 RNA RESP QL NAA+PROBE: NEGATIVE
SODIUM SERPL-SCNC: 136 MMOL/L (ref 135–145)
SODIUM SERPL-SCNC: 140 MMOL/L (ref 135–145)
WBC # BLD AUTO: 4 10E3/UL (ref 4–11)

## 2024-06-25 PROCEDURE — HZ2ZZZZ DETOXIFICATION SERVICES FOR SUBSTANCE ABUSE TREATMENT: ICD-10-PCS | Performed by: PSYCHIATRY & NEUROLOGY

## 2024-06-25 PROCEDURE — 83735 ASSAY OF MAGNESIUM: CPT | Performed by: EMERGENCY MEDICINE

## 2024-06-25 PROCEDURE — 82075 ASSAY OF BREATH ETHANOL: CPT | Performed by: EMERGENCY MEDICINE

## 2024-06-25 PROCEDURE — 82374 ASSAY BLOOD CARBON DIOXIDE: CPT | Performed by: EMERGENCY MEDICINE

## 2024-06-25 PROCEDURE — 84155 ASSAY OF PROTEIN SERUM: CPT | Performed by: EMERGENCY MEDICINE

## 2024-06-25 PROCEDURE — 250N000013 HC RX MED GY IP 250 OP 250 PS 637

## 2024-06-25 PROCEDURE — 87637 SARSCOV2&INF A&B&RSV AMP PRB: CPT | Performed by: FAMILY MEDICINE

## 2024-06-25 PROCEDURE — 80048 BASIC METABOLIC PNL TOTAL CA: CPT | Performed by: EMERGENCY MEDICINE

## 2024-06-25 PROCEDURE — 99285 EMERGENCY DEPT VISIT HI MDM: CPT | Performed by: EMERGENCY MEDICINE

## 2024-06-25 PROCEDURE — 96360 HYDRATION IV INFUSION INIT: CPT | Performed by: EMERGENCY MEDICINE

## 2024-06-25 PROCEDURE — 85004 AUTOMATED DIFF WBC COUNT: CPT | Performed by: EMERGENCY MEDICINE

## 2024-06-25 PROCEDURE — 250N000013 HC RX MED GY IP 250 OP 250 PS 637: Performed by: PSYCHIATRY & NEUROLOGY

## 2024-06-25 PROCEDURE — 36415 COLL VENOUS BLD VENIPUNCTURE: CPT | Performed by: EMERGENCY MEDICINE

## 2024-06-25 PROCEDURE — 128N000004 HC R&B CD ADULT

## 2024-06-25 PROCEDURE — 99285 EMERGENCY DEPT VISIT HI MDM: CPT | Mod: 25 | Performed by: EMERGENCY MEDICINE

## 2024-06-25 PROCEDURE — 82077 ASSAY SPEC XCP UR&BREATH IA: CPT | Performed by: EMERGENCY MEDICINE

## 2024-06-25 PROCEDURE — 80307 DRUG TEST PRSMV CHEM ANLYZR: CPT | Performed by: EMERGENCY MEDICINE

## 2024-06-25 PROCEDURE — 258N000003 HC RX IP 258 OP 636: Mod: JZ | Performed by: EMERGENCY MEDICINE

## 2024-06-25 PROCEDURE — 82565 ASSAY OF CREATININE: CPT

## 2024-06-25 RX ORDER — FOLIC ACID 1 MG/1
1 TABLET ORAL DAILY
Status: DISCONTINUED | OUTPATIENT
Start: 2024-06-26 | End: 2024-06-28 | Stop reason: HOSPADM

## 2024-06-25 RX ORDER — OLANZAPINE 10 MG/1
10 TABLET ORAL 3 TIMES DAILY PRN
Status: DISCONTINUED | OUTPATIENT
Start: 2024-06-25 | End: 2024-06-28 | Stop reason: HOSPADM

## 2024-06-25 RX ORDER — AMOXICILLIN 250 MG
1 CAPSULE ORAL 2 TIMES DAILY PRN
Status: DISCONTINUED | OUTPATIENT
Start: 2024-06-25 | End: 2024-06-28 | Stop reason: HOSPADM

## 2024-06-25 RX ORDER — ACETAMINOPHEN 325 MG/1
650 TABLET ORAL EVERY 8 HOURS PRN
Status: ON HOLD | COMMUNITY
Start: 2024-03-08 | End: 2024-06-28

## 2024-06-25 RX ORDER — KETOCONAZOLE 20 MG/G
CREAM TOPICAL 2 TIMES DAILY
Status: ON HOLD | COMMUNITY
Start: 2024-03-08 | End: 2024-06-28

## 2024-06-25 RX ORDER — TRAZODONE HYDROCHLORIDE 50 MG/1
50 TABLET, FILM COATED ORAL
Status: DISCONTINUED | OUTPATIENT
Start: 2024-06-25 | End: 2024-06-26

## 2024-06-25 RX ORDER — MULTIPLE VITAMINS W/ MINERALS TAB 9MG-400MCG
1 TAB ORAL DAILY
Status: DISCONTINUED | OUTPATIENT
Start: 2024-06-26 | End: 2024-06-28 | Stop reason: HOSPADM

## 2024-06-25 RX ORDER — DIAZEPAM 5 MG
5-20 TABLET ORAL EVERY 30 MIN PRN
Status: DISCONTINUED | OUTPATIENT
Start: 2024-06-25 | End: 2024-06-28 | Stop reason: HOSPADM

## 2024-06-25 RX ORDER — MAGNESIUM HYDROXIDE/ALUMINUM HYDROXICE/SIMETHICONE 120; 1200; 1200 MG/30ML; MG/30ML; MG/30ML
30 SUSPENSION ORAL EVERY 4 HOURS PRN
Status: DISCONTINUED | OUTPATIENT
Start: 2024-06-25 | End: 2024-06-28 | Stop reason: HOSPADM

## 2024-06-25 RX ORDER — ACETAMINOPHEN 325 MG/1
650 TABLET ORAL EVERY 4 HOURS PRN
Status: DISCONTINUED | OUTPATIENT
Start: 2024-06-25 | End: 2024-06-28 | Stop reason: HOSPADM

## 2024-06-25 RX ORDER — HYDROXYZINE HYDROCHLORIDE 25 MG/1
25 TABLET, FILM COATED ORAL EVERY 4 HOURS PRN
Status: DISCONTINUED | OUTPATIENT
Start: 2024-06-25 | End: 2024-06-28 | Stop reason: HOSPADM

## 2024-06-25 RX ORDER — LORAZEPAM 1 MG/1
1-4 TABLET ORAL EVERY 30 MIN PRN
Status: DISCONTINUED | OUTPATIENT
Start: 2024-06-25 | End: 2024-06-25

## 2024-06-25 RX ORDER — ATENOLOL 50 MG/1
50 TABLET ORAL DAILY PRN
Status: DISCONTINUED | OUTPATIENT
Start: 2024-06-25 | End: 2024-06-28 | Stop reason: HOSPADM

## 2024-06-25 RX ORDER — OLANZAPINE 10 MG/2ML
10 INJECTION, POWDER, FOR SOLUTION INTRAMUSCULAR 3 TIMES DAILY PRN
Status: DISCONTINUED | OUTPATIENT
Start: 2024-06-25 | End: 2024-06-28 | Stop reason: HOSPADM

## 2024-06-25 RX ORDER — LOPERAMIDE HCL 2 MG
2 CAPSULE ORAL 4 TIMES DAILY PRN
Status: DISCONTINUED | OUTPATIENT
Start: 2024-06-25 | End: 2024-06-28 | Stop reason: HOSPADM

## 2024-06-25 RX ORDER — HYDROXYZINE HYDROCHLORIDE 25 MG/1
25 TABLET, FILM COATED ORAL EVERY 8 HOURS PRN
Status: ON HOLD | COMMUNITY
Start: 2023-12-14 | End: 2024-06-28

## 2024-06-25 RX ORDER — ONDANSETRON 4 MG/1
4 TABLET, FILM COATED ORAL EVERY 6 HOURS PRN
Status: DISCONTINUED | OUTPATIENT
Start: 2024-06-25 | End: 2024-06-28 | Stop reason: HOSPADM

## 2024-06-25 RX ADMIN — SODIUM CHLORIDE 1000 ML: 9 INJECTION, SOLUTION INTRAVENOUS at 11:40

## 2024-06-25 RX ADMIN — DIAZEPAM 10 MG: 5 TABLET ORAL at 16:12

## 2024-06-25 RX ADMIN — DIAZEPAM 5 MG: 5 TABLET ORAL at 20:12

## 2024-06-25 RX ADMIN — DIAZEPAM 10 MG: 5 TABLET ORAL at 22:19

## 2024-06-25 RX ADMIN — TRAZODONE HYDROCHLORIDE 50 MG: 50 TABLET ORAL at 22:20

## 2024-06-25 RX ADMIN — SODIUM CHLORIDE 1000 ML: 9 INJECTION, SOLUTION INTRAVENOUS at 16:12

## 2024-06-25 RX ADMIN — DIAZEPAM 10 MG: 5 TABLET ORAL at 18:37

## 2024-06-25 RX ADMIN — DIAZEPAM 10 MG: 5 TABLET ORAL at 14:51

## 2024-06-25 ASSESSMENT — COLUMBIA-SUICIDE SEVERITY RATING SCALE - C-SSRS
2. HAVE YOU ACTUALLY HAD ANY THOUGHTS OF KILLING YOURSELF IN THE PAST MONTH?: NO
6. HAVE YOU EVER DONE ANYTHING, STARTED TO DO ANYTHING, OR PREPARED TO DO ANYTHING TO END YOUR LIFE?: NO
1. IN THE PAST MONTH, HAVE YOU WISHED YOU WERE DEAD OR WISHED YOU COULD GO TO SLEEP AND NOT WAKE UP?: NO

## 2024-06-25 ASSESSMENT — ACTIVITIES OF DAILY LIVING (ADL)
ADLS_ACUITY_SCORE: 37
HYGIENE/GROOMING: INDEPENDENT
ADLS_ACUITY_SCORE: 37
ADLS_ACUITY_SCORE: 42
ADLS_ACUITY_SCORE: 37
ADLS_ACUITY_SCORE: 37
ORAL_HYGIENE: INDEPENDENT
ADLS_ACUITY_SCORE: 37
ADLS_ACUITY_SCORE: 37
DRESS: INDEPENDENT
ADLS_ACUITY_SCORE: 37
ADLS_ACUITY_SCORE: 37
ADLS_ACUITY_SCORE: 35
ADLS_ACUITY_SCORE: 37
ADLS_ACUITY_SCORE: 57
ADLS_ACUITY_SCORE: 37
ADLS_ACUITY_SCORE: 35

## 2024-06-25 ASSESSMENT — LIFESTYLE VARIABLES: SKIP TO QUESTIONS 9-10: 0

## 2024-06-25 NOTE — TELEPHONE ENCOUNTER
12:14 PM    Provider Partha accepts pt for [facility].    Intake has called [facility] JEWEL Felix and ED nurse Taylor to notify. Per JEWEL Felix, she will call ED later for report. The pt. has been added to the queue, the admit board and all transfer items (Guarantor, indicia, bed planning, MH Internal checklist) complete.

## 2024-06-25 NOTE — ED PROVIDER NOTES
History     Chief Complaint   Patient presents with    Alcohol Problem     Pt wants detox, last drink this AM and history of seizures.     HPI  Monty Cox is a 58 year old male with a past medical history of alcohol abuse, cataracts, anxiety, pancreatitis, gout, prediabetes who presents to the emergency department seeking detox placement.  He notes that he has been drinking a pint of hard liquor a day for the past 1 to 2 weeks.  He denies any other drug use on top of this.  His last drink was this morning.  He does have a history of seizures, he states he has had 2 in the past, with her most recent one being about a year ago.  No suicidal or homicidal ideation.  Patient denies any acute physical symptoms currently.    I have reviewed the Medications, Allergies, Past Medical and Surgical History, and Social History in the Epic system.    No past medical history on file.  No past surgical history on file.  No current facility-administered medications for this encounter.     Current Outpatient Medications   Medication Sig Dispense Refill    allopurinol (ZYLOPRIM) 100 MG tablet Take 0.5 tablets (50 mg) by mouth daily 15 tablet 0    atorvastatin (LIPITOR) 20 MG tablet Take 1 tablet (20 mg) by mouth daily 30 tablet 0    blood glucose (ACCU-CHEK GUIDE) test strip Test 2 times per day. Pharmacy allowed to substitute per patient's insurance.      blood glucose monitoring (SOFTCLIX) lancets Test 2 times per day. Call your doctor if blood glucose is > 300.      Blood Glucose Monitoring Suppl (ACCU-CHEK GUIDE) w/Device KIT       disulfiram (ANTABUSE) 250 MG tablet Take 1 tablet (250 mg) by mouth daily 30 tablet 3    escitalopram (LEXAPRO) 10 MG tablet Take 1 tablet (10 mg) by mouth daily 30 tablet 1    folic acid (FOLVITE) 1 MG tablet Take 1 tablet (1 mg) by mouth daily 30 tablet 1    gabapentin (NEURONTIN) 100 MG capsule Take 1 capsule (100 mg) by mouth 3 times daily 90 capsule 1    naltrexone (DEPADE/REVIA) 50 MG  tablet Take 1 tablet (50 mg) by mouth daily 30 tablet 0    traZODone (DESYREL) 100 MG tablet Take 1 tablet (100 mg) by mouth nightly as needed for sleep (may repeat after 60 minutes) 30 tablet 1     No Known Allergies  Past medical history, past surgical history, medications, and allergies were reviewed with the patient. Additional pertinent items: None    Social History     Socioeconomic History    Marital status:      Spouse name: Not on file    Number of children: Not on file    Years of education: Not on file    Highest education level: Not on file   Occupational History    Not on file   Tobacco Use    Smoking status: Never    Smokeless tobacco: Never   Substance and Sexual Activity    Alcohol use: Not on file    Drug use: Not on file    Sexual activity: Not on file   Other Topics Concern    Parent/sibling w/ CABG, MI or angioplasty before 65F 55M? Not Asked   Social History Narrative    Not on file     Social Determinants of Health     Financial Resource Strain: Not on file   Food Insecurity: Not on file   Transportation Needs: Not on file   Physical Activity: Not on file   Stress: Not on file   Social Connections: Not on file   Interpersonal Safety: Not on file   Housing Stability: Not on file     Social history was reviewed with the patient. Additional pertinent items: None    Review of Systems  A medically appropriate review of systems was performed with pertinent positives and negatives noted in the HPI, and all other systems negative.    Physical Exam   BP: (!) 164/93  Pulse: 106  Temp: 98.1  F (36.7  C)  Resp: 16  Height: 182.9 cm (6')  Weight: 81.3 kg (179 lb 4.8 oz)  SpO2: 98 %      General: Well nourished, well developed, NAD  HEENT: EOMI, anicteric. NCAT, MMM  Neck: no jugular venous distension, supple, nl ROM  Cardiac: Tachycardic rate, extremities well-perfused  Pulm: NLB, normal RR  Skin: Warm and dry to the touch.  No rash  Extremities: No LE edema, no cyanosis, w/w/p  Neuro: A&Ox3, no  gross focal deficits    ED Course        Procedures                        Labs Ordered and Resulted from Time of ED Arrival to Time of ED Departure   COMPREHENSIVE METABOLIC PANEL - Abnormal       Result Value    Sodium 140      Potassium 3.7      Carbon Dioxide (CO2) 18 (*)     Anion Gap 22 (*)     Urea Nitrogen 22.7 (*)     Creatinine 2.31 (*)     GFR Estimate 32 (*)     Calcium 8.7      Chloride 100      Glucose 99      Alkaline Phosphatase 116       (*)     ALT 79 (*)     Protein Total 8.5 (*)     Albumin 4.5      Bilirubin Total 0.4     ETHYL ALCOHOL LEVEL - Abnormal    Alcohol ethyl 0.30 (*)    CBC WITH PLATELETS AND DIFFERENTIAL - Abnormal    WBC Count 4.0      RBC Count 3.97 (*)     Hemoglobin 11.0 (*)     Hematocrit 33.3 (*)     MCV 84      MCH 27.7      MCHC 33.0      RDW 17.4 (*)     Platelet Count 118 (*)     % Neutrophils 50      % Lymphocytes 36      % Monocytes 10      % Eosinophils 2      % Basophils 1      % Immature Granulocytes 1      NRBCs per 100 WBC 0      Absolute Neutrophils 2.1      Absolute Lymphocytes 1.4      Absolute Monocytes 0.4      Absolute Eosinophils 0.1      Absolute Basophils 0.0      Absolute Immature Granulocytes 0.0      Absolute NRBCs 0.0     ALCOHOL BREATH TEST POCT - Abnormal    Alcohol Breath Test 0.267 (*)    MAGNESIUM - Normal    Magnesium 2.0     URINE DRUG SCREEN PANEL            Results for orders placed or performed during the hospital encounter of 06/25/24 (from the past 24 hour(s))   Alcohol breath test POCT   Result Value Ref Range    Alcohol Breath Test 0.267 (A) 0.00 - 0.01   CBC with platelets differential    Narrative    The following orders were created for panel order CBC with platelets differential.  Procedure                               Abnormality         Status                     ---------                               -----------         ------                     CBC with platelets and d...[[id number]]  Abnormal            Final result                  Please view results for these tests on the individual orders.   Comprehensive metabolic panel   Result Value Ref Range    Sodium 140 135 - 145 mmol/L    Potassium 3.7 3.4 - 5.3 mmol/L    Carbon Dioxide (CO2) 18 (L) 22 - 29 mmol/L    Anion Gap 22 (H) 7 - 15 mmol/L    Urea Nitrogen 22.7 (H) 6.0 - 20.0 mg/dL    Creatinine 2.31 (H) 0.67 - 1.17 mg/dL    GFR Estimate 32 (L) >60 mL/min/1.73m2    Calcium 8.7 8.6 - 10.0 mg/dL    Chloride 100 98 - 107 mmol/L    Glucose 99 70 - 99 mg/dL    Alkaline Phosphatase 116 40 - 150 U/L     (H) 0 - 45 U/L    ALT 79 (H) 0 - 70 U/L    Protein Total 8.5 (H) 6.4 - 8.3 g/dL    Albumin 4.5 3.5 - 5.2 g/dL    Bilirubin Total 0.4 <=1.2 mg/dL   Magnesium   Result Value Ref Range    Magnesium 2.0 1.7 - 2.3 mg/dL   Ethyl Alcohol Level   Result Value Ref Range    Alcohol ethyl 0.30 (H) <=0.01 g/dL   CBC with platelets and differential   Result Value Ref Range    WBC Count 4.0 4.0 - 11.0 10e3/uL    RBC Count 3.97 (L) 4.40 - 5.90 10e6/uL    Hemoglobin 11.0 (L) 13.3 - 17.7 g/dL    Hematocrit 33.3 (L) 40.0 - 53.0 %    MCV 84 78 - 100 fL    MCH 27.7 26.5 - 33.0 pg    MCHC 33.0 31.5 - 36.5 g/dL    RDW 17.4 (H) 10.0 - 15.0 %    Platelet Count 118 (L) 150 - 450 10e3/uL    % Neutrophils 50 %    % Lymphocytes 36 %    % Monocytes 10 %    % Eosinophils 2 %    % Basophils 1 %    % Immature Granulocytes 1 %    NRBCs per 100 WBC 0 <1 /100    Absolute Neutrophils 2.1 1.6 - 8.3 10e3/uL    Absolute Lymphocytes 1.4 0.8 - 5.3 10e3/uL    Absolute Monocytes 0.4 0.0 - 1.3 10e3/uL    Absolute Eosinophils 0.1 0.0 - 0.7 10e3/uL    Absolute Basophils 0.0 0.0 - 0.2 10e3/uL    Absolute Immature Granulocytes 0.0 <=0.4 10e3/uL    Absolute NRBCs 0.0 10e3/uL   Urine Drug Screen    Narrative    The following orders were created for panel order Urine Drug Screen.  Procedure                               Abnormality         Status                     ---------                               -----------         ------                      Urine Drug Screen Panel[[id number]]                                                       Please view results for these tests on the individual orders.       Labs, vital signs, and imaging studies were reviewed by me.    Medications   sodium chloride 0.9% BOLUS 1,000 mL (has no administration in time range)       Assessments & Plan (with Medical Decision Making)   Monty Cox is a 58 year old male who presents to the emergency department seeking detox placement.    Patient was discussed with detox intake, they do have a bed available for the patient.  Medical clearance labs were ordered.    Laboratory work-up is remarkable for alcohol level 0.267.  White blood cell count is within normal limits.  Hemoglobin is 11 (increased from 7.5 when it was last checked 10 months ago). Cr 2.3, up from 1.8 baseline, IV fluids ordered.     I have reviewed the nursing notes.    I have reviewed the findings, diagnosis, plan and need for follow up with the patient.    Patient discussed with detox intake, to be admitted to their service for further management. Plan was discussed with patient who understands and agrees with plan.     Critical care was not performed.     Medical Decision Making  The patient's presentation was of high complexity (a chronic illness severe exacerbation, progression, or side effect of treatment).    The patient's evaluation involved:  ordering and/or review of 3+ test(s) in this encounter (see separate area of note for details)  discussion of management or test interpretation with another health professional (see separate area of note for details)    The patient's management necessitated high risk (a decision regarding hospitalization).    New Prescriptions    No medications on file       Final diagnoses:   Alcohol abuse   Acute kidney injury (H24)       ERLINDA HERNANDEZ MD  6/25/2024   [facility] EMERGENCY DEPARTMENT       Erlinda Hernandez MD  06/25/24 [id number]        Erlinda Hernandez MD  06/25/24 112

## 2024-06-25 NOTE — TELEPHONE ENCOUNTER
S: [facility] , Provider Larry calling at 11:29 AM  with clinical on a 58 year old/Male presenting for alcohol detox.     B: Pt presents for ETOH detox. Hx of alcohol 1-2 weeks, a pint of hard liq a day getting IV fluids d/t bump in creatnine    Currently reports drinking a Pint of hard liquor for 1-2 weeks.    Patient reports last use was this morning PTA.  Pt BAILEY: 0.267 breath  Pt  denies hx of DT  Pt  endorses hx of seizures. Last seizure:  2 a year ago  Pt endorsing the following symptoms of withdrawal:  None, he is still drunk  MSSA Score: Still     Pt denies acute mental health or medical concerns.   Pt denies other drug use: None Amount/frequency: N/A    Does Pt have a detox care plan in [facility]? No  Does pt present with specific needs, assistive devices, or exclusionary criteria? None  Is the patient ambulating, eating and drinking in the ED? Yes    A: Pt meets criteria to be presented for IP detox admission. Patient is voluntary    COVID Symptoms: No  If yes, COVID test required   Utox: In process  Magnesium: WNL  CMP: Abnormalities: See labs  CBC: Abnormalities: See labs  HCG: N/A     R: Patient cleared and ready for behavioral bed placement: Yes    Pt is meeting criteria for presentation to 3A/CD    Does Patient need a Transfer Center request created? No, Pt is located within [facility] ED, [facility] ED, or [place] ED

## 2024-06-25 NOTE — ED TRIAGE NOTES
Triage Assessment (Adult)       Row Name 06/25/24 0941          Triage Assessment    Airway WDL WDL        Respiratory WDL    Respiratory WDL WDL        Skin Circulation/Temperature WDL    Skin Circulation/Temperature WDL WDL        Cardiac WDL    Cardiac WDL WDL        Peripheral/Neurovascular WDL    Peripheral Neurovascular WDL WDL        Cognitive/Neuro/Behavioral WDL    Cognitive/Neuro/Behavioral WDL WDL        [place] Coma Scale    Best Eye Response 4-->(E4) spontaneous     Best Motor Response 6-->(M6) obeys commands     Best Verbal Response 5-->(V5) oriented     [place] Coma Scale Score 15

## 2024-06-25 NOTE — MEDICATION SCRIBE - ADMISSION MEDICATION HISTORY
Medication Scribe Admission Medication History    Admission medication history is complete. The information provided in this note is only as accurate as the sources available at the time of the update.    Information Source(s): Patient, Hospital records, and CareEverywhere/SureScripts via in-person    Pertinent Information: None.    Changes made to PTA medication list:  Added: Acetaminophen 325-650 mg Q 8 Hrs PRN, Hydroxyzine 25 mg Q 8 Hrs PRN, ketoconazole 2% cream BID.  Deleted: Allopurinol 50 mg daily, Disulfiram 250 mg daily, Folic acid 1 mg daily, Naltrexone 50 mg daily.  Changed: None    Allergies reviewed with patient and updates made in EHR: yes    Medication History Completed By: Mikey Vasquez MD 6/25/2024 11:52 AM    PTA Med List   Medication Sig Last Dose    acetaminophen (TYLENOL) 325 MG tablet Take 650 mg by mouth every 8 hours as needed for mild pain 6/25/2024 at am    atorvastatin (LIPITOR) 20 MG tablet Take 1 tablet (20 mg) by mouth daily More than a month at unknown    blood glucose (ACCU-CHEK GUIDE) test strip Test 2 times per day. Pharmacy allowed to substitute per patient's insurance. Past Month at unknown    blood glucose monitoring (SOFTCLIX) lancets Test 2 times per day. Call your doctor if blood glucose is > 300. Past Month at unknown    Blood Glucose Monitoring Suppl (ACCU-CHEK GUIDE) w/Device KIT  Past Month at unknown    escitalopram (LEXAPRO) 10 MG tablet Take 1 tablet (10 mg) by mouth daily Past Month at unknown    gabapentin (NEURONTIN) 100 MG capsule Take 1 capsule (100 mg) by mouth 3 times daily 6/25/2024 at am    hydrOXYzine HCl (ATARAX) 25 MG tablet Take 25 mg by mouth every 8 hours as needed for anxiety 6/24/2024 at pm    ketoconazole (NIZORAL) 2 % external cream Apply topically 2 times daily Past Week at unknown    traZODone (DESYREL) 100 MG tablet Take 1 tablet (100 mg) by mouth nightly as needed for sleep (may repeat after 60 minutes) Past Week at unknown

## 2024-06-26 PROCEDURE — 128N000004 HC R&B CD ADULT

## 2024-06-26 PROCEDURE — 99222 1ST HOSP IP/OBS MODERATE 55: CPT | Mod: AI | Performed by: NURSE PRACTITIONER

## 2024-06-26 PROCEDURE — G0177 OPPS/PHP; TRAIN & EDUC SERV: HCPCS

## 2024-06-26 PROCEDURE — 99222 1ST HOSP IP/OBS MODERATE 55: CPT

## 2024-06-26 PROCEDURE — 250N000013 HC RX MED GY IP 250 OP 250 PS 637: Performed by: NURSE PRACTITIONER

## 2024-06-26 PROCEDURE — 250N000013 HC RX MED GY IP 250 OP 250 PS 637

## 2024-06-26 PROCEDURE — 250N000013 HC RX MED GY IP 250 OP 250 PS 637: Performed by: PSYCHIATRY & NEUROLOGY

## 2024-06-26 PROCEDURE — 250N000011 HC RX IP 250 OP 636

## 2024-06-26 RX ORDER — TRAZODONE HYDROCHLORIDE 100 MG/1
100 TABLET ORAL
Status: DISCONTINUED | OUTPATIENT
Start: 2024-06-26 | End: 2024-06-26

## 2024-06-26 RX ORDER — ESCITALOPRAM OXALATE 10 MG/1
10 TABLET ORAL DAILY
Status: DISCONTINUED | OUTPATIENT
Start: 2024-06-26 | End: 2024-06-28 | Stop reason: HOSPADM

## 2024-06-26 RX ORDER — TRAZODONE HYDROCHLORIDE 100 MG/1
100 TABLET ORAL AT BEDTIME
Status: DISCONTINUED | OUTPATIENT
Start: 2024-06-26 | End: 2024-06-28 | Stop reason: HOSPADM

## 2024-06-26 RX ORDER — GABAPENTIN 300 MG/1
300 CAPSULE ORAL 3 TIMES DAILY
Status: DISCONTINUED | OUTPATIENT
Start: 2024-06-26 | End: 2024-06-28 | Stop reason: HOSPADM

## 2024-06-26 RX ORDER — ATORVASTATIN CALCIUM 20 MG/1
20 TABLET, FILM COATED ORAL DAILY
Status: DISCONTINUED | OUTPATIENT
Start: 2024-06-26 | End: 2024-06-28 | Stop reason: HOSPADM

## 2024-06-26 RX ADMIN — GABAPENTIN 300 MG: 300 CAPSULE ORAL at 20:34

## 2024-06-26 RX ADMIN — GABAPENTIN 300 MG: 300 CAPSULE ORAL at 12:11

## 2024-06-26 RX ADMIN — GABAPENTIN 300 MG: 300 CAPSULE ORAL at 16:17

## 2024-06-26 RX ADMIN — DIAZEPAM 10 MG: 5 TABLET ORAL at 12:11

## 2024-06-26 RX ADMIN — HYDROXYZINE HYDROCHLORIDE 25 MG: 25 TABLET ORAL at 07:21

## 2024-06-26 RX ADMIN — ACETAMINOPHEN 650 MG: 325 TABLET, FILM COATED ORAL at 12:11

## 2024-06-26 RX ADMIN — ESCITALOPRAM OXALATE 10 MG: 10 TABLET ORAL at 12:11

## 2024-06-26 RX ADMIN — DIAZEPAM 10 MG: 5 TABLET ORAL at 16:18

## 2024-06-26 RX ADMIN — DIAZEPAM 10 MG: 5 TABLET ORAL at 20:34

## 2024-06-26 RX ADMIN — ONDANSETRON HYDROCHLORIDE 4 MG: 4 TABLET, FILM COATED ORAL at 08:23

## 2024-06-26 RX ADMIN — ATORVASTATIN CALCIUM 20 MG: 20 TABLET, FILM COATED ORAL at 20:34

## 2024-06-26 RX ADMIN — ACETAMINOPHEN 650 MG: 325 TABLET, FILM COATED ORAL at 16:17

## 2024-06-26 RX ADMIN — TRAZODONE HYDROCHLORIDE 100 MG: 100 TABLET ORAL at 21:55

## 2024-06-26 RX ADMIN — DIAZEPAM 10 MG: 5 TABLET ORAL at 08:22

## 2024-06-26 ASSESSMENT — ACTIVITIES OF DAILY LIVING (ADL)
ADLS_ACUITY_SCORE: 42
DRESS: INDEPENDENT
HYGIENE/GROOMING: INDEPENDENT
ADLS_ACUITY_SCORE: 42
HYGIENE/GROOMING: INDEPENDENT
ADLS_ACUITY_SCORE: 42
ADLS_ACUITY_SCORE: 42
ORAL_HYGIENE: INDEPENDENT
ADLS_ACUITY_SCORE: 42
DRESS: INDEPENDENT
ADLS_ACUITY_SCORE: 42
ORAL_HYGIENE: INDEPENDENT
ADLS_ACUITY_SCORE: 42

## 2024-06-26 ASSESSMENT — ANXIETY QUESTIONNAIRES
6. BECOMING EASILY ANNOYED OR IRRITABLE: SEVERAL DAYS
7. FEELING AFRAID AS IF SOMETHING AWFUL MIGHT HAPPEN: NEARLY EVERY DAY
GAD7 TOTAL SCORE: 19
IF YOU CHECKED OFF ANY PROBLEMS ON THIS QUESTIONNAIRE, HOW DIFFICULT HAVE THESE PROBLEMS MADE IT FOR YOU TO DO YOUR WORK, TAKE CARE OF THINGS AT HOME, OR GET ALONG WITH OTHER PEOPLE: VERY DIFFICULT
2. NOT BEING ABLE TO STOP OR CONTROL WORRYING: NEARLY EVERY DAY
3. WORRYING TOO MUCH ABOUT DIFFERENT THINGS: NEARLY EVERY DAY
1. FEELING NERVOUS, ANXIOUS, OR ON EDGE: NEARLY EVERY DAY
GAD7 TOTAL SCORE: 19
5. BEING SO RESTLESS THAT IT IS HARD TO SIT STILL: NEARLY EVERY DAY
4. TROUBLE RELAXING: NEARLY EVERY DAY

## 2024-06-26 NOTE — PROGRESS NOTES
"[facility]     Case Management Encounter: Writer met with patient to discuss aftercare programs. Patient said he is interested in residential treatment for 30-45 days. Patient reports he has been to [facility] and [facility] in the past and is not interested in returning to those programs. Patient filled out paperwork for updated CD assessment. Patient will likely owe his deductible or out of pocket max on his insurance to attend residential program.     Insurance: [employer] - under his wife's employer     Legal Status: vol     SUDs Assessment Status: Needs update - last assessment 8/2023 by Hortencia Magana (patient turned in his paperwork)    ROIs on file: Wife - Rebekah Foster: [phone]     Living Situation: Patient reports he lives with his wife     Current Providers, Supports & Collateral:  Family, wife (patient asked writer to contact wife)    Collateral call with Patient's wife Rebekah [phone] :  *Writer spoke to patient's wife Rebekah. Rebekah said that she told patient he is unable to return to the home, and wants him to go to residential treatment. Wife stated she is not willing to pay for treatment (patient is on his wife's insurance plan and will likely owe deductible and out of pocket max for residential programming). His Wife states patient has about $700 of his own money. \"He needs to make his own plan now, and figure out how he is going to pay for it himself\".     Current Plan/Referral Status: Patient will be referred to residential CD treatment covered by his insurance once his CD assessment is updated. Writer and patient are looking at program options.     RN updated.    Sallie Ulloa  [address] - Adult Inpatient Addiction Psychiatry Unit           "

## 2024-06-26 NOTE — PROGRESS NOTES
"  Unit 3A    UNIVERSAL ADULT DIAGNOSTIC ASSESSMENT - Substance Use Disorder    Provider Name and Credentials: ELIGIO Hutchinson    PATIENT'S NAME: Monty Cox  PREFERRED NAME: Ino   PRONOUNS:       MRN: [id number]  : 1965   Last 4 SSN: [id number]  ACCT. NUMBER:  [id number]  DATE OF SERVICE: 2024   START TIME: 2:00 pm  END TIME: 3:00 pm   PREFERRED PHONE: [phone]  EMAIL: [email or web address]   May we leave a program related message: Yes  SERVICE MODALITY:  In-person      Identifying Information:  Patient is a 58 year old,  male who was referred for an assessment by self. The pronoun use throughout this assessment reflects the patient's chosen pronoun. Patient attended the session alone.     Chief Complaint:   The reason for seeking services at this time is: \"Get sober\"  The problem(s) began 23. Patient has attempted to resolve these concerns in the past through [facility] treatment and detox .  Patient is in active withdrawal, but is currently admitted to [facility] for medical detoxification and withdrawal monitoring and is not an imminent safety risk to self or others, and may proceed with the assessment interview    Social/Family History:  Patient reported he grew up in [place]. Patient was raised by his mother. Patient reported that his childhood was pretty stable.  Patient describes current relationships with family of origin as fair.      The patient describes his cultural background as .  Cultural influences and impact on patient's life structure, values, norms, and healthcare:  None reported .  Contextual influences on patient's health include: Contextual Factors: Individual Factors [facility] .  Patient identified his preferred language to be English. Patient reported he does not need the assistance of an  or other support involved in therapy.     Patient reported had no significant delays in developmental tasks.  Patient's highest education " "level was some college. Patient identified the following learning problems: none reported.  Patient reports he is able to understand written materials.    Patient's current relationship status is  for 14 years.   Patient identified his sexual orientation as straight.  Patient reported having zero child(isidro).     Patient's current living/housing situation involves staying in own home/apartment.  Patient lives with his wife and he reports that housing is not stable. Patient identified parents as part of his support system.  Patient identified the quality of these relationships as fair.      Patient reports he is involved in community of [place] activities. Patients reports spirituality impacts his recovery in the following ways:  \"It helps a lot.\"     Patient reports engaging in the following recreational/leisure activities: Pt reported none.. Patient reports engaging in the following recreation/leisure activities while using: pt reported none. Patient reports the following people are supportive of recovery: parents.  Patient is currently employed full time and reports they are able to function appropriately at work..  Patient reports his income is obtained through employment, family, and spouse.  Patient does identify finances as a current stressor. Cultural and socioeconomic factors do not affect the patient's access to services.    Patient denies substance related arrests or legal issues.  Patient denies being on probation / parole / under the jurisdiction of the court.    Patient's Strengths and Limitations:  Patient identified the following strengths or resources that will help him succeed in treatment: determination. Things that may interfere with the patient's success in treatment include: housing instability.     Assessments:  The following assessments were completed by patient for this visit:  GAD7:       3/22/2021     1:00 PM 8/8/2023     7:00 AM 6/26/2024     2:00 PM   PILI-7 SCORE   Total Score 10 16 " 19     PROMIS 10-Global Health (all questions and answers displayed):       8/8/2023     7:00 AM 6/26/2024     2:00 PM   PROMIS 10   In general, would you say your health is: 2 2   In general, would you say your quality of life is: 1 1   In general, how would you rate your physical health? 2 2   In general, how would you rate your mental health, including your mood and your ability to think? 1 1   In general, how would you rate your satisfaction with your social activities and relationships? 1 1   In general, please rate how well you carry out your usual social activities and roles. (This includes activities at home, at work and in your community, and responsibilities as a parent, child, spouse, employee, friend, etc.) 1 3   To what extent are you able to carry out your everyday physical activities such as walking, climbing stairs, carrying groceries, or moving a chair? 4 5   In the past 7 days, how often have you been bothered by emotional problems such as feeling anxious, depressed, or irritable? 5 5   In the past 7 days, how would you rate your fatigue on average? 3 3   In the past 7 days, how would you rate your pain on average, where 0 means no pain, and 10 means worst imaginable pain? 6 8   Global Mental Health Score 4 4   Global Physical Health Score 12 12   PROMIS TOTAL - SUBSCORES 16 16     GAIN-sliding scale:      8/8/2023     7:00 AM 6/26/2024     2:00 PM   When was the last time that you had significant problems...   with feeling very trapped, lonely, sad, blue, depressed or hopeless about the future? Past month Past month   with sleep trouble, such as bad dreams, sleeping restlessly, or falling asleep during the day? Past Month Past Month   with feeling very anxious, nervous, tense, scared, panicked or like something bad was going to happen? Past month Past month   with becoming very distressed & upset when something reminded you of the past? 2 to 12 months ago 2 to 12 months ago   with thinking about  "ending your life or committing suicide? Never Never          8/8/2023     7:00 AM 6/26/2024     2:00 PM   When was the last time that you did the following things 2 or more times?   Lied or conned to get things you wanted or to avoid having to do something? 2 to 12 months ago Past month   Had a hard time paying attention at school, work or home? Past month 2 to 12 months ago   Had a hard time listening to instructions at school, work or home? 2 to 12 months ago Never   Were a bully or threatened other people? Never Never   Started physical fights with other people? Never Never       Personal and Family Medical History:   Patient did not report a family history of mental health concerns.  Patient reports the following family history: NA  Family History   Problem Relation Age of Onset    Substance Abuse Father     Substance Abuse Nephew         Patient reported the following previous mental health diagnoses: anxiety and depression.  Patient reports his primary mental health symptoms include: \"I internalize.\" everything and these do not impact his ability to function.   Patient has received mental health services in the past: Therapy.  Psychiatric Hospitalizations: None.  Patient denies a history of civil commitment.  Current mental health services/providers include:  [facility].    Patient has had a physical exam to rule out medical causes for current symptoms.  Date of last physical exam was within the past year. Client was encouraged to follow up with PCP if symptoms were to develop. The patient  has access to medical services if needed. . Patient reports no current medical concerns.  Patient denies any issues with pain.   There are significant appetite / nutritional concerns / weight changes. Patient does not report a history of an eating disorder. Patient does not report a history of head injury / trauma / cognitive impairment.    Patient reports current meds as:   Current Facility-Administered Medications "   Medication Dose Route Frequency Provider Last Rate Last Admin    acetaminophen (TYLENOL) tablet 650 mg  650 mg Oral Q4H PRN Kera Chavis APRN CNP   650 mg at 06/26/24 1211    alum & mag hydroxide-simethicone (MAALOX) suspension 30 mL  30 mL Oral Q4H PRN Kera Chavis, BINDU JOHNSON        atenolol (TENORMIN) tablet 50 mg  50 mg Oral Daily PRN Kera Chavis APRN CNP        atorvastatin (LIPITOR) tablet 20 mg  20 mg Oral Daily Shannon Selby PA-C        diazepam (VALIUM) tablet 5-20 mg  5-20 mg Oral Q30 Min PRN Kera Chavis APRN CNP   10 mg at 06/26/24 1211    escitalopram (LEXAPRO) tablet 10 mg  10 mg Oral Daily Jonny Cloud APRN CNP   10 mg at 06/26/24 1211    folic acid (FOLVITE) tablet 1 mg  1 mg Oral Daily Kera Chavis APRN CNP        gabapentin (NEURONTIN) capsule 300 mg  300 mg Oral TID Jonny Cloud APRN CNP   300 mg at 06/26/24 1211    hydrOXYzine HCl (ATARAX) tablet 25 mg  25 mg Oral Q4H PRN Kera Chavis APRN CNP   25 mg at 06/26/24 0721    loperamide (IMODIUM) capsule 2 mg  2 mg Oral 4x Daily PRN Kera Chavis APRN CNP        multivitamin w/minerals (THERA-VIT-M) tablet 1 tablet  1 tablet Oral Daily Kera Chavis APRN CNP        OLANZapine (zyPREXA) tablet 10 mg  10 mg Oral TID PRN Kera Chavis APRN CNP        Or    OLANZapine (zyPREXA) injection 10 mg  10 mg Intramuscular TID PRN Kera Chavis APRN CNP        ondansetron (ZOFRAN) tablet 4 mg  4 mg Oral Q6H PRN Kera Chavis APRN CNP   4 mg at 06/26/24 0823    senna-docusate (SENOKOT-S/PERICOLACE) 8.6-50 MG per tablet 1 tablet  1 tablet Oral BID PRN Kera Chavis APRN CNP        thiamine (B-1) tablet 100 mg  100 mg Oral Daily Kera Chavis APRN CNP        traZODone (DESYREL) tablet 100 mg  100 mg Oral At Bedtime Jonny Cloud APRN CNP           Medication Adherence:  Patient reports taking prescribed medications as prescribed.    Patient Allergies:  No Known  Allergies    Medical History:  No past medical history on file.    Substance Use:  Patient reported the following biological family members or relatives with chemical health issues:  father and nephew.. Patient has received substance use disorder and/or gambling treatment in the past.  Patient reports the following dates and locations of treatment services:  [facility] . Patient has been to detox. Patient is not currently receiving any chemical dependency treatment. Patient reports they have attended the following support groups: AA in the past.        Substance Age of first use Pattern and duration of use (include amounts and frequency) Date of last use     Withdrawal potential Route of administration   Has  used Alcohol 17 Daily drinking 1 pint 06/25/2024 No oral   Has not used Marijuana            Has not used Amphetamines          Has not used Cocaine/ crack           Has not used Hallucinogens        Has not used Inhalants        Has not used Heroin        Has not used Other Opiates        Has not used Benzodiazepine          Has not used Barbiturates        Has not used Over the counter meds.        Has not used Caffeine        Has not used Nicotine         Has not used other substances not listed above:  Identify:              Patient reported the following problems as a result of their substance use: family problems and financial problems.  Patient is concerned about substance use.     Patient reports experiencing the following withdrawal symptoms within the past 12 months: none and the following within the past 30 days: sweating, shaky/jittery/tremors, unable to sleep, agitation, fatigue, sad/depressed feeling, sensitivity to noise, nausea/vomiting, dizziness, diminished appetite, unable to eat, and anxiety/worry.   Patients reports urges to use Alcohol.  Patient reports he has used more Alcohol than intended and over a longer period of time than intended. Patient reports he has had unsuccessful  "attempts to cut down or control use of Alcohol.  Patient reports longest period of abstinence was 7 years and return to use was due to \"stress.\" Patient reports he has not needed to use more Alcohol to achieve the same effect.  Patient does not report diminished effect with use of same amount of Alcohol.     Patient does  report a great deal of time is spent in activities necessary to obtain, use, or recover from Alcohol effects.  Patient does  report important social, occupational, or recreational activities are given up or reduced because of Alcohol use.  Alcohol use is continued despite knowledge of having a persistent or recurrent physical or psychological problem that is likely to have caused or exacerbated by use.  Patient reports the following problem behaviors while under the influence of substances Pt reported no problematic behaviors. .     Patient reports his recovery goals are \"Abstinence.\"    Patient reports substance use has not impacted his ability to function in a school setting. Patient reports substance use has impacted his ability to function in a work setting.  Patients demographics and history impact his recovery in the following ways:  NA.     Patient does not have a history of gambling concerns and/or treatment . Patient does not have other addictive behaviors he is concerned about .         Significant Losses / Trauma / Abuse / Neglect Issues:   Patient did not serve in the .  There are indications or report of significant loss, trauma, abuse or neglect issues related to: death of mother in October of 2023 .  Concerns for possible neglect are not present.     Safety Assessment:   Current Safety Concerns:  [place] Suicide Severity Rating Scale (Short Version)      3/16/2023    11:31 AM 3/16/2023     7:00 PM 8/8/2023     3:54 PM 8/8/2023    11:00 PM 8/25/2023     1:54 PM 6/25/2024     9:41 AM 6/25/2024    10:00 PM   [place] Suicide Severity Rating (Short Version)   Over the past 2 " weeks have you felt down, depressed, or hopeless? no  no  yes     Over the past 2 weeks have you had thoughts of killing yourself? no  no  no     Have you ever attempted to kill yourself? no  no  no     Q1 Wished to be Dead (Past Month)  no  no  0-->no 0-->no   Q2 Suicidal Thoughts (Past Month)  no  no  0-->no 0-->no   Q3 Suicidal Thought Method  no  no      Q4 Suicidal Intent without Specific Plan  no  no      Q5 Suicide Intent with Specific Plan  no  no      Q6 Suicide Behavior (Lifetime)  no  no  0-->no 0-->no   Level of Risk per Screen  low risk  low risk  no risks indicated no risks indicated   Required Interventions Room made safe;Patient searched           Patient denies current homicidal ideation and behaviors.  Patient denies current self-injurious ideation and behaviors.    Patient denied risk behaviors associated with substance use.  Patient denies any high risk behaviors associated with mental health symptoms.  Patient reports the following current concerns for their personal safety: None.  Patient reports there are not firearms in the house. There are no firearms in the home..     History of Safety Concerns:  Patient denied a history of homicidal ideation.     Patient denied a history of personal safety concerns.    Patient denied a history of assaultive behaviors.    Patient denied a history of sexual assault behaviors.     Patient denied a history of risk behaviors associated with substance use.  Patient denies any history of high risk behaviors associated with mental health symptoms.  Patient reports the following protective factors: spirituality, purpose  , living with other people, and committment to well-being    Risk Plan:  See Recommendations for Safety and Risk Management Plan    Review of Symptoms per patient report:  Substance Use:  vomiting, other substance related medical issue  , daily use, substance use at work, work absence due to substance use, family relationship problems due to  substance use, social problems related to substance use, and cravings/urges to use     Collateral Contact Summary:   Collateral contacts contributing to this assessment:  NA    If court related records were reviewed, summarize here: NA      Information in this assessment was obtained from the medical record and provided by patient who is a good historian.    Patient will have open access to their mental health medical record.    Diagnostic Criteria: 1.) Alcohol/drug is often taken in larger amounts or over a longer period than was intended.  Met for Alcohol.  2.) There is a persistent desire or unsuccessful efforts to cut down or control alcohol/drug use.  Met for Alcohol.  3.) A great deal of time is spent in activities necessary to obtain alcohol, use alcohol, or recover from its effects.  Met for Alcohol.  4.) Craving, or a strong desire or urge to use alcohol/drug.  Met for Alcohol.  5.) Recurrent alcohol/drug use resulting in a failure to fulfill major role obligations at work, school or home.  Met for Alcohol.  6.) Continued alcohol use despite having persistent or recurrent social or interpersonal problems caused or exacerbated by the effects of alcohol/drug.  Met for Alcohol.  7.) Important social, occupational, or recreational activities are given up or reduced because of alcohol/drug use.  Met for Alcohol.  11.) Withdrawal, as manifested by either of the following: The characteristic withdrawal syndrome for alcohol/drug (refer to Criteria A and B of the criteria set for alcohol/drug withdrawal).. Met for Alcohol.     As evidenced by self report and criteria, client meets the following DSM5 Diagnoses:   (Sustained by DSM5 Criteria Listed Above)  Alcohol Use Disorder   303.90 (F10.20) Severe In a controlled environment.    Recommendations:     1. Plan for Safety and Risk Management:  Recommended that patient call 911 or go to the local ED should there be a change in any of these risk factors..      Report to  child / adult protection services was NA.     2. [facility] Referrals:   Recommendations:  Residential Treatment.  Patient reports they are willing to follow these recommendations.     Patient would like the following family or other support people involved in their treatment: Pt declined. Patient does not have a history of opiate use.    3. Mental Health Referrals:  Pt reports he has MH services in the community.      4. Patient identified no cultural concerns that need to be addressed in treatment.NA    5. Recommendations for treatment focus:   Depressed Mood -    Anxiety -    Grief / Loss -    Alcohol / Substance Use -   .     Clinical Substantiation:  Summary: Discussed with pt their desired outcome; reviewed living situation and community supports; reviewed type of use and risk factors for continued use. Risk ratings/justification below:   Dimension 1 -  Acute Intoxication/Withdrawal: 0 - No Problem    Dimension 2 - Biomedical: 1 - Minor Problem    Dimension 3 - Emotional/Behavioral/Cognitive Conditions: 1 - Minor Problem    Dimension 4 - Readiness to Change:  2 - Moderate Problem    Dimension 5 - Relapse/Continued Use/ Continued Problem Potential: 4 - Extreme Problem    Dimension 6 - Recovery Environment:  4 - Extreme Problem      Placement/Program/Barriers Identified: None    Referral: TBD    Provider Name/ Credentials:  ELIGIO Hutchinson  June 26, 2024

## 2024-06-26 NOTE — CONSULTS
[facility]  Consult Note - Hospitalist Service  Date of Admission:  6/25/2024  Consult Requested by: Kera Chavis CNP  Reason for Consult: 'detox'     Assessment & Plan   Monty Cox is a 59 yo M with pmhx of depression, anxiety, HLD, gout, CKD III who was admitted on 6/25/2024 to station 3A for alcohol withdrawal.     Alcohol Withdrawal  AUD  Hx of Dts  Hx of withdrawal seizures   Drinking about 1 pint of liquor daily for last several months. Pt reports history of Dts and withdrawal seizures ~20 years ago. Symptoms c/w previous episodes of withdrawal.   - Management per psychiatry primary  - MV/ folate/ thiamine  - Seizure precautions  - Monitor for signs of DT  - MSSA protocol     PINEDA (improving) on CKD III  AGMA (improving)  Scr on admission 2.31--> 2.09. Appears baseline ~1.7-1.8 in our system though patient reports he gets the majority of his care elsewhere and his baseline is typically closer to 2.1. No urinary symptoms, pt reports normal volumes of urination. Bicarb 18 on admission, improved to 20 with IVF. AG 22 on admission, improved to 17 with IVF. No localizing infectious symptoms. Suspect PINEDA prerenal 2/2 dehydration iso above, reassuring that it is improving with IVF.   - Encourage PO  - Repeat BMP ordered for 6/27     Transaminitis  , ALT 79. Remainder of LFTs wnl. Pt without abdominal pain. Suspect 2/2 etoh given history and pattern of AST > 2x ALT.   - PCP follow-up to recheck     Thrombocytopenia  Plts 118 on arrival, suspect likely 2/2 BM suppression iso above. No e/o bleeding on exam.  - PCP follow-up to recheck    Normocytic Anemia  Hgb 11.0 on admission, baseline appears to be closer to ~7s-8s. Likely that patient is hemo concentrated which is contributing to the increase from baseline. No e/o bleeding on exam, patient denies noticing any bleeding.  - Follow-up with PCP    Prediabetes  HLD  A1c 5.7%, 3/2023.   - Cont pta atorvastatin  -  "Follow-up with PCP    Gout   No e/o acute exacerbation. No longer taking allopurinol.     Depression  Anxiety  PTA on gabapentin and lexapro.   - Management per psychiatry primary     Cannabis Use  UDS + for Cannabinoids  - Management per psychiatry primary     The medicine consult team will follow for results of above labs. Please do not hesitate to reach out with any further questions or concerns. Thank you for allowing us to be involved in this patients care.      The patient's care was discussed with the Bedside Nurse and Patient.    Clinically Significant Risk Factors Present on Admission          # Hypocalcemia: Lowest Ca = 8.1 mg/dL in last 2 days, will monitor and replace as appropriate    # Anion Gap Metabolic Acidosis: Highest Anion Gap = 22 mmol/L in last 2 days, will monitor and treat as appropriate    # Thrombocytopenia: Lowest platelets = 118 in last 2 days, will monitor for bleeding                          Shannon Selby PA-C  Hospitalist Service  Securely message with [employer] info)  Text page via [facility] Paging/Directory   ______________________________________________________________________    Chief Complaint   Alcohol Withdrawal     History is obtained from the patient    History of Present Illness   Monty Cox is a 59 yo M with pmhx of depression, anxiety, HLD, gout, CKD III who was admitted on 6/25/2024 to station 3A for alcohol withdrawal.     Pt reports he still feels as though he is withdrawing this morning, feels highly anxious and a bit \"wobbly\" which is similar to previous withdrawals. 20 years ago had Dts, also had withdrawal seizures. Was drinking about a pint of vodka a day prior to coming in, for several months. Reports today he feels he is eating a little better and drinking much better. He is urinating normally, denies any blood in his urine. He does get gout attacks every once an a while, does not currently feel he has one. Otherwise denies complaints or concerns " currently.     Denies fevers, chills, rhinorrhea, sore, throat, CP, SOB, abdominal pain, dysuria, diarrhea, LE edema.     Discussed with bedside RN who had no acute concerns.    Past Medical History    No past medical history on file.    Past Surgical History   No past surgical history on file.    Medications   Discussed home medications with patient he takes atorvastatin, lexapro, gabpentin on a daily basis.            Physical Exam   Vital Signs: Temp: 97.6  F (36.4  C) Temp src: Temporal BP: 138/72 Pulse: 78   Resp: 16 SpO2: 97 % O2 Device: None (Room air)    Weight: 179 lbs 4.8 oz  Constitutional: sitting up in chair, walking around unit, appears comfortable, no apparent distress.  HEENT: Mucous membranes moist, no scleral icterus   Respiratory: No increased work of breathing, clear to auscultation bilaterally.  Cardiovascular: regular rate, regular rhythm, extremities well perfused   GI: abdomen soft, non tender, non-distended.  Skin: normal skin color, texture, and no jaundice  Musculoskeletal: moving all extremities voluntarily during exam  Neuro: awake, alert, answering all questions appropriately during exam    Medical Decision Making       50 MINUTES SPENT BY ME on the date of service doing chart review, history, exam, documentation & further activities per the note.      Data     I have personally reviewed the following data over the past 24 hrs:    4.0  \   11.0 (L)   / 118 (L)     136 99 22.2 (H) /  149 (H)   3.6 20 (L) 2.09 (H) \     ALT: 79 (H) AST: 201 (H) AP: 116 TBILI: 0.4   ALB: 4.5 TOT PROTEIN: 8.5 (H) LIPASE: N/A

## 2024-06-26 NOTE — ED NOTES
06/25/24 1945   Modified Selective Severity Assessment (MSSA)   Eating Disturbances 3   Tremor 2   Sleep Disturbance 1   Clouding of Sensorium 0   Hallucinations 0   Quality of Contact 0   Agitation 0   Paroxysmal Sweats 1   Temperature 1   Pulse 4   Total MSSA Score 12

## 2024-06-26 NOTE — PROGRESS NOTES
Behavioral Team Discussion: (6/26/2024)    Continued Stay Criteria/Rationale: Patient admitted for Chemical Use Issues.  Plan: The following services will be provided to the patient; psychiatric assessment, medication management, therapeutic milieu, individual and group support, and skills groups.   Participants: 3A Provider: Dr. Arti Lowe MD; 3A RN: Sb Sousa RN; 3A CM's:  Zuri Costa .  Summary/Recommendation: Providers will assess today for treatment recommendations, discharge planning, and aftercare plans. CM will meet with pt for discharge planning.   Medical/Physical: past medical history of alcohol abuse, cataracts, anxiety, pancreatitis, gout and prediabetes.  Precautions:   Behavioral Orders   Procedures    Code 1 - Restrict to Unit    Fall precautions    Routine Programming     As clinically indicated    Seizure precautions    Seizure precautions    Status 15     Every 15 minutes.    Withdrawal precautions     Rationale for change in precautions or plan: N/A  Progress: Initial.    ASAM Dimension Scale Ratings:  Dimension 1: 1 Client can tolerate and cope with withdrawal discomfort. The client displays mild to moderate intoxication or signs and symptoms interfering with daily functioning but does not immediately endanger self or others. Client poses minimal risk of severe withdrawal.  Dimension 2: 1 Client tolerates and adriana with physical discomfort and is able to get the services that the client needs.  Dimension 3: 2 Client has difficulty with impulse control and lacks coping skills. Client has thoughts of suicide or harm to others without means; however, the thoughts may interfere with participation in some treatment activities. Client has difficulty functioning in significant life areas. Client has moderate symptoms of emotional, behavioral, or cognitive problems. Client is able to participate in most treatment activities.  Dimension 4: 2 Client displays verbal compliance, but lacks  consistent behaviors; has low motivation for change; and is passively involved in treatment.  Dimension 5: 4 No awareness of the negative impact of mental health problems or substance abuse. No coping skills to arrest mental health or addiction illnesses, or prevent relapse.  Dimension 6: 4 Client has (A) Chronically antagonistic significant other, living environment, family, peer group or long-term criminal justice involvement that is harmful to recovery or treatment progress, or (B) Client has an actively antagonistic significant other, family, work, or living environment with immediate threat to the client's safety and well-being.
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
## 2024-06-26 NOTE — PROGRESS NOTES
06/25/24 2908   Patient Belongings   Did you bring any home meds/supplements to the hospital?  No   Patient Belongings other (see comments)   Patient Belongings Put in Hospital Secure Location (Security or Locker, etc.) other (see comments)   Belongings Search Yes   Clothing Search Yes   Second Staff tru     Storage bin: shoes, pants with string, 2 hats, windbreaker jacket, canvas backpack,  cords, notebook computer, Ziploc with set of keys and loose change    Box in med room: wallet, phone    Security env # [id number]: goto card, visa card, mastercard    A               ADMISSION:    I am responsible for any personal items that are not sent to the safe or pharmacy. [facility] is not responsible for loss, theft or damage of any property in my possession.    Patient Signature _________________________________________ Date/Time _____________________    Staff Signature ___________________________________________ Date/Time _____________________    2nd Staff person, if patient is unable/unwilling to sign    ________________________________________________________ Date/Time _____________________    DISCHARGE:    All personal items have been returned to me.    Patient Signature _________________________________________ Date/Time _____________________    Staff Signature ___________________________________________ Date/Time _____________________

## 2024-06-26 NOTE — TELEPHONE ENCOUNTER
R: 7:25 PM Received call from JEWEL Jones on 3[facility].  Karen stated pt was escalated to on call provider who spoke to Dr Austin.  Dr Austin, oncall provider Radha and JEWEL are in agreement that pt's creatinine is still high and pt should remain in the ED to be assessed in the morning by Dr Lowe.     7:30 PM ED notified.      R: Received call from Dr Kearney requesting doc to doc with on call provider.    7:45 PM Paged provider Radha    Doc to doc occurred.  ED provider stated pt's creatinine level is not far off from baseline and is trending down.  By Dr Kearney's medical standards, pt is medically cleared and appropriate for detox.  Provider Radha stated she would speak to Dr Austin and get in touch with intake    8:00 PM received call from provider Radha stating she spoke to [facility] and they will take pt this evening.    ED updated

## 2024-06-26 NOTE — PLAN OF CARE
Rehab Group    Start time: 1640  End time: 1730  Patient time total: 45 minutes    attended full group    #5 attended   Group Type: occupational therapy   Group Topic Covered: cognitive activities, healthy leisure time, problem solving, and social skills   Group Session Detail:Patient engaged in a leisure activity with a visuospatial and cognitive component in order to promote: problem solving skills, improve attention, emphasize new learning, exercise cognitive skills, and foster leisure and healthy distraction   Patient Response/Contribution:  cooperative with task, socially appropriate, and actively engaged   Patient Detail:pt shared he enjoys working with his hands for his cognitive wellness. Pt polite and pleasant during interactions with staff and peers. Pt was independent with task following initial instructions and demonstration. Pt readily shared his strategy for searching for objects during activity. Pt remained focused for duration. Pt endorsed positive response to activity            Patient Active Problem List   Diagnosis    Age-related cataract of both eyes    PINEDA (acute kidney injury) (H24)    Alcohol withdrawal syndrome with complication (H)    Anxiety disorder    Alcohol-induced acute pancreatitis, unspecified complication status    Bandemia    Finger stiffness    Gout    Hyperopia of both eyes with astigmatism and presbyopia    Metabolic acidosis    Moderate episode of recurrent major depressive disorder (H)    Polyarticular gout    Prediabetes    Alcohol use disorder, severe, dependence (H)    Alcohol dependence with uncomplicated intoxication (H)    Alcohol abuse    Acute kidney injury (H24)

## 2024-06-26 NOTE — DISCHARGE INSTRUCTIONS
Behavioral Discharge Planning and Instructions  THANK YOU FOR CHOOSING [address]  [phone]    Summary: You were admitted to [facility] on 6/25/24 for detoxification from alcohol.  A medical exam was performed that included lab work. You have met with a  and opted to attend residential CD treatment at [facility].  Please take care and make your recovery a daily priority, Monty!  It was a pleasure working with you and the entire treatment team here wishes you the very best in your recovery!     Recommendation:  Residential CD treatment at [facility] Complete program and follow all aftercare recommendations. Abstain from using mood altering substances.    Your kidney function was a bit elevated when you first arrived and so were your liver function tests, we recommend getting a comprehensive metabolic panel checked with your primary care provider within a week of discharge to make sure your labs are stable and improving.     Main Diagnoses:  Per Dr. Mark Austin MD;  303.90 (F10.20) Alcohol Use Disorder Severe    Major Treatments, Procedures and Findings:  You were treated for alcohol detoxification using Valium. You completed an updated chemical dependency assessment. You had labs drawn and those results were reviewed with you. Please take a copy of your lab work with you to your next primary care provider appointment.    Symptoms to Report:  If you experience more anxiety, confusion, sleeplessness, deep sadness or thoughts of suicide, notify your treatment team or notify your primary care provider. IF ANY OF THE SYMPTOMS YOU ARE EXPERIENCING ARE A MEDICAL EMERGENCY CALL 911 IMMEDIATELY.     Lifestyle Adjustment: Adjust your lifestyle to get enough sleep, relaxation, exercise and good nutrition. Continue to develop healthy coping skills to decrease stress and promote a sober living environment. Do not use mood altering substances including alcohol, illegal drugs or addictive  medications other than what is currently prescribed.     Disposition: Residential CD Treatment  [facility]: [facility]  [address] [place] [address]   [phone]  [facility] Intake (for transportation): [phone]    Facts about COVID19 at www.cdc.gov/COVID19 and www.[place].gov/covid19    Keeping hands clean is one of the most important steps we can take to avoid getting sick and spreading germs to others.  Please wash your hands frequently and lather with soap for at least 20 seconds!    Follow-up Appointment:     [facility]   [address] [address]   [phone]  Jeramie Altamirano APRN, [facility]      Appointment Date/Time: Friday, July 26, 2024 at 2:30 pm        Residential CD Treatment:  [facility]: [address] [address]   [phone]  [facility] Intake (for transportation): [phone]      Recovery apps for your phone for educational purposes and to locate in person and zoom recovery meetings  Everything AA - educational purpose and bobby is a great resource  12 Step Toolkit - educational purpose learning about the 12 steps to recovery  [place] - meeting bobby  AA  - meeting bobby  Meeting guide - meeting bobby  Quick NA meeting - meeting bobby  Neva- has various apps    Resources:  Some AA/NA meetings are being held online however most have returned to in-person or a hybrid combination please check online to verify*  Need Support Now? If you or someone you know is struggling or in crisis, help is available. Call or text reBounces or chat Blyk.org  AA meetings search for them at: https://aa-intergroup.org (worldwide meeting listings)  AA meetings for [place] area can be found online at: https://a[place].org (click local online meetings listings)  NA meetings for [place] area can be found online at:  "https://www.nam[place].org  (click find a meeting)  AA and NA Sponsors are excellent resources for support and you can find one at any support group meeting.   Alcoholics Anonymous (https://aa.org/): for information 24 hours/day  AA Intergroup service office in [place] (http://www.aastpaul.org/) [phone]  AA Intergroup service office in [facility]: [phone]. (http://www.a[place].org/)  Narcotics Anonymous (www.nam[place].org) (423) 728-6150  https://aafairviewriverside.org/meetings  SMART Recovery - self management for addiction recovery:  www.TameccorecAgilis Biotherapeuticsy.org  Pathways ~ A Health Crisis Resource & Support Center:  154.225.6524.  https://prescribetoprevent.org/patient-education/videos/  http://www.harmreduction.org  Crisis Text Line  Text 333773  You will be connected with a trained live crisis counselor to provide support. Por espanol, texto  KIMBERLEY a 162603 o texto a 442-AYUDAME en WhatsApp  [facility] Line  1.595.SUICIDE [4821357]  [facility] [phone]  Support Group:  AA/NA and Sponsor/support.  Fast Tracker  Linking people to mental health and substance use disorder resources  Screaming [employer].org   [facility] Warm Line  Peer to peer support  Monday thru Saturday, 12 pm to 10 pm  602.849.7224 or 2.411.672.9272  Text \"Support\" to 12552  National [place] on Mental Illness (HERACLIO)  387.977.2030 or 1.888.HERACLIO.HELPS  Alcoholics Anonymous (www.alcoholics-anonymous.org): Check your phone book for your local chapter.  Suicide Awareness Voices of Education (SAVE) (www.save.org): 187-603-ZACD (1436)  National Suicide Prevention Line (www.mentalhealthmn.org): 008-752-IXPL (6184)  Mental Health Consumer/Survivor Network of [place] (www.mhcsn.net): 209.404.1895 or 618-074-0746  Mental Health Association of [place] (www.mentalhealth.org): 803-215-9619 or 005-797-8802   Substance Abuse and Mental Health Services (www.samhsa.gov)  [place] Opioid Prevention Coalition: " www.opioidcoalition.org    [place] Recovery Connection ([facility])  [facility] connects people seeking recovery to resources that help foster and sustain long-term recovery.  Whether you are seeking resources for treatment, transportation, housing, job training, education, health care or other pathways to recovery, [facility] is a great place to start.  950.995.5590.  www.minnesotarecAdmify.[employer]    Great Pod casts for nutrition and wellness  Listen on Apple Podcasts  Dishing Up Nutrition   [employer]   Nutrition       Understand the connection between what you eat and how you feel. Hosted by licensed nutritionists and dietitians from [employer] we share practical, real-life solutions for healthier living through nutrition.     General Medication Instructions:   See your medication sheet(s) for instructions.   Take all medications as prescribed.  Make no changes unless your primary care provider suggests them.   Go to all your primary care provider visits.  Be sure to have all your required lab tests. This way, your medicines can be refilled on time.  Do not use any forms of alcohol.    Please Note:  If you have any questions at anytime after you are discharged please call [facility] detox unit 3[facility] at [phone].  [facility], Behavioral Intake [phone]  Medical Records call [phone]  Outpatient Behavioral Intake call [phone]  LP+ Wait List/Bed Availability call [phone]    Please remember to take all of your behavioral discharge planning and lab paperwork to any follow up appointments, it contains your lab results, diagnosis, medication list and discharge recommendations.      THANK YOU FOR CHOOSING [facility]

## 2024-06-26 NOTE — PLAN OF CARE
Problem: Alcohol Withdrawal  Goal: Alcohol Withdrawal Symptom Control  Outcome: Progressing   Goal Outcome Evaluation: ongoing    S-(situation): Pt is a 58 y.o. male admitted to 3A from the ED for alcohol detox.  Pt reports drinking 1 pint of vodka daily for ~2 weeks.  BAILEY 0.267 in ED.  Utox positive for cannabis.  Creatinine high in ED, however came down since this morning (2.31 in AM and 2.09 at 1642)--ED Dr discussed w/ our Dr regarding pt's high creatinine and d/t ongoing renal disease, it was decided pt able to be admitted to .    B-(background):   -Pt endorses hx of w/d seizures, states the last one was ~1 yr ago  -Pt reports hx DT's approximately 25 yrs ago  -Hx: renal disease--pt reports he was on dialysis for 2-3 yrs, stating this was d/t drinking.  Hx alcohol-induced pancreatitis, cataracts, MDD, anxiety, gout, prediabetes (states he occasionally checks BG at home)  -Pt reports he's been to detox 4-5 times.  Denies any past IP MHCD, states a few past outpt MHCD and 5 past residential CD treatments    A-(assessment):   -MSSA 9 at 2211, pt received Valium 10mg  -Blood sugar 149 at 2217  -Pt denied pain  -Rated his anxiety 9-10/10, depression 8-9/10  -Denied A/VH's  -Denied SI/SIB/HI (pt denied any hx SI and denied any past suicide attempts)  -Pt received PRN trazodone at HS  -Pt ambulates with slow gait, however gait is steady  -Pt denied any skin concerns/open areas, none noted  -Pt states he would like to go to Inpt treatment after detox  -Pt polite, cooperative and behaviorally controlled    @2151: BP (!) 158/91   Pulse 105   Temp 97.6  F (36.4  C) (Temporal)   Resp 16   Ht 1.829 m (6')   Wt 81.3 kg (179 lb 4.8 oz)   SpO2 99%   BMI 24.32 kg/m      R-(recommendations): Pt on MSSA with Valium.  Pt placed on fall, seizure & w/d precautions.  IM consult order in place.  Status 15.  Will continue to monitor and support plan of care.

## 2024-06-26 NOTE — PLAN OF CARE
Problem: Alcohol Withdrawal  Goal: Alcohol Withdrawal Symptom Control  Outcome: Progressing     Problem: Sleep Disturbance  Goal: Adequate Sleep/Rest  Outcome: Progressing   Goal Outcome Evaluation:    The Patient's MSSA scores were 5 and 4. He received no medication for withdrawal symptoms. He slept okay, and the Team conducted safety checks every 15 minutes and noted no safety issues. He received Vistaril at 0725 for anxiety

## 2024-06-26 NOTE — PROGRESS NOTES
Writer discussed residential programs with patient and sent his insurance information to [facility] (several men's residential locations) to see what his out of pocket costs would be. Official requires CD assessment and will be sent tomorrow

## 2024-06-27 LAB
ANION GAP SERPL CALCULATED.3IONS-SCNC: 14 MMOL/L (ref 7–15)
BUN SERPL-MCNC: 19.4 MG/DL (ref 6–20)
CALCIUM SERPL-MCNC: 8.7 MG/DL (ref 8.6–10)
CHLORIDE SERPL-SCNC: 97 MMOL/L (ref 98–107)
CREAT SERPL-MCNC: 2.1 MG/DL (ref 0.67–1.17)
DEPRECATED HCO3 PLAS-SCNC: 21 MMOL/L (ref 22–29)
EGFRCR SERPLBLD CKD-EPI 2021: 36 ML/MIN/1.73M2
GLUCOSE SERPL-MCNC: 134 MG/DL (ref 70–99)
POTASSIUM SERPL-SCNC: 3.9 MMOL/L (ref 3.4–5.3)
SODIUM SERPL-SCNC: 132 MMOL/L (ref 135–145)

## 2024-06-27 PROCEDURE — H2032 ACTIVITY THERAPY, PER 15 MIN: HCPCS

## 2024-06-27 PROCEDURE — 250N000013 HC RX MED GY IP 250 OP 250 PS 637

## 2024-06-27 PROCEDURE — 36415 COLL VENOUS BLD VENIPUNCTURE: CPT

## 2024-06-27 PROCEDURE — 250N000013 HC RX MED GY IP 250 OP 250 PS 637: Performed by: NURSE PRACTITIONER

## 2024-06-27 PROCEDURE — 128N000004 HC R&B CD ADULT

## 2024-06-27 PROCEDURE — 80048 BASIC METABOLIC PNL TOTAL CA: CPT

## 2024-06-27 PROCEDURE — 99232 SBSQ HOSP IP/OBS MODERATE 35: CPT | Performed by: NURSE PRACTITIONER

## 2024-06-27 RX ADMIN — FOLIC ACID 1 MG: 1 TABLET ORAL at 08:17

## 2024-06-27 RX ADMIN — THIAMINE HCL TAB 100 MG 100 MG: 100 TAB at 08:17

## 2024-06-27 RX ADMIN — HYDROXYZINE HYDROCHLORIDE 25 MG: 25 TABLET ORAL at 16:49

## 2024-06-27 RX ADMIN — GABAPENTIN 300 MG: 300 CAPSULE ORAL at 20:47

## 2024-06-27 RX ADMIN — TRAZODONE HYDROCHLORIDE 100 MG: 100 TABLET ORAL at 22:02

## 2024-06-27 RX ADMIN — ESCITALOPRAM OXALATE 10 MG: 10 TABLET ORAL at 08:17

## 2024-06-27 RX ADMIN — Medication 1 TABLET: at 08:17

## 2024-06-27 RX ADMIN — ACETAMINOPHEN 650 MG: 325 TABLET, FILM COATED ORAL at 08:17

## 2024-06-27 RX ADMIN — HYDROXYZINE HYDROCHLORIDE 25 MG: 25 TABLET ORAL at 08:16

## 2024-06-27 RX ADMIN — ATORVASTATIN CALCIUM 20 MG: 20 TABLET, FILM COATED ORAL at 20:47

## 2024-06-27 RX ADMIN — ATENOLOL 50 MG: 50 TABLET ORAL at 04:08

## 2024-06-27 RX ADMIN — ACETAMINOPHEN 650 MG: 325 TABLET, FILM COATED ORAL at 14:20

## 2024-06-27 RX ADMIN — GABAPENTIN 300 MG: 300 CAPSULE ORAL at 08:17

## 2024-06-27 RX ADMIN — GABAPENTIN 300 MG: 300 CAPSULE ORAL at 14:19

## 2024-06-27 ASSESSMENT — ACTIVITIES OF DAILY LIVING (ADL)
ADLS_ACUITY_SCORE: 42
DRESS: SCRUBS (BEHAVIORAL HEALTH);INDEPENDENT
ADLS_ACUITY_SCORE: 42
ADLS_ACUITY_SCORE: 42
ORAL_HYGIENE: INDEPENDENT
ADLS_ACUITY_SCORE: 42
ADLS_ACUITY_SCORE: 42
HYGIENE/GROOMING: INDEPENDENT
ADLS_ACUITY_SCORE: 42
ORAL_HYGIENE: INDEPENDENT
ADLS_ACUITY_SCORE: 42
ADLS_ACUITY_SCORE: 42
DRESS: INDEPENDENT
ADLS_ACUITY_SCORE: 42
ADLS_ACUITY_SCORE: 42
LAUNDRY: WITH SUPERVISION
ADLS_ACUITY_SCORE: 42
HYGIENE/GROOMING: INDEPENDENT
ADLS_ACUITY_SCORE: 42

## 2024-06-27 NOTE — PLAN OF CARE
Problem: Alcohol Withdrawal  Goal: Alcohol Withdrawal Symptom Control  6/26/2024 1900 by Salma Gonzales, RN  Outcome: Progressing  6/26/2024 1834 by Salma Gonzales, RN  Outcome: Progressing        MSSA's 10 & 8 this shift, received Valium 10mg at each assessment.  Pt visible on unit, attended groups.  Pt reported bilateral foot pain rated 5/10, received PRN tylenol at 1617--relief reported.  Pt rated his anixety 8/10, depression 9/10.  Denied A/VH's.  Denied SI/SIB/HI.  Medication compliant.  Pt polite, cooperative & behaviorally controlled.  Pt has BMP lab ordered for AM.  Pt continues on fall, seizure & w/d precautions.  Status 15.  Will continue to monitor and support plan of care.      @1604: /91, Pulse 102, T 97.6    @2017: BP (!) 144/91 (BP Location: Right arm)   Pulse 102   Temp 97.4  F (36.3  C) (Temporal)   Resp 18   Ht 1.829 m (6')   Wt 81.3 kg (179 lb 4.8 oz)   SpO2 100%   BMI 24.32 kg/m

## 2024-06-27 NOTE — PROGRESS NOTES
[facility] is covered under patients commercial insurance plan. Patient owes $1,561.32 out of pocket to attend residential programming (anywhere, per his insurance). Patient spoke to [place] intake staff and developed a payment plan to attend residential at [facility]. Patient appeared optimistic about the plan.     Writer spoke to intake at [place] and they are still reviewing patient's referral and obtaining prior authorization from his insurance. Once approved, they will call the unit to speak with patient or case management about transportation (they provide transportation).     Patient reported he planned to call his wife to let her know his plan and ask her to deliver some of his belongings to [facility] (or 3[facility] over the weekend if patient stays until Monday).     [facility] does not admit on weekends    Plan:   Residential CD Treatment - pending approval - [place] can transport  [address] [address]   [phone]    [facility] Intake (for transportation): [phone]

## 2024-06-27 NOTE — PROGRESS NOTES
Writer spoke with Mary Alice at [facility] [place] and faxed her the following information per her request:   MAR  H&P  24 hours progress notes    Mary Alice's fax: [phone]  Phone: [phone]

## 2024-06-27 NOTE — PROGRESS NOTES
Brief Medicine Note    Reviewed BMP, Scr stable at 2.10 which is patients reported baseline. Bicarb and AG improved. NA did decreased to 132 though appears baseline Na ~131. Tolerating PO per report. Should have labs rechecked with PCP, I added this to discharge instructions. The medicine consult team will sign off, thank you for allowing us to be involved in this patients care. Please do not hesitate to reach out should any further questions or concerns arise.     Shannon Selby PA-C

## 2024-06-27 NOTE — PLAN OF CARE
Problem: Alcohol Withdrawal  Goal: Alcohol Withdrawal Symptom Control  Outcome: Progressing     Problem: Sleep Disturbance  Goal: Adequate Sleep/Rest  Outcome: Progressing   Goal Outcome Evaluation:      The Patient's MSSA scores were 6 and 6. He received no medication for withdrawal symptoms but he got Atenolol for HR of 116. He slept okay, and the Team conducted safety checks every 15 minutes and noted no safety issues.

## 2024-06-27 NOTE — PLAN OF CARE
Goal Outcome Evaluation:    Plan of Care Reviewed With: patient           Problem: Alcohol Withdrawal  Goal: Alcohol Withdrawal Symptom Control  Outcome: Progressing  Intervention: Minimize or Manage Alcohol Withdrawal Symptoms  Recent Flowsheet Documentation  Taken 6/27/2024 0820 by Natali Sousa RN  Seizure Precautions: clutter-free environment maintained       Patient admitted for alcohol withdrawal, MSSA 4 & 2 not requiring valium this shift.  He was visible on the unit, social with peers and attended group. Reported anxiety 7/10 and was given vistaril with some benefit, he endorses depression 5/10 without thoughts plan or intent for self harm.  Pt reports he spoke with the [employer] program and they are going to work on a payment plan so he can go to treatment. He was encouraged to work with CTC and provider on discharge plan.  Pt denies SI/SIB/HI/AVH.      Medical Concerns: CKD, Gout    Appetite: Pt ate 100% of both meals     Sleep: slept well.     PRN's given: Hydroxyzine for anxiety, Tylenol for (R) great toe pain from gout

## 2024-06-27 NOTE — PROGRESS NOTES
[facility], [place]   Psychiatric Progress Note        Interim History:   Team meeting report: The patient's care was discussed with the treatment team during the daily team meeting and/or staff's chart notes were reviewed.  Staff report patient has been calm, pleasant, cooperative.  He has been scoring high on the MSSA and taking Valium with the last use yesterday at 8:30 PM.  The patient is visible in the milieu and attending groups.  No behavioral issues.  Reported anxiety 8/10, depression 9/10, 10 being the worst.  Reported significant neuropathy pain.  Med compliant.  Slept through the night.    Met with patient.  Feels better in terms of withdrawal symptoms.  He reports sleeping a little bit better last night.  Continues to feel depressed but not nearly as much as yesterday.  Anxiety continues to be a problem.  He took hydroxyzine this morning.  Denies suicidal and homicidal ideation.  Denies hallucinations of any type, paranoia, delusions.  The patient is eating well.  Discussed disposition.  The patient is hoping to go [id number] to chemical dependency treatment.  He understands that if they have a long waiting list, he may need to go home first before he go to rehab.  Again he is hoping to go [id number] since the likelihood for relapsing is very high.         Medications:     Current Facility-Administered Medications   Medication Dose Route Frequency Provider Last Rate Last Admin    atorvastatin (LIPITOR) tablet 20 mg  20 mg Oral Daily Shannon Selby PA-C   20 mg at 06/26/24 2034    escitalopram (LEXAPRO) tablet 10 mg  10 mg Oral Daily Jonny Cloud APRN CNP   10 mg at 06/27/24 0817    folic acid (FOLVITE) tablet 1 mg  1 mg Oral Daily Kera Chavis APRN CNP   1 mg at 06/27/24 0817    gabapentin (NEURONTIN) capsule 300 mg  300 mg Oral TID Jonny Cloud APRN CNP   300 mg at 06/27/24 0817    multivitamin w/minerals (THERA-VIT-M) tablet 1  tablet  1 tablet Oral Daily Kera Chavis APRN CNP   1 tablet at 06/27/24 0817    thiamine (B-1) tablet 100 mg  100 mg Oral Daily Kera Chavis APRN CNP   100 mg at 06/27/24 0817    traZODone (DESYREL) tablet 100 mg  100 mg Oral At Bedtime Jonny Cloud APRN CNP   100 mg at 06/26/24 6555            Allergies:   No Known Allergies         Labs:     Recent Results (from the past 672 hour(s))   Alcohol breath test POCT    Collection Time: 06/25/24 10:00 AM   Result Value Ref Range    Alcohol Breath Test 0.267 (A) 0.00 - 0.01   Comprehensive metabolic panel    Collection Time: 06/25/24 10:16 AM   Result Value Ref Range    Sodium 140 135 - 145 mmol/L    Potassium 3.7 3.4 - 5.3 mmol/L    Carbon Dioxide (CO2) 18 (L) 22 - 29 mmol/L    Anion Gap 22 (H) 7 - 15 mmol/L    Urea Nitrogen 22.7 (H) 6.0 - 20.0 mg/dL    Creatinine 2.31 (H) 0.67 - 1.17 mg/dL    GFR Estimate 32 (L) >60 mL/min/1.73m2    Calcium 8.7 8.6 - 10.0 mg/dL    Chloride 100 98 - 107 mmol/L    Glucose 99 70 - 99 mg/dL    Alkaline Phosphatase 116 40 - 150 U/L     (H) 0 - 45 U/L    ALT 79 (H) 0 - 70 U/L    Protein Total 8.5 (H) 6.4 - 8.3 g/dL    Albumin 4.5 3.5 - 5.2 g/dL    Bilirubin Total 0.4 <=1.2 mg/dL   Magnesium    Collection Time: 06/25/24 10:16 AM   Result Value Ref Range    Magnesium 2.0 1.7 - 2.3 mg/dL   Ethyl Alcohol Level    Collection Time: 06/25/24 10:16 AM   Result Value Ref Range    Alcohol ethyl 0.30 (H) <=0.01 g/dL   CBC with platelets and differential    Collection Time: 06/25/24 10:16 AM   Result Value Ref Range    WBC Count 4.0 4.0 - 11.0 10e3/uL    RBC Count 3.97 (L) 4.40 - 5.90 10e6/uL    Hemoglobin 11.0 (L) 13.3 - 17.7 g/dL    Hematocrit 33.3 (L) 40.0 - 53.0 %    MCV 84 78 - 100 fL    MCH 27.7 26.5 - 33.0 pg    MCHC 33.0 31.5 - 36.5 g/dL    RDW 17.4 (H) 10.0 - 15.0 %    Platelet Count 118 (L) 150 - 450 10e3/uL    % Neutrophils 50 %    % Lymphocytes 36 %    % Monocytes 10 %    % Eosinophils 2 %    % Basophils 1 %     % Immature Granulocytes 1 %    NRBCs per 100 WBC 0 <1 /100    Absolute Neutrophils 2.1 1.6 - 8.3 10e3/uL    Absolute Lymphocytes 1.4 0.8 - 5.3 10e3/uL    Absolute Monocytes 0.4 0.0 - 1.3 10e3/uL    Absolute Eosinophils 0.1 0.0 - 0.7 10e3/uL    Absolute Basophils 0.0 0.0 - 0.2 10e3/uL    Absolute Immature Granulocytes 0.0 <=0.4 10e3/uL    Absolute NRBCs 0.0 10e3/uL   Urine Drug Screen Panel    Collection Time: 06/25/24 11:13 AM   Result Value Ref Range    Amphetamines Urine Screen Negative Screen Negative    Barbituates Urine Screen Negative Screen Negative    Benzodiazepine Urine Screen Negative Screen Negative    Cannabinoids Urine Screen Positive (A) Screen Negative    Cocaine Urine Screen Negative Screen Negative    Fentanyl Qual Urine Screen Negative Screen Negative    Opiates Urine Screen Negative Screen Negative    PCP Urine Screen Negative Screen Negative   Creatinine POCT    Collection Time: 06/25/24  2:11 PM   Result Value Ref Range    Creatinine POCT 2.4 (H) 0.7 - 1.3 mg/dL    GFR, ESTIMATED POCT 31 (L) >60 mL/min/1.73m2   Basic metabolic panel    Collection Time: 06/25/24  4:42 PM   Result Value Ref Range    Sodium 136 135 - 145 mmol/L    Potassium 3.6 3.4 - 5.3 mmol/L    Chloride 99 98 - 107 mmol/L    Carbon Dioxide (CO2) 20 (L) 22 - 29 mmol/L    Anion Gap 17 (H) 7 - 15 mmol/L    Urea Nitrogen 22.2 (H) 6.0 - 20.0 mg/dL    Creatinine 2.09 (H) 0.67 - 1.17 mg/dL    GFR Estimate 36 (L) >60 mL/min/1.73m2    Calcium 8.1 (L) 8.6 - 10.0 mg/dL    Glucose 179 (H) 70 - 99 mg/dL   Asymptomatic Influenza A/B, RSV, & SARS-CoV2 PCR (COVID-19) Nose    Collection Time: 06/25/24  5:42 PM    Specimen: Nose; Swab   Result Value Ref Range    Influenza A PCR Negative Negative    Influenza B PCR Negative Negative    RSV PCR Negative Negative    SARS CoV2 PCR Negative Negative   Glucose by meter    Collection Time: 06/25/24 10:17 PM   Result Value Ref Range    GLUCOSE BY METER POCT 149 (H) 70 - 99 mg/dL   Basic metabolic  panel    Collection Time: 06/27/24  6:38 AM   Result Value Ref Range    Sodium 132 (L) 135 - 145 mmol/L    Potassium 3.9 3.4 - 5.3 mmol/L    Chloride 97 (L) 98 - 107 mmol/L    Carbon Dioxide (CO2) 21 (L) 22 - 29 mmol/L    Anion Gap 14 7 - 15 mmol/L    Urea Nitrogen 19.4 6.0 - 20.0 mg/dL    Creatinine 2.10 (H) 0.67 - 1.17 mg/dL    GFR Estimate 36 (L) >60 mL/min/1.73m2    Calcium 8.7 8.6 - 10.0 mg/dL    Glucose 134 (H) 70 - 99 mg/dL            Precautions:     Behavioral Orders   Procedures    Code 1 - Restrict to Unit    Fall precautions    Routine Programming     As clinically indicated    Seizure precautions    Seizure precautions    Status 15     Every 15 minutes.    Withdrawal precautions            Psychiatric Examination:   Temp: 97.8  F (36.6  C) Temp src: Temporal BP: 116/78 Pulse: 85   Resp: 18 SpO2: 98 % O2 Device: None (Room air)    Weight is 179 lbs 4.8 oz  Body mass index is 24.32 kg/m .    Appearance: awake, alert and adequately groomed  Attitude:  cooperative  Eye Contact:  good  Mood:  anxious, depressed, and better  Affect:  appropriate and in normal range  Speech:  clear, coherent  Psychomotor Behavior:  no evidence of tardive dyskinesia, dystonia, or tics  Throught Process:  logical and goal oriented  Associations:  no loose associations  Thought Content:  no evidence of suicidal ideation or homicidal ideation  Insight:  fair  Judgement:  fair  Oriented to:  time, person, and place  Attention Span and Concentration:  fair  Recent and Remote Memory:  fair         Precautions:     Behavioral Orders   Procedures    Code 1 - Restrict to Unit    Fall precautions    Routine Programming     As clinically indicated    Seizure precautions    Seizure precautions    Status 15     Every 15 minutes.    Withdrawal precautions          DIagnoses:   Alcohol use disorder, severe, dependence  Alcohol withdrawals with unspecified complications  Cannabis use disorder, moderate  Major depressive disorder,  moderate  Generalized anxiety disorder         Plan:   MMedications:  --MSSA with Valium, multivitamins, thiamine, and folic acid for alcohol withdrawals  --Restart the Lexapro, 10 mg every morning for depression and anxiety  --Restart gabapentin, 300 mg 3 times a day for anxiety, neuropathy, and alcohol cravings  --Restart trazodone, 100 mg at bedtime  --Additional medications will include atenolol, hydroxyzine, loperamide, Zyprexa, Zofran  Lab work:  Lab work was reviewed.  Abnormal results include carbon dioxide 28, urea nitrogen 22.2, creatinine 2.09, creatinine pulsed 2.4, GFR 36, calcium 8.1, anion gap 17, ALT 79, , glucose 179, hemoglobin 11.0, hematocrit 33.3, platelets 118, RBC 3.97, RDW 17.4  U tox was positive for cannabis  But alcohol level 0.20    Consults:   Internal medicine to follow up for medical problems.   PINEDA (improving) on CKD III  AGMA (improving)  Scr on admission 2.31--> 2.09. Appears baseline ~1.7-1.8 in our system though patient reports he gets the majority of his care elsewhere and his baseline is typically closer to 2.1. No urinary symptoms, pt reports normal volumes of urination. Bicarb 18 on admission, improved to 20 with IVF. AG 22 on admission, improved to 17 with IVF. No localizing infectious symptoms. Suspect PINEDA prerenal 2/2 dehydration iso above, reassuring that it is improving with IVF.   - Encourage PO  - Repeat BMP ordered for 6/27      Transaminitis  , ALT 79. Remainder of LFTs wnl. Pt without abdominal pain. Suspect 2/2 etoh given history and pattern of AST > 2x ALT.   - PCP follow-up to recheck      Thrombocytopenia  Plts 118 on arrival, suspect likely 2/2 BM suppression iso above. No e/o bleeding on exam.  - PCP follow-up to recheck   Normocytic Anemia  Hgb 11.0 on admission, baseline appears to be closer to ~7s-8s. Likely that patient is hemo concentrated which is contributing to the increase from baseline. No e/o bleeding on exam, patient denies noticing  any bleeding.  - Follow-up with PCP     Prediabetes  HLD  A1c 5.7%, 3/2023.   - Cont pta atorvastatin  - Follow-up with PCP     Gout   No e/o acute exacerbation. No longer taking allopurinol.    # Hypocalcemia: Lowest Ca = 8.1 mg/dL in last 2 days, will monitor and replace as appropriate    # Anion Gap Metabolic Acidosis: Highest Anion Gap = 22 mmol/L in last 2 days, will monitor and treat as appropriate    # Thrombocytopenia: Lowest platelets = 118 in last 2 days, will monitor for bleeding      Shannon Selby PA-C  Hospitalist Service     Disposition Plan   Reason for ongoing admission: requires detoxification from substance that poses a risk of bodily harm during withdrawal period  Discharge location: Chemical dependency treatment facility  Discharge Medications: not ordered  Follow-up Appointments: not scheduled  Legal Status: voluntary   Discharge will be granted once symptoms improved.    Jonny RUANO, CNP    This note was created with the help of Dragon dictation system. All grammatical/typing errors or context distortion are unintentional and inherent to software.

## 2024-06-28 VITALS
WEIGHT: 179.3 LBS | RESPIRATION RATE: 16 BRPM | DIASTOLIC BLOOD PRESSURE: 77 MMHG | HEIGHT: 72 IN | TEMPERATURE: 97.3 F | BODY MASS INDEX: 24.29 KG/M2 | OXYGEN SATURATION: 99 % | HEART RATE: 85 BPM | SYSTOLIC BLOOD PRESSURE: 131 MMHG

## 2024-06-28 PROCEDURE — 250N000013 HC RX MED GY IP 250 OP 250 PS 637

## 2024-06-28 PROCEDURE — 99238 HOSP IP/OBS DSCHRG MGMT 30/<: CPT | Performed by: NURSE PRACTITIONER

## 2024-06-28 PROCEDURE — 250N000013 HC RX MED GY IP 250 OP 250 PS 637: Performed by: NURSE PRACTITIONER

## 2024-06-28 RX ORDER — ESCITALOPRAM OXALATE 10 MG/1
10 TABLET ORAL DAILY
Qty: 30 TABLET | Refills: 1 | Status: SHIPPED | OUTPATIENT
Start: 2024-06-28

## 2024-06-28 RX ORDER — TRAZODONE HYDROCHLORIDE 100 MG/1
100 TABLET ORAL AT BEDTIME
Qty: 30 TABLET | Refills: 0 | Status: SHIPPED | OUTPATIENT
Start: 2024-06-28

## 2024-06-28 RX ORDER — ACAMPROSATE CALCIUM 333 MG/1
666 TABLET, DELAYED RELEASE ORAL 3 TIMES DAILY
Qty: 180 TABLET | Refills: 0 | Status: SHIPPED | OUTPATIENT
Start: 2024-06-28 | End: 2024-06-28

## 2024-06-28 RX ORDER — ATORVASTATIN CALCIUM 20 MG/1
40 TABLET, FILM COATED ORAL DAILY
Qty: 30 TABLET | Refills: 0 | Status: SHIPPED | OUTPATIENT
Start: 2024-06-28

## 2024-06-28 RX ORDER — ACAMPROSATE CALCIUM 333 MG/1
333 TABLET, DELAYED RELEASE ORAL 3 TIMES DAILY
Status: DISCONTINUED | OUTPATIENT
Start: 2024-06-28 | End: 2024-06-28 | Stop reason: HOSPADM

## 2024-06-28 RX ORDER — ACAMPROSATE CALCIUM 333 MG/1
666 TABLET, DELAYED RELEASE ORAL 3 TIMES DAILY
Status: DISCONTINUED | OUTPATIENT
Start: 2024-06-28 | End: 2024-06-28

## 2024-06-28 RX ORDER — GABAPENTIN 300 MG/1
300 CAPSULE ORAL 3 TIMES DAILY
Qty: 90 CAPSULE | Refills: 0 | Status: SHIPPED | OUTPATIENT
Start: 2024-06-28

## 2024-06-28 RX ORDER — HYDROXYZINE HYDROCHLORIDE 25 MG/1
25 TABLET, FILM COATED ORAL 2 TIMES DAILY PRN
Qty: 60 TABLET | Refills: 0 | Status: SHIPPED | OUTPATIENT
Start: 2024-06-28

## 2024-06-28 RX ORDER — ACAMPROSATE CALCIUM 333 MG/1
333 TABLET, DELAYED RELEASE ORAL 3 TIMES DAILY
Qty: 90 TABLET | Refills: 0 | Status: SHIPPED | OUTPATIENT
Start: 2024-06-28

## 2024-06-28 RX ADMIN — FOLIC ACID 1 MG: 1 TABLET ORAL at 08:22

## 2024-06-28 RX ADMIN — GABAPENTIN 300 MG: 300 CAPSULE ORAL at 08:22

## 2024-06-28 RX ADMIN — THIAMINE HCL TAB 100 MG 100 MG: 100 TAB at 08:22

## 2024-06-28 RX ADMIN — Medication 1 TABLET: at 08:22

## 2024-06-28 RX ADMIN — ACETAMINOPHEN 650 MG: 325 TABLET, FILM COATED ORAL at 08:24

## 2024-06-28 RX ADMIN — ESCITALOPRAM OXALATE 10 MG: 10 TABLET ORAL at 08:22

## 2024-06-28 ASSESSMENT — ACTIVITIES OF DAILY LIVING (ADL)
ADLS_ACUITY_SCORE: 42
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
## 2024-06-28 NOTE — PROGRESS NOTES
Patient discharged to [facility]. He was picked up and transported by treatment staff.  Patient escorted off the unit by psych associate, Satinder        All patient belongings from room, locked bin and security were sent with patient. This RN went over discharge instructions, teachings, patient's labs, and discharge  recommendations. This RN went over patient's prescribed medications being sent home with patient from pharmacy. Patient verbalizes and demonstrates understanding of all teachings. Patient denies thoughts of harm towards self or others.  Pt denies any current medical issues or questions and is now discharged.

## 2024-06-28 NOTE — PLAN OF CARE
Problem: Alcohol Withdrawal  Goal: Alcohol Withdrawal Symptom Control  Outcome: Progressing  Intervention: Minimize or Manage Alcohol Withdrawal Symptoms  Recent Flowsheet Documentation  Taken 6/27/2024 2100 by Carroll Pierre RN  Seizure Precautions: clutter-free environment maintained   Goal Outcome Evaluation:    Plan of Care Reviewed With: patient      Pt was visible in the milieu and spent time socializing with peers and watching TV. Pt reported feeling better and not having withdrawal symptoms. MSSA scores were 3 and 1. Pt rated anxiety at 7/10, hydroxyzine PRN was given and pt reported having relief. Pt denies SI, AVH. Pt's last valium was 6/26 at 2030. Pt is out of detox at this time. No new medical concerns this shift.

## 2024-06-28 NOTE — PROGRESS NOTES
Case Management:   Writer spoke with  for [facility] who arrived this morning without unit being alerted. Patient was very understanding of this, and  is able to stay until meds arrive.     Patient will be discharged at 10:45 AM Today    Living Situation:  Owns home and lives with wife.     Insurance:  [employer]    Legal Status  Voluntary    Substance Use Assessment Status:  Completed    Referral Status:  [facility] has accepted him to Residential program.     Established Services or Current Support  Team Member Role and Specialty Contact Info Address Start End Comments   Jeramie Altamirano APRN, CNP General (Internal Medicine) Phone: [phone]  Fax: [phone] - 3/27/2023 - -       Next Steps and Discharge Plan or Goal:  Patient will be discharged to [facility] program today, plans to return home afterwards.     Name/Credentials Dorothea Douglas, ROSANNAC, ELIGIO

## 2024-06-28 NOTE — PROGRESS NOTES
"CLINICAL NUTRITION SERVICES - ASSESSMENT NOTE     Nutrition Prescription    RECOMMENDATIONS FOR MDs/PROVIDERS TO ORDER:  ***    Malnutrition Status:    ***    Recommendations already ordered by Registered Dietitian (RD):  ***    Future/Additional Recommendations:  ***     REASON FOR ASSESSMENT  Monty Cox is a/an 58 year old male assessed by the dietitian for Admission Nutrition Risk Screen for positive decreased appetite and weight loss.     CLINICAL HISTORY  ***    NUTRITION HISTORY  ***    GI: ***    CURRENT NUTRITION ORDERS  Diet: Regular  Supplement: none  Intake/Tolerance: ***    LABS  Na 132L  Cr 2.10H  GFR 36L    MEDICATIONS  Lexapro, folic acid, MVI-M, thiamine, atarax prn, zofran prng    ANTHROPOMETRICS  Height: 182.9 cm (6' 0\")  Most Recent Weight: 81.3 kg (179 lb 4.8 oz)    IBW: 80.9 kg   BMI: Normal BMI  Weight History:   Wt Readings from Last 15 Encounters:   06/25/24 81.3 kg (179 lb 4.8 oz)   08/25/23 77.3 kg (170 lb 6.4 oz)   08/08/23 75.8 kg (167 lb)   08/08/23 74.8 kg (165 lb)   03/16/23 73.5 kg (162 lb)   07/16/22 83 kg (183 lb)   03/22/21 79.4 kg (175 lb)   ***    Dosing Weight: *** kg (***)    ASSESSED NUTRITION NEEDS  Estimated Energy Needs: *** kcals/day ({:435577})  Justification: {:717497}  Estimated Protein Needs: *** grams protein/day ({:116697})  Justification: {:710599}  Estimated Fluid Needs: *** mL/day ({:224537})   Justification: {:865193}    PHYSICAL FINDINGS  See malnutrition section below.  Rufino: 20  No abnormal nutrition-related physical findings observed.     MALNUTRITION  % Intake: {:408790}  % Weight Loss: {:846375}  Subcutaneous Fat Loss: Unable to assess - RD offsite  Muscle Loss: Unable to assess - RD offsite  Fluid Accumulation/Edema: Unable to assess - RD offsite  Malnutrition Diagnosis: {:942030}    NUTRITION DIAGNOSIS  {:831404} related to *** as evidenced by ***      INTERVENTIONS  Implementation  { :747204}   { :929268}     Goals  { :249254}   "   Monitoring/Evaluation  Progress toward goals will be monitored and evaluated per protocol.    Jelly Duron, MPH, RDN, LD  [facility]al [facility] Adult [facility] Pediatric Dietitian  [facility] Clinical Dietitian [place], Teams, or Desk: [phone]  Weekend/Holiday [place]: Weekend Holiday Clinical Dietitian [Multi Site Groups]

## 2024-08-11 ENCOUNTER — HEALTH MAINTENANCE LETTER (OUTPATIENT)
Age: 59
End: 2024-08-11

## 2025-02-26 NOTE — PLAN OF CARE
[facility] CARDIOLOGY, [address], [address]  [place] [address]  PHONE: [phone]  Yair Gee  1953    Chief Complant:    Chief Complaint   Patient presents with    Consultation    Atrial Fibrillation      Consultation is requested by [unfilled] for evaluation of Consultation and Atrial Fibrillation    Reason for Consultation: afib    History:  Yair Gee is a very pleasant 71 y.o. male with a past medical and cardiac history significant for HTN, DM, persistent atrial fibrillation and presents for EP consultation for afib. He has noted more fatigue and lower energy over the past few months, some intermittent palpitations, no chest pain, syncope. No prior arrhythmia, no prior MI.    Cardiac PMH: (Old records have been reviewed and summarized below)  TTE (2/10/25): EF 45-50%  EKG (1/28/25) personally viewed and interpreted: afib, NSST  EKG (1/28/25) personally viewed and interpreted: NSR    Reviewed office note Dr. Rosales 1/28/25    Past Medical History, Past Surgical History, Family history, Social History, and Medications were all reviewed with the patient today and updated as necessary.     Current Outpatient Medications   Medication Sig Dispense Refill    carvedilol (COREG) 6.25 MG tablet Take 1 tablet by mouth 2 times daily (with meals) 180 tablet 1    hydrALAZINE (APRESOLINE) 50 MG tablet Take 1 tablet by mouth 3 times daily 270 tablet 1    lisinopril (PRINIVIL;ZESTRIL) 40 MG tablet Take 1 tablet by mouth daily 90 tablet 1    metFORMIN (GLUCOPHAGE-XR) 750 MG extended release tablet TAKE 1 TABLET BY MOUTH EVERY DAY WITH EVENING MEAL 90 tablet 1    hydroCHLOROthiazide (HYDRODIURIL) 25 MG tablet Take 1 tablet by mouth every morning 90 tablet 1    apixaban (ELIQUIS) 5 MG TABS tablet Take 1 tablet by mouth 2 times daily 60 tablet 0    silver sulfADIAZINE (SILVADENE) 1 % cream Apply topically 3-5 times daily to areas of peeling 50 g 0    Multiple Vitamins-Minerals  Goal Outcome Evaluation:    Plan of Care Reviewed With: patient                 Problem: Alcohol Withdrawal  Goal: Alcohol Withdrawal Symptom Control  Outcome: Progressing  Intervention: Minimize or Manage Alcohol Withdrawal Symptoms  Recent Flowsheet Documentation  Taken 6/26/2024 0847 by Natali Sousa RN  Seizure Precautions: clutter-free environment maintained     Patient admitted for alcohol withdrawal, MSSA 12 & 10 requiring valium X 2 this shift.  He was visible on the unit, limited interaction with peers, but pleasant on approach. Reported anxiety 9/10 and depression 8/10 without thoughts plan or intent for self harm. He was encouraged to work with CTC and provider on discharge plan.  Pt denies SI/SIB/HI/AVH.      Medical Concerns: denies. Chart review Renal disease, bilateral foot pain from gout  Appetite: Pt reported am nausea and poor appetite, somewhat better at lunch      Sleep: slept well.     PRN's given: Zofran, Valium, Tylenol

## 2025-07-28 NOTE — PLAN OF CARE
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   No new medical concerns noted.             The patient is a 14y Male complaining of allergic reaction.

## 2025-08-17 ENCOUNTER — HEALTH MAINTENANCE LETTER (OUTPATIENT)
Age: 60
End: 2025-08-17